# Patient Record
Sex: MALE | Race: WHITE | NOT HISPANIC OR LATINO | Employment: OTHER | URBAN - METROPOLITAN AREA
[De-identification: names, ages, dates, MRNs, and addresses within clinical notes are randomized per-mention and may not be internally consistent; named-entity substitution may affect disease eponyms.]

---

## 2017-01-19 ENCOUNTER — ALLSCRIPTS OFFICE VISIT (OUTPATIENT)
Dept: WOUND CARE | Facility: HOSPITAL | Age: 82
End: 2017-01-19
Payer: COMMERCIAL

## 2017-01-19 PROCEDURE — 11045 DBRDMT SUBQ TISS EACH ADDL: CPT | Performed by: PODIATRIST

## 2017-01-19 PROCEDURE — 99213 OFFICE O/P EST LOW 20 MIN: CPT | Performed by: PODIATRIST

## 2017-01-19 PROCEDURE — 11042 DBRDMT SUBQ TIS 1ST 20SQCM/<: CPT | Performed by: PODIATRIST

## 2017-01-30 ENCOUNTER — HOSPITAL ENCOUNTER (OUTPATIENT)
Dept: NON INVASIVE DIAGNOSTICS | Facility: CLINIC | Age: 82
Discharge: HOME | End: 2017-01-30
Payer: COMMERCIAL

## 2017-01-30 DIAGNOSIS — I73.9 PERIPHERAL VASCULAR DISEASE (HCC): ICD-10-CM

## 2017-01-30 PROCEDURE — 93925 LOWER EXTREMITY STUDY: CPT

## 2017-01-30 PROCEDURE — 93978 VASCULAR STUDY: CPT

## 2017-01-30 PROCEDURE — 93923 UPR/LXTR ART STDY 3+ LVLS: CPT

## 2017-01-31 ENCOUNTER — ALLSCRIPTS OFFICE VISIT (OUTPATIENT)
Dept: WOUND CARE | Facility: HOSPITAL | Age: 82
End: 2017-01-31
Payer: COMMERCIAL

## 2017-01-31 PROCEDURE — 99212 OFFICE O/P EST SF 10 MIN: CPT | Performed by: PODIATRIST

## 2017-05-08 ENCOUNTER — ALLSCRIPTS OFFICE VISIT (OUTPATIENT)
Dept: OTHER | Facility: OTHER | Age: 82
End: 2017-05-08

## 2017-05-29 ENCOUNTER — HOSPITAL ENCOUNTER (INPATIENT)
Facility: HOSPITAL | Age: 82
LOS: 4 days | Discharge: HOME HEALTH | DRG: 191 | End: 2017-06-02
Attending: EMERGENCY MEDICINE | Admitting: INTERNAL MEDICINE
Payer: COMMERCIAL

## 2017-05-29 ENCOUNTER — APPOINTMENT (EMERGENCY)
Dept: RADIOLOGY | Facility: HOSPITAL | Age: 82
DRG: 191 | End: 2017-05-29
Payer: COMMERCIAL

## 2017-05-29 ENCOUNTER — GENERIC CONVERSION - ENCOUNTER (OUTPATIENT)
Dept: OTHER | Facility: OTHER | Age: 82
End: 2017-05-29

## 2017-05-29 DIAGNOSIS — E11.9 DM2 (DIABETES MELLITUS, TYPE 2) (HCC): Chronic | ICD-10-CM

## 2017-05-29 DIAGNOSIS — R06.02 SOB (SHORTNESS OF BREATH): ICD-10-CM

## 2017-05-29 DIAGNOSIS — R05.9 COUGH: Primary | ICD-10-CM

## 2017-05-29 DIAGNOSIS — J44.9 COPD (CHRONIC OBSTRUCTIVE PULMONARY DISEASE) (HCC): Chronic | ICD-10-CM

## 2017-05-29 DIAGNOSIS — I50.9 CHF EXACERBATION (HCC): ICD-10-CM

## 2017-05-29 DIAGNOSIS — Z86.718 HISTORY OF DVT (DEEP VEIN THROMBOSIS): ICD-10-CM

## 2017-05-29 DIAGNOSIS — I50.22 CHRONIC SYSTOLIC HEART FAILURE (HCC): Chronic | ICD-10-CM

## 2017-05-29 DIAGNOSIS — R60.0 LOWER EXTREMITY EDEMA: ICD-10-CM

## 2017-05-29 LAB
ALBUMIN SERPL BCP-MCNC: 4 G/DL (ref 3.5–5)
ALP SERPL-CCNC: 102 U/L (ref 46–116)
ALT SERPL W P-5'-P-CCNC: 14 U/L (ref 12–78)
ANION GAP SERPL CALCULATED.3IONS-SCNC: 8 MMOL/L (ref 4–13)
APTT PPP: 61 SECONDS (ref 23–35)
AST SERPL W P-5'-P-CCNC: 12 U/L (ref 5–45)
BASOPHILS # BLD AUTO: 0.01 THOUSANDS/ΜL (ref 0–0.1)
BASOPHILS NFR BLD AUTO: 0 % (ref 0–1)
BILIRUB SERPL-MCNC: 0.62 MG/DL (ref 0.2–1)
BUN SERPL-MCNC: 27 MG/DL (ref 5–25)
CALCIUM SERPL-MCNC: 9.5 MG/DL (ref 8.3–10.1)
CHLORIDE SERPL-SCNC: 99 MMOL/L (ref 100–108)
CO2 SERPL-SCNC: 27 MMOL/L (ref 21–32)
CREAT SERPL-MCNC: 2.12 MG/DL (ref 0.6–1.3)
EOSINOPHIL # BLD AUTO: 0.05 THOUSAND/ΜL (ref 0–0.61)
EOSINOPHIL NFR BLD AUTO: 1 % (ref 0–6)
ERYTHROCYTE [DISTWIDTH] IN BLOOD BY AUTOMATED COUNT: 14 % (ref 11.6–15.1)
EST. AVERAGE GLUCOSE BLD GHB EST-MCNC: 169 MG/DL
GFR SERPL CREATININE-BSD FRML MDRD: 29.6 ML/MIN/1.73SQ M
GLUCOSE SERPL-MCNC: 129 MG/DL (ref 65–140)
GLUCOSE SERPL-MCNC: 134 MG/DL (ref 65–140)
HBA1C MFR BLD: 7.5 % (ref 4.2–6.3)
HCT VFR BLD AUTO: 39.4 % (ref 36.5–49.3)
HGB BLD-MCNC: 13.6 G/DL (ref 12–17)
INR PPP: 3.02 (ref 0.86–1.16)
LYMPHOCYTES # BLD AUTO: 0.73 THOUSANDS/ΜL (ref 0.6–4.47)
LYMPHOCYTES NFR BLD AUTO: 9 % (ref 14–44)
MCH RBC QN AUTO: 31.2 PG (ref 26.8–34.3)
MCHC RBC AUTO-ENTMCNC: 34.5 G/DL (ref 31.4–37.4)
MCV RBC AUTO: 90 FL (ref 82–98)
MONOCYTES # BLD AUTO: 0.77 THOUSAND/ΜL (ref 0.17–1.22)
MONOCYTES NFR BLD AUTO: 9 % (ref 4–12)
NEUTROPHILS # BLD AUTO: 6.64 THOUSANDS/ΜL (ref 1.85–7.62)
NEUTS SEG NFR BLD AUTO: 81 % (ref 43–75)
NRBC BLD AUTO-RTO: 0 /100 WBCS
NT-PROBNP SERPL-MCNC: 3177 PG/ML
PLATELET # BLD AUTO: 144 THOUSANDS/UL (ref 149–390)
PMV BLD AUTO: 10.5 FL (ref 8.9–12.7)
POTASSIUM SERPL-SCNC: 4.3 MMOL/L (ref 3.5–5.3)
PROT SERPL-MCNC: 8.8 G/DL (ref 6.4–8.2)
PROTHROMBIN TIME: 31.7 SECONDS (ref 12.1–14.4)
RBC # BLD AUTO: 4.36 MILLION/UL (ref 3.88–5.62)
SODIUM SERPL-SCNC: 134 MMOL/L (ref 136–145)
SPECIMEN SOURCE: NORMAL
TROPONIN I BLD-MCNC: 0.01 NG/ML (ref 0–0.08)
TROPONIN I SERPL-MCNC: <0.02 NG/ML
WBC # BLD AUTO: 8.23 THOUSAND/UL (ref 4.31–10.16)

## 2017-05-29 PROCEDURE — 36415 COLL VENOUS BLD VENIPUNCTURE: CPT | Performed by: EMERGENCY MEDICINE

## 2017-05-29 PROCEDURE — 84484 ASSAY OF TROPONIN QUANT: CPT

## 2017-05-29 PROCEDURE — 93005 ELECTROCARDIOGRAM TRACING: CPT | Performed by: EMERGENCY MEDICINE

## 2017-05-29 PROCEDURE — 80053 COMPREHEN METABOLIC PANEL: CPT | Performed by: EMERGENCY MEDICINE

## 2017-05-29 PROCEDURE — 99285 EMERGENCY DEPT VISIT HI MDM: CPT

## 2017-05-29 PROCEDURE — 85730 THROMBOPLASTIN TIME PARTIAL: CPT | Performed by: EMERGENCY MEDICINE

## 2017-05-29 PROCEDURE — 84484 ASSAY OF TROPONIN QUANT: CPT | Performed by: PHYSICIAN ASSISTANT

## 2017-05-29 PROCEDURE — 83036 HEMOGLOBIN GLYCOSYLATED A1C: CPT | Performed by: PHYSICIAN ASSISTANT

## 2017-05-29 PROCEDURE — 85025 COMPLETE CBC W/AUTO DIFF WBC: CPT | Performed by: EMERGENCY MEDICINE

## 2017-05-29 PROCEDURE — 94760 N-INVAS EAR/PLS OXIMETRY 1: CPT

## 2017-05-29 PROCEDURE — 83880 ASSAY OF NATRIURETIC PEPTIDE: CPT | Performed by: EMERGENCY MEDICINE

## 2017-05-29 PROCEDURE — 85610 PROTHROMBIN TIME: CPT | Performed by: EMERGENCY MEDICINE

## 2017-05-29 PROCEDURE — 94640 AIRWAY INHALATION TREATMENT: CPT

## 2017-05-29 PROCEDURE — 82948 REAGENT STRIP/BLOOD GLUCOSE: CPT

## 2017-05-29 PROCEDURE — 87081 CULTURE SCREEN ONLY: CPT | Performed by: INTERNAL MEDICINE

## 2017-05-29 PROCEDURE — 71020 HB CHEST X-RAY 2VW FRONTAL&LATL: CPT

## 2017-05-29 RX ORDER — PREDNISONE 20 MG/1
40 TABLET ORAL DAILY
Status: DISCONTINUED | OUTPATIENT
Start: 2017-05-30 | End: 2017-06-02 | Stop reason: HOSPADM

## 2017-05-29 RX ORDER — PANTOPRAZOLE SODIUM 40 MG/1
40 TABLET, DELAYED RELEASE ORAL DAILY
Status: DISCONTINUED | OUTPATIENT
Start: 2017-05-30 | End: 2017-06-02 | Stop reason: HOSPADM

## 2017-05-29 RX ORDER — SODIUM CHLORIDE FOR INHALATION 0.9 %
3 VIAL, NEBULIZER (ML) INHALATION EVERY 6 HOURS PRN
Status: DISCONTINUED | OUTPATIENT
Start: 2017-05-29 | End: 2017-06-02 | Stop reason: HOSPADM

## 2017-05-29 RX ORDER — FUROSEMIDE 40 MG/1
40 TABLET ORAL 2 TIMES DAILY
Status: DISCONTINUED | OUTPATIENT
Start: 2017-05-29 | End: 2017-06-02 | Stop reason: HOSPADM

## 2017-05-29 RX ORDER — ASPIRIN 81 MG/1
81 TABLET, CHEWABLE ORAL
Status: DISCONTINUED | OUTPATIENT
Start: 2017-05-31 | End: 2017-06-02 | Stop reason: HOSPADM

## 2017-05-29 RX ORDER — INSULIN ASPART 100 [IU]/ML
14 INJECTION, SUSPENSION SUBCUTANEOUS
Status: DISCONTINUED | OUTPATIENT
Start: 2017-05-30 | End: 2017-06-02 | Stop reason: HOSPADM

## 2017-05-29 RX ORDER — PROPRANOLOL HYDROCHLORIDE 20 MG/1
20 TABLET ORAL 2 TIMES DAILY
COMMUNITY
End: 2017-07-08 | Stop reason: HOSPADM

## 2017-05-29 RX ORDER — PROPRANOLOL HYDROCHLORIDE 20 MG/1
20 TABLET ORAL 2 TIMES DAILY
Status: DISCONTINUED | OUTPATIENT
Start: 2017-05-29 | End: 2017-06-02 | Stop reason: HOSPADM

## 2017-05-29 RX ORDER — SOTALOL HYDROCHLORIDE 80 MG/1
80 TABLET ORAL 2 TIMES DAILY
Status: DISCONTINUED | OUTPATIENT
Start: 2017-05-29 | End: 2017-05-29

## 2017-05-29 RX ORDER — HYDROCODONE BITARTRATE AND ACETAMINOPHEN 5; 325 MG/1; MG/1
1 TABLET ORAL EVERY 6 HOURS PRN
Status: DISCONTINUED | OUTPATIENT
Start: 2017-05-29 | End: 2017-06-02 | Stop reason: HOSPADM

## 2017-05-29 RX ORDER — IPRATROPIUM BROMIDE AND ALBUTEROL SULFATE 2.5; .5 MG/3ML; MG/3ML
3 SOLUTION RESPIRATORY (INHALATION)
Status: DISCONTINUED | OUTPATIENT
Start: 2017-05-29 | End: 2017-05-29

## 2017-05-29 RX ORDER — LEVALBUTEROL 1.25 MG/.5ML
1.25 SOLUTION, CONCENTRATE RESPIRATORY (INHALATION) EVERY 6 HOURS PRN
Status: DISCONTINUED | OUTPATIENT
Start: 2017-05-29 | End: 2017-06-02 | Stop reason: HOSPADM

## 2017-05-29 RX ORDER — ONDANSETRON 2 MG/ML
4 INJECTION INTRAMUSCULAR; INTRAVENOUS EVERY 6 HOURS PRN
Status: DISCONTINUED | OUTPATIENT
Start: 2017-05-29 | End: 2017-06-02 | Stop reason: HOSPADM

## 2017-05-29 RX ORDER — WARFARIN SODIUM 2.5 MG/1
2.5 TABLET ORAL SEE ADMIN INSTRUCTIONS
COMMUNITY

## 2017-05-29 RX ORDER — FLUTICASONE FUROATE AND VILANTEROL 100; 25 UG/1; UG/1
1 POWDER RESPIRATORY (INHALATION) DAILY
COMMUNITY

## 2017-05-29 RX ORDER — INSULIN ASPART 100 [IU]/ML
6 INJECTION, SUSPENSION SUBCUTANEOUS
Status: DISCONTINUED | OUTPATIENT
Start: 2017-05-29 | End: 2017-06-02 | Stop reason: HOSPADM

## 2017-05-29 RX ORDER — ZOLPIDEM TARTRATE 5 MG/1
5 TABLET ORAL
COMMUNITY

## 2017-05-29 RX ORDER — INSULIN ASPART 100 [IU]/ML
6 INJECTION, SUSPENSION SUBCUTANEOUS
COMMUNITY

## 2017-05-29 RX ORDER — FERROUS SULFATE 325(65) MG
325 TABLET ORAL 2 TIMES DAILY
COMMUNITY

## 2017-05-29 RX ORDER — LEVALBUTEROL 1.25 MG/.5ML
1.25 SOLUTION, CONCENTRATE RESPIRATORY (INHALATION)
Status: DISCONTINUED | OUTPATIENT
Start: 2017-05-29 | End: 2017-06-02 | Stop reason: HOSPADM

## 2017-05-29 RX ORDER — INSULIN ASPART 100 [IU]/ML
14 INJECTION, SUSPENSION SUBCUTANEOUS
Status: ON HOLD | COMMUNITY
End: 2017-07-08

## 2017-05-29 RX ORDER — FERROUS SULFATE 325(65) MG
325 TABLET ORAL 2 TIMES DAILY
Status: DISCONTINUED | OUTPATIENT
Start: 2017-05-29 | End: 2017-06-02 | Stop reason: HOSPADM

## 2017-05-29 RX ORDER — MAGNESIUM HYDROXIDE/ALUMINUM HYDROXICE/SIMETHICONE 120; 1200; 1200 MG/30ML; MG/30ML; MG/30ML
30 SUSPENSION ORAL EVERY 6 HOURS PRN
Status: DISCONTINUED | OUTPATIENT
Start: 2017-05-29 | End: 2017-06-02 | Stop reason: HOSPADM

## 2017-05-29 RX ORDER — ZOLPIDEM TARTRATE 5 MG/1
5 TABLET ORAL
Status: DISCONTINUED | OUTPATIENT
Start: 2017-05-29 | End: 2017-06-02 | Stop reason: HOSPADM

## 2017-05-29 RX ADMIN — FUROSEMIDE 40 MG: 40 TABLET ORAL at 19:57

## 2017-05-29 RX ADMIN — Medication 325 MG: at 19:57

## 2017-05-29 RX ADMIN — LEVALBUTEROL HYDROCHLORIDE 1.25 MG: 1.25 SOLUTION, CONCENTRATE RESPIRATORY (INHALATION) at 20:01

## 2017-05-29 RX ADMIN — IPRATROPIUM BROMIDE 0.5 MG: 0.5 SOLUTION RESPIRATORY (INHALATION) at 20:01

## 2017-05-29 RX ADMIN — PROPRANOLOL HYDROCHLORIDE 20 MG: 20 TABLET ORAL at 21:38

## 2017-05-29 RX ADMIN — FLUTICASONE PROPIONATE AND SALMETEROL 1 PUFF: 50; 100 POWDER RESPIRATORY (INHALATION) at 21:38

## 2017-05-30 LAB
ANION GAP SERPL CALCULATED.3IONS-SCNC: 8 MMOL/L (ref 4–13)
ATRIAL RATE: 60 BPM
BUN SERPL-MCNC: 29 MG/DL (ref 5–25)
CALCIUM SERPL-MCNC: 9.2 MG/DL (ref 8.3–10.1)
CHLORIDE SERPL-SCNC: 100 MMOL/L (ref 100–108)
CO2 SERPL-SCNC: 27 MMOL/L (ref 21–32)
CREAT SERPL-MCNC: 2.02 MG/DL (ref 0.6–1.3)
ERYTHROCYTE [DISTWIDTH] IN BLOOD BY AUTOMATED COUNT: 14.2 % (ref 11.6–15.1)
GFR SERPL CREATININE-BSD FRML MDRD: 31.3 ML/MIN/1.73SQ M
GLUCOSE SERPL-MCNC: 112 MG/DL (ref 65–140)
GLUCOSE SERPL-MCNC: 130 MG/DL (ref 65–140)
GLUCOSE SERPL-MCNC: 132 MG/DL (ref 65–140)
GLUCOSE SERPL-MCNC: 265 MG/DL (ref 65–140)
GLUCOSE SERPL-MCNC: 285 MG/DL (ref 65–140)
HCT VFR BLD AUTO: 34.9 % (ref 36.5–49.3)
HGB BLD-MCNC: 11.8 G/DL (ref 12–17)
INR PPP: 3.17 (ref 0.86–1.16)
MCH RBC QN AUTO: 30.8 PG (ref 26.8–34.3)
MCHC RBC AUTO-ENTMCNC: 33.8 G/DL (ref 31.4–37.4)
MCV RBC AUTO: 91 FL (ref 82–98)
P AXIS: 104 DEGREES
PLATELET # BLD AUTO: 107 THOUSANDS/UL (ref 149–390)
PMV BLD AUTO: 10.3 FL (ref 8.9–12.7)
POTASSIUM SERPL-SCNC: 4 MMOL/L (ref 3.5–5.3)
PR INTERVAL: 262 MS
PROTHROMBIN TIME: 33 SECONDS (ref 12.1–14.4)
QRS AXIS: 56 DEGREES
QRSD INTERVAL: 80 MS
QT INTERVAL: 470 MS
QTC INTERVAL: 470 MS
RBC # BLD AUTO: 3.83 MILLION/UL (ref 3.88–5.62)
SODIUM SERPL-SCNC: 135 MMOL/L (ref 136–145)
T WAVE AXIS: 55 DEGREES
TROPONIN I SERPL-MCNC: <0.02 NG/ML
VENTRICULAR RATE: 60 BPM
WBC # BLD AUTO: 5.86 THOUSAND/UL (ref 4.31–10.16)

## 2017-05-30 PROCEDURE — 94640 AIRWAY INHALATION TREATMENT: CPT | Performed by: SOCIAL WORKER

## 2017-05-30 PROCEDURE — 84484 ASSAY OF TROPONIN QUANT: CPT | Performed by: PHYSICIAN ASSISTANT

## 2017-05-30 PROCEDURE — 94760 N-INVAS EAR/PLS OXIMETRY 1: CPT

## 2017-05-30 PROCEDURE — 80048 BASIC METABOLIC PNL TOTAL CA: CPT | Performed by: INTERNAL MEDICINE

## 2017-05-30 PROCEDURE — 82948 REAGENT STRIP/BLOOD GLUCOSE: CPT

## 2017-05-30 PROCEDURE — 87070 CULTURE OTHR SPECIMN AEROBIC: CPT | Performed by: INTERNAL MEDICINE

## 2017-05-30 PROCEDURE — 87077 CULTURE AEROBIC IDENTIFY: CPT | Performed by: INTERNAL MEDICINE

## 2017-05-30 PROCEDURE — 87186 SC STD MICRODIL/AGAR DIL: CPT | Performed by: INTERNAL MEDICINE

## 2017-05-30 PROCEDURE — 94760 N-INVAS EAR/PLS OXIMETRY 1: CPT | Performed by: SOCIAL WORKER

## 2017-05-30 PROCEDURE — 87205 SMEAR GRAM STAIN: CPT | Performed by: INTERNAL MEDICINE

## 2017-05-30 PROCEDURE — 94640 AIRWAY INHALATION TREATMENT: CPT

## 2017-05-30 PROCEDURE — 85027 COMPLETE CBC AUTOMATED: CPT | Performed by: PHYSICIAN ASSISTANT

## 2017-05-30 PROCEDURE — 85610 PROTHROMBIN TIME: CPT | Performed by: PHYSICIAN ASSISTANT

## 2017-05-30 RX ORDER — FUROSEMIDE 10 MG/ML
20 INJECTION INTRAMUSCULAR; INTRAVENOUS ONCE
Status: COMPLETED | OUTPATIENT
Start: 2017-05-30 | End: 2017-05-30

## 2017-05-30 RX ORDER — GUAIFENESIN 600 MG
600 TABLET, EXTENDED RELEASE 12 HR ORAL EVERY 12 HOURS SCHEDULED
Status: DISCONTINUED | OUTPATIENT
Start: 2017-05-30 | End: 2017-06-02 | Stop reason: HOSPADM

## 2017-05-30 RX ORDER — SOTALOL HYDROCHLORIDE 80 MG/1
80 TABLET ORAL DAILY
Status: DISCONTINUED | OUTPATIENT
Start: 2017-05-30 | End: 2017-06-02 | Stop reason: HOSPADM

## 2017-05-30 RX ADMIN — INSULIN LISPRO 2 UNITS: 100 INJECTION, SOLUTION INTRAVENOUS; SUBCUTANEOUS at 21:17

## 2017-05-30 RX ADMIN — FLUTICASONE PROPIONATE AND SALMETEROL 1 PUFF: 50; 100 POWDER RESPIRATORY (INHALATION) at 21:15

## 2017-05-30 RX ADMIN — PROPRANOLOL HYDROCHLORIDE 20 MG: 20 TABLET ORAL at 08:37

## 2017-05-30 RX ADMIN — IPRATROPIUM BROMIDE 0.5 MG: 0.5 SOLUTION RESPIRATORY (INHALATION) at 19:31

## 2017-05-30 RX ADMIN — SOTALOL HYDROCHLORIDE 80 MG: 80 TABLET ORAL at 11:40

## 2017-05-30 RX ADMIN — IPRATROPIUM BROMIDE 0.5 MG: 0.5 SOLUTION RESPIRATORY (INHALATION) at 07:19

## 2017-05-30 RX ADMIN — ZOLPIDEM TARTRATE 5 MG: 5 TABLET, FILM COATED ORAL at 21:14

## 2017-05-30 RX ADMIN — HYDROCODONE BITARTRATE AND ACETAMINOPHEN 1 TABLET: 5; 325 TABLET ORAL at 00:39

## 2017-05-30 RX ADMIN — PROPRANOLOL HYDROCHLORIDE 20 MG: 20 TABLET ORAL at 17:30

## 2017-05-30 RX ADMIN — PREDNISONE 40 MG: 20 TABLET ORAL at 08:36

## 2017-05-30 RX ADMIN — BISACODYL 10 MG: 5 TABLET, COATED ORAL at 08:36

## 2017-05-30 RX ADMIN — IPRATROPIUM BROMIDE 0.5 MG: 0.5 SOLUTION RESPIRATORY (INHALATION) at 13:09

## 2017-05-30 RX ADMIN — Medication 325 MG: at 17:30

## 2017-05-30 RX ADMIN — LEVALBUTEROL HYDROCHLORIDE 1.25 MG: 1.25 SOLUTION, CONCENTRATE RESPIRATORY (INHALATION) at 07:19

## 2017-05-30 RX ADMIN — METOPROLOL TARTRATE 5 MG: 5 INJECTION INTRAVENOUS at 07:57

## 2017-05-30 RX ADMIN — INSULIN LISPRO 2 UNITS: 100 INJECTION, SOLUTION INTRAVENOUS; SUBCUTANEOUS at 16:38

## 2017-05-30 RX ADMIN — FUROSEMIDE 40 MG: 40 TABLET ORAL at 08:36

## 2017-05-30 RX ADMIN — INSULIN ASPART 6 UNITS: 100 INJECTION, SUSPENSION SUBCUTANEOUS at 16:37

## 2017-05-30 RX ADMIN — FUROSEMIDE 20 MG: 10 INJECTION, SOLUTION INTRAMUSCULAR; INTRAVENOUS at 23:38

## 2017-05-30 RX ADMIN — LEVALBUTEROL HYDROCHLORIDE 1.25 MG: 1.25 SOLUTION, CONCENTRATE RESPIRATORY (INHALATION) at 19:31

## 2017-05-30 RX ADMIN — Medication 325 MG: at 08:36

## 2017-05-30 RX ADMIN — FLUTICASONE PROPIONATE AND SALMETEROL 1 PUFF: 50; 100 POWDER RESPIRATORY (INHALATION) at 08:37

## 2017-05-30 RX ADMIN — GUAIFENESIN 600 MG: 600 TABLET, EXTENDED RELEASE ORAL at 11:40

## 2017-05-30 RX ADMIN — GUAIFENESIN 600 MG: 600 TABLET, EXTENDED RELEASE ORAL at 21:14

## 2017-05-30 RX ADMIN — PANTOPRAZOLE SODIUM 40 MG: 40 TABLET, DELAYED RELEASE ORAL at 08:36

## 2017-05-30 RX ADMIN — INSULIN ASPART 14 UNITS: 100 INJECTION, SUSPENSION SUBCUTANEOUS at 08:36

## 2017-05-30 RX ADMIN — LEVALBUTEROL HYDROCHLORIDE 1.25 MG: 1.25 SOLUTION, CONCENTRATE RESPIRATORY (INHALATION) at 13:09

## 2017-05-30 RX ADMIN — FUROSEMIDE 40 MG: 40 TABLET ORAL at 17:30

## 2017-05-31 ENCOUNTER — APPOINTMENT (INPATIENT)
Dept: NON INVASIVE DIAGNOSTICS | Facility: HOSPITAL | Age: 82
DRG: 191 | End: 2017-05-31
Payer: COMMERCIAL

## 2017-05-31 ENCOUNTER — GENERIC CONVERSION - ENCOUNTER (OUTPATIENT)
Dept: OTHER | Facility: OTHER | Age: 82
End: 2017-05-31

## 2017-05-31 LAB
ANION GAP SERPL CALCULATED.3IONS-SCNC: 9 MMOL/L (ref 4–13)
BASOPHILS # BLD AUTO: 0 THOUSANDS/ΜL (ref 0–0.1)
BASOPHILS NFR BLD AUTO: 0 % (ref 0–1)
BUN SERPL-MCNC: 39 MG/DL (ref 5–25)
CALCIUM SERPL-MCNC: 9.1 MG/DL (ref 8.3–10.1)
CHLORIDE SERPL-SCNC: 101 MMOL/L (ref 100–108)
CO2 SERPL-SCNC: 27 MMOL/L (ref 21–32)
CREAT SERPL-MCNC: 2.14 MG/DL (ref 0.6–1.3)
EOSINOPHIL # BLD AUTO: 0 THOUSAND/ΜL (ref 0–0.61)
EOSINOPHIL NFR BLD AUTO: 0 % (ref 0–6)
ERYTHROCYTE [DISTWIDTH] IN BLOOD BY AUTOMATED COUNT: 14.1 % (ref 11.6–15.1)
GFR SERPL CREATININE-BSD FRML MDRD: 29.3 ML/MIN/1.73SQ M
GLUCOSE SERPL-MCNC: 104 MG/DL (ref 65–140)
GLUCOSE SERPL-MCNC: 212 MG/DL (ref 65–140)
GLUCOSE SERPL-MCNC: 217 MG/DL (ref 65–140)
GLUCOSE SERPL-MCNC: 266 MG/DL (ref 65–140)
GLUCOSE SERPL-MCNC: 369 MG/DL (ref 65–140)
HCT VFR BLD AUTO: 35.1 % (ref 36.5–49.3)
HGB BLD-MCNC: 12 G/DL (ref 12–17)
INR PPP: 2.58 (ref 0.86–1.16)
LYMPHOCYTES # BLD AUTO: 0.41 THOUSANDS/ΜL (ref 0.6–4.47)
LYMPHOCYTES NFR BLD AUTO: 6 % (ref 14–44)
MCH RBC QN AUTO: 31.1 PG (ref 26.8–34.3)
MCHC RBC AUTO-ENTMCNC: 34.2 G/DL (ref 31.4–37.4)
MCV RBC AUTO: 91 FL (ref 82–98)
MONOCYTES # BLD AUTO: 0.63 THOUSAND/ΜL (ref 0.17–1.22)
MONOCYTES NFR BLD AUTO: 10 % (ref 4–12)
MRSA NOSE QL CULT: NORMAL
NEUTROPHILS # BLD AUTO: 5.44 THOUSANDS/ΜL (ref 1.85–7.62)
NEUTS SEG NFR BLD AUTO: 84 % (ref 43–75)
NRBC BLD AUTO-RTO: 0 /100 WBCS
PLATELET # BLD AUTO: 113 THOUSANDS/UL (ref 149–390)
PMV BLD AUTO: 10.8 FL (ref 8.9–12.7)
POTASSIUM SERPL-SCNC: 4.2 MMOL/L (ref 3.5–5.3)
PROTHROMBIN TIME: 28 SECONDS (ref 12.1–14.4)
RBC # BLD AUTO: 3.86 MILLION/UL (ref 3.88–5.62)
SODIUM SERPL-SCNC: 137 MMOL/L (ref 136–145)
WBC # BLD AUTO: 6.5 THOUSAND/UL (ref 4.31–10.16)

## 2017-05-31 PROCEDURE — 94640 AIRWAY INHALATION TREATMENT: CPT

## 2017-05-31 PROCEDURE — 85025 COMPLETE CBC W/AUTO DIFF WBC: CPT | Performed by: INTERNAL MEDICINE

## 2017-05-31 PROCEDURE — 82948 REAGENT STRIP/BLOOD GLUCOSE: CPT

## 2017-05-31 PROCEDURE — 94640 AIRWAY INHALATION TREATMENT: CPT | Performed by: SOCIAL WORKER

## 2017-05-31 PROCEDURE — 94760 N-INVAS EAR/PLS OXIMETRY 1: CPT

## 2017-05-31 PROCEDURE — 80048 BASIC METABOLIC PNL TOTAL CA: CPT | Performed by: INTERNAL MEDICINE

## 2017-05-31 PROCEDURE — 94668 MNPJ CHEST WALL SBSQ: CPT

## 2017-05-31 PROCEDURE — 93306 TTE W/DOPPLER COMPLETE: CPT

## 2017-05-31 PROCEDURE — 94760 N-INVAS EAR/PLS OXIMETRY 1: CPT | Performed by: SOCIAL WORKER

## 2017-05-31 PROCEDURE — 94668 MNPJ CHEST WALL SBSQ: CPT | Performed by: SOCIAL WORKER

## 2017-05-31 PROCEDURE — 85610 PROTHROMBIN TIME: CPT | Performed by: INTERNAL MEDICINE

## 2017-05-31 RX ORDER — LEVOFLOXACIN 750 MG/1
750 TABLET ORAL EVERY OTHER DAY
Status: DISCONTINUED | OUTPATIENT
Start: 2017-05-31 | End: 2017-05-31

## 2017-05-31 RX ORDER — WARFARIN SODIUM 2.5 MG/1
2.5 TABLET ORAL
Status: DISCONTINUED | OUTPATIENT
Start: 2017-05-31 | End: 2017-06-02 | Stop reason: HOSPADM

## 2017-05-31 RX ADMIN — SOTALOL HYDROCHLORIDE 80 MG: 80 TABLET ORAL at 08:20

## 2017-05-31 RX ADMIN — Medication 325 MG: at 08:19

## 2017-05-31 RX ADMIN — GUAIFENESIN 600 MG: 600 TABLET, EXTENDED RELEASE ORAL at 21:30

## 2017-05-31 RX ADMIN — ASPIRIN 81 MG: 81 TABLET, CHEWABLE ORAL at 08:20

## 2017-05-31 RX ADMIN — FLUTICASONE PROPIONATE AND SALMETEROL 1 PUFF: 50; 100 POWDER RESPIRATORY (INHALATION) at 21:29

## 2017-05-31 RX ADMIN — WARFARIN SODIUM 2.5 MG: 2.5 TABLET ORAL at 17:27

## 2017-05-31 RX ADMIN — FLUTICASONE PROPIONATE AND SALMETEROL 1 PUFF: 50; 100 POWDER RESPIRATORY (INHALATION) at 08:21

## 2017-05-31 RX ADMIN — LEVALBUTEROL HYDROCHLORIDE 1.25 MG: 1.25 SOLUTION, CONCENTRATE RESPIRATORY (INHALATION) at 19:16

## 2017-05-31 RX ADMIN — FUROSEMIDE 40 MG: 40 TABLET ORAL at 08:16

## 2017-05-31 RX ADMIN — GUAIFENESIN 600 MG: 600 TABLET, EXTENDED RELEASE ORAL at 08:20

## 2017-05-31 RX ADMIN — PROPRANOLOL HYDROCHLORIDE 20 MG: 20 TABLET ORAL at 08:21

## 2017-05-31 RX ADMIN — LEVALBUTEROL HYDROCHLORIDE 1.25 MG: 1.25 SOLUTION, CONCENTRATE RESPIRATORY (INHALATION) at 14:06

## 2017-05-31 RX ADMIN — HYDROCODONE BITARTRATE AND ACETAMINOPHEN 1 TABLET: 5; 325 TABLET ORAL at 21:30

## 2017-05-31 RX ADMIN — INSULIN LISPRO 2 UNITS: 100 INJECTION, SOLUTION INTRAVENOUS; SUBCUTANEOUS at 16:25

## 2017-05-31 RX ADMIN — IPRATROPIUM BROMIDE 0.5 MG: 0.5 SOLUTION RESPIRATORY (INHALATION) at 19:16

## 2017-05-31 RX ADMIN — PANTOPRAZOLE SODIUM 40 MG: 40 TABLET, DELAYED RELEASE ORAL at 08:19

## 2017-05-31 RX ADMIN — PREDNISONE 40 MG: 20 TABLET ORAL at 08:20

## 2017-05-31 RX ADMIN — FUROSEMIDE 40 MG: 40 TABLET ORAL at 17:27

## 2017-05-31 RX ADMIN — INSULIN ASPART 14 UNITS: 100 INJECTION, SUSPENSION SUBCUTANEOUS at 08:09

## 2017-05-31 RX ADMIN — ZOLPIDEM TARTRATE 5 MG: 5 TABLET, FILM COATED ORAL at 22:52

## 2017-05-31 RX ADMIN — INSULIN LISPRO 1 UNITS: 100 INJECTION, SOLUTION INTRAVENOUS; SUBCUTANEOUS at 06:15

## 2017-05-31 RX ADMIN — LEVALBUTEROL HYDROCHLORIDE 1.25 MG: 1.25 SOLUTION, CONCENTRATE RESPIRATORY (INHALATION) at 07:18

## 2017-05-31 RX ADMIN — INSULIN ASPART 6 UNITS: 100 INJECTION, SUSPENSION SUBCUTANEOUS at 16:25

## 2017-05-31 RX ADMIN — INSULIN LISPRO 3 UNITS: 100 INJECTION, SOLUTION INTRAVENOUS; SUBCUTANEOUS at 21:30

## 2017-05-31 RX ADMIN — PROPRANOLOL HYDROCHLORIDE 20 MG: 20 TABLET ORAL at 17:27

## 2017-05-31 RX ADMIN — IPRATROPIUM BROMIDE 0.5 MG: 0.5 SOLUTION RESPIRATORY (INHALATION) at 14:06

## 2017-05-31 RX ADMIN — Medication 325 MG: at 17:27

## 2017-05-31 RX ADMIN — IPRATROPIUM BROMIDE 0.5 MG: 0.5 SOLUTION RESPIRATORY (INHALATION) at 07:18

## 2017-06-01 LAB
ANION GAP SERPL CALCULATED.3IONS-SCNC: 9 MMOL/L (ref 4–13)
BACTERIA SPT RESP CULT: NORMAL
BACTERIA SPT RESP CULT: NORMAL
BUN SERPL-MCNC: 48 MG/DL (ref 5–25)
CALCIUM SERPL-MCNC: 9.2 MG/DL (ref 8.3–10.1)
CHLORIDE SERPL-SCNC: 103 MMOL/L (ref 100–108)
CO2 SERPL-SCNC: 27 MMOL/L (ref 21–32)
CREAT SERPL-MCNC: 2.08 MG/DL (ref 0.6–1.3)
GFR SERPL CREATININE-BSD FRML MDRD: 30.3 ML/MIN/1.73SQ M
GLUCOSE SERPL-MCNC: 170 MG/DL (ref 65–140)
GLUCOSE SERPL-MCNC: 201 MG/DL (ref 65–140)
GLUCOSE SERPL-MCNC: 204 MG/DL (ref 65–140)
GLUCOSE SERPL-MCNC: 240 MG/DL (ref 65–140)
GLUCOSE SERPL-MCNC: 358 MG/DL (ref 65–140)
GRAM STN SPEC: NORMAL
INR PPP: 2.05 (ref 0.86–1.16)
POTASSIUM SERPL-SCNC: 3.8 MMOL/L (ref 3.5–5.3)
PROTHROMBIN TIME: 23.3 SECONDS (ref 12.1–14.4)
SODIUM SERPL-SCNC: 139 MMOL/L (ref 136–145)

## 2017-06-01 PROCEDURE — 94760 N-INVAS EAR/PLS OXIMETRY 1: CPT

## 2017-06-01 PROCEDURE — 82948 REAGENT STRIP/BLOOD GLUCOSE: CPT

## 2017-06-01 PROCEDURE — 94668 MNPJ CHEST WALL SBSQ: CPT

## 2017-06-01 PROCEDURE — 94640 AIRWAY INHALATION TREATMENT: CPT

## 2017-06-01 PROCEDURE — 80048 BASIC METABOLIC PNL TOTAL CA: CPT | Performed by: INTERNAL MEDICINE

## 2017-06-01 PROCEDURE — 85610 PROTHROMBIN TIME: CPT | Performed by: INTERNAL MEDICINE

## 2017-06-01 RX ADMIN — IPRATROPIUM BROMIDE 0.5 MG: 0.5 SOLUTION RESPIRATORY (INHALATION) at 07:50

## 2017-06-01 RX ADMIN — FLUTICASONE PROPIONATE AND SALMETEROL 1 PUFF: 50; 100 POWDER RESPIRATORY (INHALATION) at 08:37

## 2017-06-01 RX ADMIN — FUROSEMIDE 40 MG: 40 TABLET ORAL at 08:36

## 2017-06-01 RX ADMIN — FLUTICASONE PROPIONATE AND SALMETEROL 1 PUFF: 50; 100 POWDER RESPIRATORY (INHALATION) at 21:01

## 2017-06-01 RX ADMIN — INSULIN LISPRO 3 UNITS: 100 INJECTION, SOLUTION INTRAVENOUS; SUBCUTANEOUS at 21:00

## 2017-06-01 RX ADMIN — FUROSEMIDE 40 MG: 40 TABLET ORAL at 17:15

## 2017-06-01 RX ADMIN — LEVALBUTEROL HYDROCHLORIDE 1.25 MG: 1.25 SOLUTION, CONCENTRATE RESPIRATORY (INHALATION) at 13:43

## 2017-06-01 RX ADMIN — PREDNISONE 40 MG: 20 TABLET ORAL at 08:35

## 2017-06-01 RX ADMIN — Medication 325 MG: at 08:36

## 2017-06-01 RX ADMIN — INSULIN ASPART 14 UNITS: 100 INJECTION, SUSPENSION SUBCUTANEOUS at 08:03

## 2017-06-01 RX ADMIN — INSULIN LISPRO 1 UNITS: 100 INJECTION, SOLUTION INTRAVENOUS; SUBCUTANEOUS at 06:52

## 2017-06-01 RX ADMIN — ZOLPIDEM TARTRATE 5 MG: 5 TABLET, FILM COATED ORAL at 21:01

## 2017-06-01 RX ADMIN — IPRATROPIUM BROMIDE 0.5 MG: 0.5 SOLUTION RESPIRATORY (INHALATION) at 19:17

## 2017-06-01 RX ADMIN — INSULIN LISPRO 2 UNITS: 100 INJECTION, SOLUTION INTRAVENOUS; SUBCUTANEOUS at 17:14

## 2017-06-01 RX ADMIN — PANTOPRAZOLE SODIUM 40 MG: 40 TABLET, DELAYED RELEASE ORAL at 08:36

## 2017-06-01 RX ADMIN — GUAIFENESIN 600 MG: 600 TABLET, EXTENDED RELEASE ORAL at 21:01

## 2017-06-01 RX ADMIN — CEFEPIME HYDROCHLORIDE 2000 MG: 2 INJECTION, POWDER, FOR SOLUTION INTRAVENOUS at 13:30

## 2017-06-01 RX ADMIN — WARFARIN SODIUM 2.5 MG: 2.5 TABLET ORAL at 17:15

## 2017-06-01 RX ADMIN — Medication 325 MG: at 17:15

## 2017-06-01 RX ADMIN — LEVALBUTEROL HYDROCHLORIDE 1.25 MG: 1.25 SOLUTION, CONCENTRATE RESPIRATORY (INHALATION) at 19:17

## 2017-06-01 RX ADMIN — INSULIN ASPART 6 UNITS: 100 INJECTION, SUSPENSION SUBCUTANEOUS at 17:15

## 2017-06-01 RX ADMIN — INSULIN LISPRO 1 UNITS: 100 INJECTION, SOLUTION INTRAVENOUS; SUBCUTANEOUS at 12:30

## 2017-06-01 RX ADMIN — SOTALOL HYDROCHLORIDE 80 MG: 80 TABLET ORAL at 08:36

## 2017-06-01 RX ADMIN — HYDROCODONE BITARTRATE AND ACETAMINOPHEN 1 TABLET: 5; 325 TABLET ORAL at 21:00

## 2017-06-01 RX ADMIN — PROPRANOLOL HYDROCHLORIDE 20 MG: 20 TABLET ORAL at 08:37

## 2017-06-01 RX ADMIN — PROPRANOLOL HYDROCHLORIDE 20 MG: 20 TABLET ORAL at 17:15

## 2017-06-01 RX ADMIN — HYDROCODONE BITARTRATE AND ACETAMINOPHEN 1 TABLET: 5; 325 TABLET ORAL at 08:35

## 2017-06-01 RX ADMIN — LEVALBUTEROL HYDROCHLORIDE 1.25 MG: 1.25 SOLUTION, CONCENTRATE RESPIRATORY (INHALATION) at 07:50

## 2017-06-01 RX ADMIN — IPRATROPIUM BROMIDE 0.5 MG: 0.5 SOLUTION RESPIRATORY (INHALATION) at 13:43

## 2017-06-01 RX ADMIN — GUAIFENESIN 600 MG: 600 TABLET, EXTENDED RELEASE ORAL at 08:36

## 2017-06-02 VITALS
DIASTOLIC BLOOD PRESSURE: 65 MMHG | SYSTOLIC BLOOD PRESSURE: 144 MMHG | TEMPERATURE: 98 F | WEIGHT: 185.85 LBS | HEIGHT: 69 IN | RESPIRATION RATE: 18 BRPM | OXYGEN SATURATION: 91 % | BODY MASS INDEX: 27.53 KG/M2 | HEART RATE: 64 BPM

## 2017-06-02 PROBLEM — J20.9 ACUTE TRACHEOBRONCHITIS: Status: ACTIVE | Noted: 2017-06-02

## 2017-06-02 PROBLEM — I50.32 CHRONIC DIASTOLIC CONGESTIVE HEART FAILURE (HCC): Status: ACTIVE | Noted: 2017-05-29

## 2017-06-02 PROBLEM — J21.9 ACUTE BRONCHIOLITIS: Status: ACTIVE | Noted: 2017-06-02

## 2017-06-02 LAB
ANION GAP SERPL CALCULATED.3IONS-SCNC: 9 MMOL/L (ref 4–13)
BASOPHILS # BLD AUTO: 0.01 THOUSANDS/ΜL (ref 0–0.1)
BASOPHILS NFR BLD AUTO: 0 % (ref 0–1)
BUN SERPL-MCNC: 54 MG/DL (ref 5–25)
CALCIUM SERPL-MCNC: 9.5 MG/DL (ref 8.3–10.1)
CHLORIDE SERPL-SCNC: 104 MMOL/L (ref 100–108)
CO2 SERPL-SCNC: 28 MMOL/L (ref 21–32)
CREAT SERPL-MCNC: 2 MG/DL (ref 0.6–1.3)
EOSINOPHIL # BLD AUTO: 0 THOUSAND/ΜL (ref 0–0.61)
EOSINOPHIL NFR BLD AUTO: 0 % (ref 0–6)
ERYTHROCYTE [DISTWIDTH] IN BLOOD BY AUTOMATED COUNT: 14 % (ref 11.6–15.1)
GFR SERPL CREATININE-BSD FRML MDRD: 31.7 ML/MIN/1.73SQ M
GLUCOSE SERPL-MCNC: 180 MG/DL (ref 65–140)
GLUCOSE SERPL-MCNC: 193 MG/DL (ref 65–140)
GLUCOSE SERPL-MCNC: 199 MG/DL (ref 65–140)
GLUCOSE SERPL-MCNC: 206 MG/DL (ref 65–140)
HCT VFR BLD AUTO: 37.4 % (ref 36.5–49.3)
HGB BLD-MCNC: 12.5 G/DL (ref 12–17)
INR PPP: 2.24 (ref 0.86–1.16)
LYMPHOCYTES # BLD AUTO: 0.63 THOUSANDS/ΜL (ref 0.6–4.47)
LYMPHOCYTES NFR BLD AUTO: 7 % (ref 14–44)
MCH RBC QN AUTO: 30.8 PG (ref 26.8–34.3)
MCHC RBC AUTO-ENTMCNC: 33.4 G/DL (ref 31.4–37.4)
MCV RBC AUTO: 92 FL (ref 82–98)
MONOCYTES # BLD AUTO: 0.71 THOUSAND/ΜL (ref 0.17–1.22)
MONOCYTES NFR BLD AUTO: 8 % (ref 4–12)
NEUTROPHILS # BLD AUTO: 7.23 THOUSANDS/ΜL (ref 1.85–7.62)
NEUTS SEG NFR BLD AUTO: 85 % (ref 43–75)
NRBC BLD AUTO-RTO: 0 /100 WBCS
PLATELET # BLD AUTO: 141 THOUSANDS/UL (ref 149–390)
PMV BLD AUTO: 10.9 FL (ref 8.9–12.7)
POTASSIUM SERPL-SCNC: 3.8 MMOL/L (ref 3.5–5.3)
PROTHROMBIN TIME: 25 SECONDS (ref 12.1–14.4)
RBC # BLD AUTO: 4.06 MILLION/UL (ref 3.88–5.62)
SODIUM SERPL-SCNC: 141 MMOL/L (ref 136–145)
WBC # BLD AUTO: 8.62 THOUSAND/UL (ref 4.31–10.16)

## 2017-06-02 PROCEDURE — 85025 COMPLETE CBC W/AUTO DIFF WBC: CPT | Performed by: INTERNAL MEDICINE

## 2017-06-02 PROCEDURE — 94668 MNPJ CHEST WALL SBSQ: CPT

## 2017-06-02 PROCEDURE — 85610 PROTHROMBIN TIME: CPT | Performed by: INTERNAL MEDICINE

## 2017-06-02 PROCEDURE — 80048 BASIC METABOLIC PNL TOTAL CA: CPT | Performed by: INTERNAL MEDICINE

## 2017-06-02 PROCEDURE — 94760 N-INVAS EAR/PLS OXIMETRY 1: CPT

## 2017-06-02 PROCEDURE — 82948 REAGENT STRIP/BLOOD GLUCOSE: CPT

## 2017-06-02 PROCEDURE — 94640 AIRWAY INHALATION TREATMENT: CPT

## 2017-06-02 RX ORDER — GUAIFENESIN 600 MG
600 TABLET, EXTENDED RELEASE 12 HR ORAL EVERY 12 HOURS SCHEDULED
Qty: 20 TABLET | Refills: 0 | Status: SHIPPED | OUTPATIENT
Start: 2017-06-02

## 2017-06-02 RX ORDER — PREDNISONE 20 MG/1
10 TABLET ORAL DAILY
Qty: 5 TABLET | Refills: 0 | Status: SHIPPED | OUTPATIENT
Start: 2017-06-02 | End: 2017-06-07

## 2017-06-02 RX ORDER — CIPROFLOXACIN 250 MG/1
250 TABLET, FILM COATED ORAL 2 TIMES DAILY
Qty: 14 TABLET | Refills: 0 | Status: SHIPPED | OUTPATIENT
Start: 2017-06-02 | End: 2017-06-09

## 2017-06-02 RX ORDER — SOTALOL HYDROCHLORIDE 80 MG/1
80 TABLET ORAL DAILY
Qty: 30 TABLET | Refills: 0
Start: 2017-06-02

## 2017-06-02 RX ADMIN — PROPRANOLOL HYDROCHLORIDE 20 MG: 20 TABLET ORAL at 09:41

## 2017-06-02 RX ADMIN — CEFEPIME HYDROCHLORIDE 2000 MG: 2 INJECTION, POWDER, FOR SOLUTION INTRAVENOUS at 12:34

## 2017-06-02 RX ADMIN — IPRATROPIUM BROMIDE 0.5 MG: 0.5 SOLUTION RESPIRATORY (INHALATION) at 07:52

## 2017-06-02 RX ADMIN — INSULIN LISPRO 1 UNITS: 100 INJECTION, SOLUTION INTRAVENOUS; SUBCUTANEOUS at 12:34

## 2017-06-02 RX ADMIN — FLUTICASONE PROPIONATE AND SALMETEROL 1 PUFF: 50; 100 POWDER RESPIRATORY (INHALATION) at 09:41

## 2017-06-02 RX ADMIN — INSULIN ASPART 14 UNITS: 100 INJECTION, SUSPENSION SUBCUTANEOUS at 09:40

## 2017-06-02 RX ADMIN — Medication 325 MG: at 09:40

## 2017-06-02 RX ADMIN — BISACODYL 10 MG: 5 TABLET, COATED ORAL at 09:40

## 2017-06-02 RX ADMIN — PREDNISONE 40 MG: 20 TABLET ORAL at 09:40

## 2017-06-02 RX ADMIN — LEVALBUTEROL HYDROCHLORIDE 1.25 MG: 1.25 SOLUTION, CONCENTRATE RESPIRATORY (INHALATION) at 13:50

## 2017-06-02 RX ADMIN — GUAIFENESIN 600 MG: 600 TABLET, EXTENDED RELEASE ORAL at 09:40

## 2017-06-02 RX ADMIN — IPRATROPIUM BROMIDE 0.5 MG: 0.5 SOLUTION RESPIRATORY (INHALATION) at 13:50

## 2017-06-02 RX ADMIN — INSULIN LISPRO 1 UNITS: 100 INJECTION, SOLUTION INTRAVENOUS; SUBCUTANEOUS at 09:40

## 2017-06-02 RX ADMIN — SOTALOL HYDROCHLORIDE 80 MG: 80 TABLET ORAL at 09:40

## 2017-06-02 RX ADMIN — LEVALBUTEROL HYDROCHLORIDE 1.25 MG: 1.25 SOLUTION, CONCENTRATE RESPIRATORY (INHALATION) at 07:52

## 2017-06-02 RX ADMIN — FUROSEMIDE 40 MG: 40 TABLET ORAL at 09:40

## 2017-06-02 RX ADMIN — PANTOPRAZOLE SODIUM 40 MG: 40 TABLET, DELAYED RELEASE ORAL at 09:40

## 2017-06-25 ENCOUNTER — GENERIC CONVERSION - ENCOUNTER (OUTPATIENT)
Dept: OTHER | Facility: OTHER | Age: 82
End: 2017-06-25

## 2017-06-25 ENCOUNTER — APPOINTMENT (EMERGENCY)
Dept: RADIOLOGY | Facility: HOSPITAL | Age: 82
DRG: 436 | End: 2017-06-25
Payer: COMMERCIAL

## 2017-06-25 ENCOUNTER — HOSPITAL ENCOUNTER (INPATIENT)
Facility: HOSPITAL | Age: 82
LOS: 10 days | Discharge: HOME HEALTH | DRG: 436 | End: 2017-07-08
Attending: EMERGENCY MEDICINE | Admitting: INTERNAL MEDICINE
Payer: COMMERCIAL

## 2017-06-25 DIAGNOSIS — K86.89 PANCREATIC MASS: ICD-10-CM

## 2017-06-25 DIAGNOSIS — R63.0 LOSS OF APPETITE: ICD-10-CM

## 2017-06-25 DIAGNOSIS — K76.9 LIVER LESION: ICD-10-CM

## 2017-06-25 DIAGNOSIS — R11.0 NAUSEA: ICD-10-CM

## 2017-06-25 DIAGNOSIS — J90 PLEURAL EFFUSION, LEFT: Primary | ICD-10-CM

## 2017-06-25 DIAGNOSIS — R53.83 FATIGUE: ICD-10-CM

## 2017-06-25 DIAGNOSIS — K44.9 HIATAL HERNIA: ICD-10-CM

## 2017-06-25 PROCEDURE — 80053 COMPREHEN METABOLIC PANEL: CPT | Performed by: EMERGENCY MEDICINE

## 2017-06-25 PROCEDURE — 84484 ASSAY OF TROPONIN QUANT: CPT

## 2017-06-25 PROCEDURE — 85025 COMPLETE CBC W/AUTO DIFF WBC: CPT | Performed by: EMERGENCY MEDICINE

## 2017-06-25 PROCEDURE — 83690 ASSAY OF LIPASE: CPT | Performed by: EMERGENCY MEDICINE

## 2017-06-25 PROCEDURE — 36415 COLL VENOUS BLD VENIPUNCTURE: CPT | Performed by: EMERGENCY MEDICINE

## 2017-06-25 PROCEDURE — 93005 ELECTROCARDIOGRAM TRACING: CPT | Performed by: EMERGENCY MEDICINE

## 2017-06-25 PROCEDURE — 71020 HB CHEST X-RAY 2VW FRONTAL&LATL: CPT

## 2017-06-25 RX ORDER — MAGNESIUM HYDROXIDE/ALUMINUM HYDROXICE/SIMETHICONE 120; 1200; 1200 MG/30ML; MG/30ML; MG/30ML
30 SUSPENSION ORAL ONCE
Status: COMPLETED | OUTPATIENT
Start: 2017-06-25 | End: 2017-06-25

## 2017-06-25 RX ADMIN — ALUMINUM HYDROXIDE, MAGNESIUM HYDROXIDE, AND SIMETHICONE 30 ML: 200; 200; 20 SUSPENSION ORAL at 23:38

## 2017-06-25 RX ADMIN — LIDOCAINE HYDROCHLORIDE 15 ML: 20 SOLUTION ORAL; TOPICAL at 23:38

## 2017-06-26 ENCOUNTER — APPOINTMENT (OUTPATIENT)
Dept: RADIOLOGY | Facility: HOSPITAL | Age: 82
DRG: 436 | End: 2017-06-26
Payer: COMMERCIAL

## 2017-06-26 ENCOUNTER — GENERIC CONVERSION - ENCOUNTER (OUTPATIENT)
Dept: OTHER | Facility: OTHER | Age: 82
End: 2017-06-26

## 2017-06-26 PROBLEM — J90 PLEURAL EFFUSION: Status: ACTIVE | Noted: 2017-06-26

## 2017-06-26 PROBLEM — R63.0 LOSS OF APPETITE: Status: ACTIVE | Noted: 2017-06-26

## 2017-06-26 PROBLEM — R11.0 NAUSEA: Status: ACTIVE | Noted: 2017-06-26

## 2017-06-26 PROBLEM — I48.91 A-FIB (HCC): Status: ACTIVE | Noted: 2017-06-26

## 2017-06-26 LAB
ALBUMIN SERPL BCP-MCNC: 2.9 G/DL (ref 3.5–5)
ALP SERPL-CCNC: 85 U/L (ref 46–116)
ALT SERPL W P-5'-P-CCNC: 15 U/L (ref 12–78)
ANION GAP SERPL CALCULATED.3IONS-SCNC: 12 MMOL/L (ref 4–13)
ANION GAP SERPL CALCULATED.3IONS-SCNC: 9 MMOL/L (ref 4–13)
AST SERPL W P-5'-P-CCNC: 14 U/L (ref 5–45)
ATRIAL RATE: 75 BPM
BASOPHILS # BLD AUTO: 0 THOUSANDS/ΜL (ref 0–0.1)
BASOPHILS NFR BLD AUTO: 0 % (ref 0–1)
BILIRUB SERPL-MCNC: 0.52 MG/DL (ref 0.2–1)
BUN SERPL-MCNC: 30 MG/DL (ref 5–25)
BUN SERPL-MCNC: 31 MG/DL (ref 5–25)
CALCIUM SERPL-MCNC: 8.6 MG/DL (ref 8.3–10.1)
CALCIUM SERPL-MCNC: 8.9 MG/DL (ref 8.3–10.1)
CHLORIDE SERPL-SCNC: 101 MMOL/L (ref 100–108)
CHLORIDE SERPL-SCNC: 104 MMOL/L (ref 100–108)
CO2 SERPL-SCNC: 24 MMOL/L (ref 21–32)
CO2 SERPL-SCNC: 25 MMOL/L (ref 21–32)
CREAT SERPL-MCNC: 2 MG/DL (ref 0.6–1.3)
CREAT SERPL-MCNC: 2.1 MG/DL (ref 0.6–1.3)
EOSINOPHIL # BLD AUTO: 0.05 THOUSAND/ΜL (ref 0–0.61)
EOSINOPHIL NFR BLD AUTO: 1 % (ref 0–6)
EOSINOPHIL NFR FLD MANUAL: 2 %
ERYTHROCYTE [DISTWIDTH] IN BLOOD BY AUTOMATED COUNT: 15.3 % (ref 11.6–15.1)
ERYTHROCYTE [DISTWIDTH] IN BLOOD BY AUTOMATED COUNT: 15.4 % (ref 11.6–15.1)
EST. AVERAGE GLUCOSE BLD GHB EST-MCNC: 180 MG/DL
GFR SERPL CREATININE-BSD FRML MDRD: 30 ML/MIN/1.73SQ M
GFR SERPL CREATININE-BSD FRML MDRD: 31.7 ML/MIN/1.73SQ M
GLUCOSE FLD-MCNC: 190 MG/DL
GLUCOSE P FAST SERPL-MCNC: 167 MG/DL (ref 65–99)
GLUCOSE SERPL-MCNC: 166 MG/DL (ref 65–140)
GLUCOSE SERPL-MCNC: 167 MG/DL (ref 65–140)
GLUCOSE SERPL-MCNC: 176 MG/DL (ref 65–140)
GLUCOSE SERPL-MCNC: 186 MG/DL (ref 65–140)
GLUCOSE SERPL-MCNC: 317 MG/DL (ref 65–140)
HBA1C MFR BLD: 7.9 % (ref 4.2–6.3)
HCT VFR BLD AUTO: 28.8 % (ref 36.5–49.3)
HCT VFR BLD AUTO: 31.5 % (ref 36.5–49.3)
HGB BLD-MCNC: 10.5 G/DL (ref 12–17)
HGB BLD-MCNC: 9.4 G/DL (ref 12–17)
INR PPP: 1.86 (ref 0.86–1.16)
LDH FLD L TO P-CCNC: 319 U/L
LDH SERPL-CCNC: 321 U/L (ref 81–234)
LIPASE SERPL-CCNC: 61 U/L (ref 73–393)
LYMPHOCYTES # BLD AUTO: 0.62 THOUSANDS/ΜL (ref 0.6–4.47)
LYMPHOCYTES NFR BLD AUTO: 83 %
LYMPHOCYTES NFR BLD AUTO: 9 % (ref 14–44)
MAGNESIUM SERPL-MCNC: 1.8 MG/DL (ref 1.6–2.6)
MCH RBC QN AUTO: 29.7 PG (ref 26.8–34.3)
MCH RBC QN AUTO: 30.3 PG (ref 26.8–34.3)
MCHC RBC AUTO-ENTMCNC: 32.6 G/DL (ref 31.4–37.4)
MCHC RBC AUTO-ENTMCNC: 33.3 G/DL (ref 31.4–37.4)
MCV RBC AUTO: 91 FL (ref 82–98)
MCV RBC AUTO: 91 FL (ref 82–98)
MONOCYTES # BLD AUTO: 0.99 THOUSAND/ΜL (ref 0.17–1.22)
MONOCYTES NFR BLD AUTO: 14 % (ref 4–12)
MONOCYTES NFR BLD AUTO: 9 %
NEUTROPHILS # BLD AUTO: 5.38 THOUSANDS/ΜL (ref 1.85–7.62)
NEUTS SEG NFR BLD AUTO: 6 %
NEUTS SEG NFR BLD AUTO: 76 % (ref 43–75)
NRBC BLD AUTO-RTO: 0 /100 WBCS
P AXIS: 67 DEGREES
PH BODY FLUID: 7.4
PHOSPHATE SERPL-MCNC: 2.4 MG/DL (ref 2.3–4.1)
PLATELET # BLD AUTO: 126 THOUSANDS/UL (ref 149–390)
PLATELET # BLD AUTO: 157 THOUSANDS/UL (ref 149–390)
PMV BLD AUTO: 10.4 FL (ref 8.9–12.7)
PMV BLD AUTO: 9.9 FL (ref 8.9–12.7)
POTASSIUM SERPL-SCNC: 3.7 MMOL/L (ref 3.5–5.3)
POTASSIUM SERPL-SCNC: 3.7 MMOL/L (ref 3.5–5.3)
PR INTERVAL: 224 MS
PROT FLD-MCNC: 3.9 G/DL
PROT SERPL-MCNC: 7.1 G/DL (ref 6.4–8.2)
PROTHROMBIN TIME: 21.6 SECONDS (ref 12.1–14.4)
QRS AXIS: 5 DEGREES
QRSD INTERVAL: 82 MS
QT INTERVAL: 396 MS
QTC INTERVAL: 442 MS
RBC # BLD AUTO: 3.16 MILLION/UL (ref 3.88–5.62)
RBC # BLD AUTO: 3.47 MILLION/UL (ref 3.88–5.62)
SITE: NORMAL
SODIUM SERPL-SCNC: 137 MMOL/L (ref 136–145)
SODIUM SERPL-SCNC: 138 MMOL/L (ref 136–145)
SPECIMEN SOURCE: NORMAL
T WAVE AXIS: 49 DEGREES
TOTAL CELLS COUNTED SPEC: 100
TROPONIN I BLD-MCNC: 0.01 NG/ML (ref 0–0.08)
VENTRICULAR RATE: 75 BPM
WBC # BLD AUTO: 3.99 THOUSAND/UL (ref 4.31–10.16)
WBC # BLD AUTO: 7.06 THOUSAND/UL (ref 4.31–10.16)
WBC # FLD MANUAL: 1118 /UL

## 2017-06-26 PROCEDURE — 89051 BODY FLUID CELL COUNT: CPT | Performed by: NURSE PRACTITIONER

## 2017-06-26 PROCEDURE — 0W9B3ZX DRAINAGE OF LEFT PLEURAL CAVITY, PERCUTANEOUS APPROACH, DIAGNOSTIC: ICD-10-PCS | Performed by: RADIOLOGY

## 2017-06-26 PROCEDURE — 84157 ASSAY OF PROTEIN OTHER: CPT | Performed by: NURSE PRACTITIONER

## 2017-06-26 PROCEDURE — 87206 SMEAR FLUORESCENT/ACID STAI: CPT | Performed by: NURSE PRACTITIONER

## 2017-06-26 PROCEDURE — 85610 PROTHROMBIN TIME: CPT | Performed by: EMERGENCY MEDICINE

## 2017-06-26 PROCEDURE — 80048 BASIC METABOLIC PNL TOTAL CA: CPT | Performed by: INTERNAL MEDICINE

## 2017-06-26 PROCEDURE — 87070 CULTURE OTHR SPECIMN AEROBIC: CPT | Performed by: NURSE PRACTITIONER

## 2017-06-26 PROCEDURE — 71250 CT THORAX DX C-: CPT

## 2017-06-26 PROCEDURE — 83036 HEMOGLOBIN GLYCOSYLATED A1C: CPT | Performed by: INTERNAL MEDICINE

## 2017-06-26 PROCEDURE — 84100 ASSAY OF PHOSPHORUS: CPT | Performed by: INTERNAL MEDICINE

## 2017-06-26 PROCEDURE — 83735 ASSAY OF MAGNESIUM: CPT | Performed by: INTERNAL MEDICINE

## 2017-06-26 PROCEDURE — 85027 COMPLETE CBC AUTOMATED: CPT | Performed by: INTERNAL MEDICINE

## 2017-06-26 PROCEDURE — 99285 EMERGENCY DEPT VISIT HI MDM: CPT

## 2017-06-26 PROCEDURE — 83615 LACTATE (LD) (LDH) ENZYME: CPT | Performed by: NURSE PRACTITIONER

## 2017-06-26 PROCEDURE — C9113 INJ PANTOPRAZOLE SODIUM, VIA: HCPCS | Performed by: INTERNAL MEDICINE

## 2017-06-26 PROCEDURE — 88112 CYTOPATH CELL ENHANCE TECH: CPT | Performed by: NURSE PRACTITIONER

## 2017-06-26 PROCEDURE — 87116 MYCOBACTERIA CULTURE: CPT | Performed by: NURSE PRACTITIONER

## 2017-06-26 PROCEDURE — 94760 N-INVAS EAR/PLS OXIMETRY 1: CPT

## 2017-06-26 PROCEDURE — 82948 REAGENT STRIP/BLOOD GLUCOSE: CPT

## 2017-06-26 PROCEDURE — 87205 SMEAR GRAM STAIN: CPT | Performed by: NURSE PRACTITIONER

## 2017-06-26 PROCEDURE — 32555 ASPIRATE PLEURA W/ IMAGING: CPT

## 2017-06-26 PROCEDURE — 82945 GLUCOSE OTHER FLUID: CPT | Performed by: NURSE PRACTITIONER

## 2017-06-26 PROCEDURE — 83986 ASSAY PH BODY FLUID NOS: CPT | Performed by: NURSE PRACTITIONER

## 2017-06-26 PROCEDURE — 36415 COLL VENOUS BLD VENIPUNCTURE: CPT | Performed by: EMERGENCY MEDICINE

## 2017-06-26 RX ORDER — ACETAMINOPHEN 325 MG/1
650 TABLET ORAL EVERY 6 HOURS PRN
Status: DISCONTINUED | OUTPATIENT
Start: 2017-06-26 | End: 2017-07-04

## 2017-06-26 RX ORDER — SOTALOL HYDROCHLORIDE 80 MG/1
80 TABLET ORAL DAILY
Status: DISCONTINUED | OUTPATIENT
Start: 2017-06-26 | End: 2017-07-08 | Stop reason: HOSPADM

## 2017-06-26 RX ORDER — INSULIN ASPART 100 [IU]/ML
6 INJECTION, SUSPENSION SUBCUTANEOUS
Status: DISCONTINUED | OUTPATIENT
Start: 2017-06-26 | End: 2017-07-08 | Stop reason: HOSPADM

## 2017-06-26 RX ORDER — HYDROCODONE BITARTRATE AND ACETAMINOPHEN 5; 325 MG/1; MG/1
1 TABLET ORAL EVERY 6 HOURS PRN
Status: DISCONTINUED | OUTPATIENT
Start: 2017-06-26 | End: 2017-07-05

## 2017-06-26 RX ORDER — ZOLPIDEM TARTRATE 5 MG/1
5 TABLET ORAL
Status: DISCONTINUED | OUTPATIENT
Start: 2017-06-26 | End: 2017-07-08 | Stop reason: HOSPADM

## 2017-06-26 RX ORDER — ASPIRIN 81 MG/1
81 TABLET, CHEWABLE ORAL 2 TIMES WEEKLY
Status: DISCONTINUED | OUTPATIENT
Start: 2017-06-26 | End: 2017-07-08 | Stop reason: HOSPADM

## 2017-06-26 RX ORDER — GUAIFENESIN 600 MG
600 TABLET, EXTENDED RELEASE 12 HR ORAL EVERY 12 HOURS SCHEDULED
Status: DISCONTINUED | OUTPATIENT
Start: 2017-06-26 | End: 2017-07-03

## 2017-06-26 RX ORDER — FERROUS SULFATE 325(65) MG
325 TABLET ORAL 2 TIMES DAILY
Status: DISCONTINUED | OUTPATIENT
Start: 2017-06-26 | End: 2017-07-08 | Stop reason: HOSPADM

## 2017-06-26 RX ORDER — WARFARIN SODIUM 2.5 MG/1
2.5 TABLET ORAL
Status: DISCONTINUED | OUTPATIENT
Start: 2017-06-26 | End: 2017-07-02

## 2017-06-26 RX ORDER — MAGNESIUM HYDROXIDE/ALUMINUM HYDROXICE/SIMETHICONE 120; 1200; 1200 MG/30ML; MG/30ML; MG/30ML
5 SUSPENSION ORAL EVERY 6 HOURS PRN
Status: DISCONTINUED | OUTPATIENT
Start: 2017-06-26 | End: 2017-07-08 | Stop reason: HOSPADM

## 2017-06-26 RX ORDER — WARFARIN SODIUM 5 MG/1
5 TABLET ORAL
Status: DISCONTINUED | OUTPATIENT
Start: 2017-06-27 | End: 2017-06-27

## 2017-06-26 RX ORDER — PANTOPRAZOLE SODIUM 40 MG/1
40 INJECTION, POWDER, FOR SOLUTION INTRAVENOUS EVERY 12 HOURS SCHEDULED
Status: DISCONTINUED | OUTPATIENT
Start: 2017-06-26 | End: 2017-06-29

## 2017-06-26 RX ORDER — INSULIN ASPART 100 [IU]/ML
14 INJECTION, SUSPENSION SUBCUTANEOUS
Status: DISCONTINUED | OUTPATIENT
Start: 2017-06-26 | End: 2017-07-04

## 2017-06-26 RX ORDER — SODIUM CHLORIDE 9 MG/ML
75 INJECTION, SOLUTION INTRAVENOUS ONCE
Status: COMPLETED | OUTPATIENT
Start: 2017-06-26 | End: 2017-06-27

## 2017-06-26 RX ORDER — ONDANSETRON 2 MG/ML
4 INJECTION INTRAMUSCULAR; INTRAVENOUS EVERY 6 HOURS PRN
Status: DISCONTINUED | OUTPATIENT
Start: 2017-06-26 | End: 2017-07-08 | Stop reason: HOSPADM

## 2017-06-26 RX ADMIN — HYDROCODONE BITARTRATE AND ACETAMINOPHEN 1 TABLET: 5; 325 TABLET ORAL at 20:34

## 2017-06-26 RX ADMIN — INSULIN LISPRO 1 UNITS: 100 INJECTION, SOLUTION INTRAVENOUS; SUBCUTANEOUS at 17:19

## 2017-06-26 RX ADMIN — INSULIN ASPART 14 UNITS: 100 INJECTION, SUSPENSION SUBCUTANEOUS at 08:07

## 2017-06-26 RX ADMIN — ASPIRIN 81 MG 81 MG: 81 TABLET ORAL at 08:06

## 2017-06-26 RX ADMIN — INSULIN LISPRO 1 UNITS: 100 INJECTION, SOLUTION INTRAVENOUS; SUBCUTANEOUS at 08:08

## 2017-06-26 RX ADMIN — FLUTICASONE PROPIONATE AND SALMETEROL 1 PUFF: 50; 250 POWDER RESPIRATORY (INHALATION) at 20:36

## 2017-06-26 RX ADMIN — INSULIN LISPRO 1 UNITS: 100 INJECTION, SOLUTION INTRAVENOUS; SUBCUTANEOUS at 12:26

## 2017-06-26 RX ADMIN — INSULIN LISPRO 3 UNITS: 100 INJECTION, SOLUTION INTRAVENOUS; SUBCUTANEOUS at 22:00

## 2017-06-26 RX ADMIN — Medication 325 MG: at 17:19

## 2017-06-26 RX ADMIN — GUAIFENESIN 600 MG: 600 TABLET, EXTENDED RELEASE ORAL at 08:06

## 2017-06-26 RX ADMIN — INSULIN ASPART 6 UNITS: 100 INJECTION, SUSPENSION SUBCUTANEOUS at 17:18

## 2017-06-26 RX ADMIN — BISACODYL 5 MG: 5 TABLET, COATED ORAL at 08:06

## 2017-06-26 RX ADMIN — WARFARIN SODIUM 2.5 MG: 2.5 TABLET ORAL at 17:22

## 2017-06-26 RX ADMIN — GUAIFENESIN 600 MG: 600 TABLET, EXTENDED RELEASE ORAL at 20:34

## 2017-06-26 RX ADMIN — PANTOPRAZOLE SODIUM 40 MG: 40 INJECTION, POWDER, FOR SOLUTION INTRAVENOUS at 08:12

## 2017-06-26 RX ADMIN — FLUTICASONE PROPIONATE AND SALMETEROL 1 PUFF: 50; 250 POWDER RESPIRATORY (INHALATION) at 08:18

## 2017-06-26 RX ADMIN — BISACODYL 5 MG: 5 TABLET, COATED ORAL at 17:19

## 2017-06-26 RX ADMIN — SODIUM CHLORIDE 75 ML/HR: 0.9 INJECTION, SOLUTION INTRAVENOUS at 03:10

## 2017-06-26 RX ADMIN — PANTOPRAZOLE SODIUM 40 MG: 40 INJECTION, POWDER, FOR SOLUTION INTRAVENOUS at 20:34

## 2017-06-26 RX ADMIN — SOTALOL HYDROCHLORIDE 80 MG: 80 TABLET ORAL at 08:18

## 2017-06-26 RX ADMIN — Medication 325 MG: at 08:06

## 2017-06-26 RX ADMIN — ZOLPIDEM TARTRATE 5 MG: 5 TABLET, FILM COATED ORAL at 23:10

## 2017-06-26 NOTE — CASE MANAGEMENT
[id number] [facility] [address]  Main Number [phone]; Fax [phone]    L&D/PEDS/NICU Medical Necessity Denials should be called to the UR Nurses    ATTENTION: Be aware that we have recently moved to a new  office  We have a new phone and fax for the Network Utilization Review Department  Call with any questions or concerns to [phone] and follow the prompts  All voicemails are confidential  Fax determinations, denials, and requests for initial/continue stay review clinical to [phone]  For all requests, it would be easier if you could please send logs  *Due to high call volume, we can respond faster if you email us to our secured email listed above  *  Initial Clinical Review    Admission: Date/Time/Statement: [email or web address]    Orders Placed This Encounter   Procedures    Place in Observation (expected length of stay for this patient is less than two midnights)     Standing Status:   Standing     Number of Occurrences:   1     Order Specific Question:   Admitting Physician     Answer:   David George [[id number]]     Order Specific Question:   Level of Care     Answer:   Med Surg [16]         ED: Date/Time/Mode of Arrival:   ED Arrival Information     Expected Arrival Acuity Means of Arrival Escorted By Service Admission Type    - 6/25/2017 22:14 Urgent Walk-In Self General Medicine Urgent    Arrival Complaint    abdominal pain          Chief Complaint:   Chief Complaint   Patient presents with    Nausea     persistent nausea and decreased appetite for 3 weeks       History of Illness:Kumar Meehan is a 80 y o  male who was admitted recently on May 29 up to June 2, 2017 for a cough  He was recently diagnosed to have tracheobronchitis  Patient was initially on cefepime IV however was discharged on ciprofloxacin   Likewise, he received a short course of steroids and was discharged with prednisone 10 mg daily for a total of 5 days      Instrument time, the patient is claiming that he has no appetite to eat in fact he feels "sick" whenever he looks at food  Otherwise, the patient does not have any fever  He does complain of being sick to the stomach and points at mid epigastric area  Patient likewise claims that every time he takes food there is somewhat increased epigastric discomfort      Looking at his laboratories, patient has hemoglobin of 10 and 5 initially was 12 5  He also has ongoing chronic kidney disease with a creatinine of 2 1 when it is always at 2 02 14 his BUN is at 12      In any case, a chest x-ray that was taken and revealed the presence of left pleural effusion  The patient does not seem to clear in any distress and in fact claims that he can breathe okay  However, the patient still has some nausea  ED Vital Signs:   ED Triage Vitals [06/25/17 2228]   Temperature Pulse Respirations Blood Pressure SpO2   (!) 97 3 °F (36 3 °C) 82 18 159/70 95 %      Temp Source Heart Rate Source Patient Position BP Location FiO2 (%)   Tympanic Monitor Sitting Right arm --      Pain Score       No Pain        Wt Readings from Last 1 Encounters:   06/25/17 83 5 kg (184 lb)       Vital Signs (abnormal): Abnormal Labs/Diagnostic Test Results:   HEMOGLOBIN g/dL 10 5*   HEMATOCRIT % 31 5*   PLATELETS Thousands/uL 157   NEUTROS PCT % 76*   LYMPHS PCT % 9*   MONOS PCT % 14*     BUN mg/dL 31*   CREATININE mg/dL 2 10*     GLUCOSE RANDOM mg/dL 166*       CXR:Hiatal hernia  Pacemaker  No acute findings    Faint stable nodularity in the left upper lobe     IR THORACENTESIS: Impression: Successful therapeutic/diagnostic ultrasound-guided thoracentesis        ED Treatment:   Medication Administration from 06/25/2017 2214 to 06/26/2017 0239       Date/Time Order Dose Route Action Action by Comments     06/25/2017 2338 lidocaine viscous (XYLOCAINE) 2 % mucosal solution 15 mL 15 mL Swish & Spit Given Dylon Rodríguez, SILVIA      47/07/2988 2332 aluminum-magnesium hydroxide-simethicone (MYLANTA) 200-200-20 mg/5 mL oral suspension 30 mL 30 mL Oral Given Parviz Gauthier RN           Past Medical/Surgical History: Active Ambulatory Problems     Diagnosis Date Noted    DVT (deep venous thrombosis) 03/08/2016    Acute deep vein thrombosis (DVT) of femoral vein of right lower extremity 03/08/2016    DM2 (diabetes mellitus, type 2) 03/08/2016    Chronic ulcer of right foot 03/08/2016    CKD (chronic kidney disease) stage 3, GFR 30-59 ml/min 03/08/2016    Peripheral vascular disease 03/08/2016    COPD (chronic obstructive pulmonary disease) 03/08/2016    GERD (gastroesophageal reflux disease) 03/08/2016    History of cirrhosis 03/08/2016    Pancytopenia 03/10/2016    Cough 05/29/2017    Chronic diastolic congestive heart failure 05/29/2017    History of DVT (deep vein thrombosis) 05/29/2017    Acute tracheobronchitis 06/02/2017     Resolved Ambulatory Problems     Diagnosis Date Noted    Chronic systolic heart failure 52/83/4700     Past Medical History:   Diagnosis Date    Atrial fibrillation     CHF (congestive heart failure)     Chronic kidney disease (CKD), stage III (moderate)     Chronic venous hypertension with ulcer     COPD (chronic obstructive pulmonary disease)     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     GERD (gastroesophageal reflux disease)     Hypertension     PVD (peripheral vascular disease)        Admitting Diagnosis: Loss of appetite [R63 0]  Nausea [R11 0]  Fatigue [R53 83]  Pleural effusion, left [J90]    Age/Sex: 80 y o  male    Assessment/Plan:   Hospital Problem List:      Principal Problem:    Pleural effusion  Active Problems:    GERD (gastroesophageal reflux disease)    Loss of appetite    Nausea    DM2 (diabetes mellitus, type 2)    Acute tracheobronchitis        Plan for the Primary Problem(s):  · Observation, medical surgical floor  · Pleural effusion   We will refer to IR for drainage and subsequent studies  · Loss of appetite  Patient currently claims that he has nausea looking at food  But we would like to find out whether the nausea may be related to current pleural effusion  Patient does not look to be in any distress  Will also get nutritional consult regards to caloric requirements and intake  We will leave to day team whether the patient may need further workup such as gastroenterology consult for possible scope  Patient placed on Protonix 40 mg IV twice daily      Plan for Additional Problem(s):   · Diabetes mellitus  We'll continue with usual dose of 70/30 at 14 units in the morning and 6 units before dinner  Also, we'll place patient on sliding scale      VTE Prophylaxis: Warfarin (Coumadin)  / sequential compression device   Code Status: Level 3 - DNAR and DNI   POLST: There is no POLST form on file for this patient (pre-hospital)     Anticipated Length of Stay:  Patient will be admitted on an Observation basis with an anticipated length of stay of  less than 2 midnights     Justification for Hospital Stay: Please see detailed plans noted above        Admission Orders:  OBS  OOB  CONS CARB DIET  Scheduled Meds:   aspirin 81 mg Oral Once per day on Mon Thu   bisacodyl 5 mg Oral BID   ferrous sulfate 325 mg Oral BID   fluticasone-salmeterol 1 puff Inhalation Q12H DEMIAN   guaiFENesin 600 mg Oral Q12H [address]   insulin aspart protamine-insulin aspart 14 Units Subcutaneous Early Morning   insulin aspart protamine-insulin aspart 6 Units Subcutaneous Before Dinner   insulin lispro 1-5 Units Subcutaneous HS   insulin lispro 1-6 Units Subcutaneous TID AC   pantoprazole 40 mg Intravenous Q12H [address]   sotalol 80 mg Oral Daily   warfarin 2 5 mg Oral Once per day on Sun Mon Wed Thu Fri Sat   [START ON 6/27/2017] warfarin 5 mg Oral Once per day on Tue   zolpidem 5 mg Oral HS     Continuous Infusions:    PRN Meds:   acetaminophen    aluminum-magnesium hydroxide-simethicone   HYDROcodone-acetaminophen    ondansetron    6/27 PROGRESS NOTE:  * Nausea   Assessment & Plan                            Plan:   · GI consult        A-fib   Assessment & Plan                            Plan:   · Rate controlled on sotalol  · INR subtherapeutic at 1 58  Continue Coumadin 5 mg tonight and check INR in the morning  Add subcutaneous heparin until INR is therapeutic        Pleural effusion   Assessment & Plan                            Assessment: Exudative                            Plan:   · Status post thoracentesis for 525 cc of serosanguineous fluid  Fluid analysis is pending  · Pulmonary is following  · CT chest results are pending  · To consider underlying malignancy        Loss of appetite   Assessment & Plan                            Plan:   · GI consult       GERD (gastroesophageal reflux disease)   Assessment & Plan                            Plan:   · Continue Protonix b i d   · Consult GI for ongoing nausea  · Add Carafate        DM2 (diabetes mellitus, type 2)   Assessment & Plan                            Plan:   · Hemoglobin A1c 7 9  · Blood sugars: 317, 176, 186  · Continue current insulin regimen including 70/30 14 units at breakfast and 6 units at dinner  · Continue sliding scale insulin and Accu-Cheks           VTE Pharmacologic Prophylaxis:   Pharmacologic: Will add heparin SC until INR is therapeutic  Mechanical: Mechanical VTE prophylaxis in place      Patient Centered Rounds: I have performed bedside rounds with nursing staff today  Discussions with Specialists or Other Care Team Provider: Patient's PCP, Dr Mague Lyn  Education and Discussions with Family / Patient: All patient questions answered to the best of my ability  Time Spent for Care: 20 minutes    More than 50% of total time spent on counseling and coordination of care as described above      Current Length of Stay: 0 day(s)  Current Patient Status: Observation   Certification Statement: Maintain observation status for now  Will obtain GI consult for persistent nausea      Discharge Plan: Patient is not yet medically stable for discharge  He resides at [facility] and will return there once stable  Code Status: Level 3 - DNAR and DNI     Subjective:   Patient states he is having persistent nausea which started during his last hospitalization when he was diagnosed with tracheobronchitis  Since that time, it has been worsening and keeps him awake at night  He denies any burning chest discomfort  He denies any acid brash  He has occasional belching  He's had several endoscopies in the past by Dr Nelia Ordoñez but is unaware of the results  He denies any shortness of breath or pleuritic chest pain  He denies any abdominal pain   He denies any vomiting

## 2017-06-26 NOTE — ED PROVIDER NOTES
History  Chief Complaint   Patient presents with    Nausea     persistent nausea and decreased appetite for 3 weeks     HPI    59-year-old male presenting from [address] with past medical history atrial fibrillation, CHF, COPD stage III, COPD, type 2 diabetes, GERD, hypertension, peripheral vascular disease presents chief complaint of persistent nausea and decreased appetite for the last 3 weeks  Patient was admitted 3 weeks ago for bronchitis  He states he just not want to eat  He states looks at food and just not want to eat it  Patient does admit to mild epigastric pain, coming and going, at its worse to 5 out of 10 with no radiation  Symptoms are made worse with food  Patient has tried Pepto-Bismol, which helped the symptoms  Describes the pain as burning  Patient states he feels like he has lost weight, on review of records patient does not lost weight  Denies fever chills rigors, headache, lightheadedness, dizziness  Patient denies neck pain, neck stiffness, chest pain, palpitations, shortness of breath, cough, pleurisy, vomiting, diarrhea, constipation, urinary symptoms, motor weakness, numbness and tingling, Night sweats  Patient does not have partial history of malignancy, patient's father  of unknown malignancy  Patient has a remote history of smoking currently is not use tobacco          Prior to Admission Medications   Prescriptions Last Dose Informant Patient Reported? Taking? HYDROcodone-acetaminophen (NORCO) 5-325 mg per tablet   Yes No   Sig: Take 1 tablet by mouth every 6 (six) hours as needed for moderate pain  aspirin 81 MG tablet   Yes No   Sig: Take 81 mg by mouth 2 (two) times a week   Wed and Sun    bisacodyl (DULCOLAX) 5 mg EC tablet   Yes No   Sig: Take 5 mg by mouth 2 (two) times a day   ferrous sulfate 325 (65 Fe) mg tablet   Yes No   Sig: Take 325 mg by mouth 2 (two) times a day   fluticasone furoate-vilanterol (BREO ELLIPTA)   Yes No   Sig: Inhale 1 puff daily   furosemide (LASIX) 40 mg tablet   Yes No   Sig: Take 40 mg by mouth 2 (two) times a day     guaiFENesin (MUCINEX) 600 mg 12 hr tablet   No No   Sig: Take 1 tablet by mouth every 12 (twelve) hours   insulin aspart protamine-insulin aspart (NovoLOG 70/30) 100 units/mL injection   Yes No   Sig: Inject 14 Units under the skin daily in the early morning   insulin aspart protamine-insulin aspart (NovoLOG 70/30) 100 units/mL injection   Yes No   Sig: Inject 6 Units under the skin daily before dinner   pantoprazole (PROTONIX) 40 mg tablet   Yes No   Sig: Take 40 mg by mouth daily  propranolol (INDERAL) 20 mg tablet   Yes No   Sig: Take 20 mg by mouth 2 (two) times a day   sotalol (BETAPACE) 80 mg tablet   No No   Sig: Take 1 tablet by mouth daily   warfarin (COUMADIN) 2 5 mg tablet   Yes No   Sig: Take 2 5 mg by mouth see administration instructions Take 2 5 mg on every day except Tuesday, take 5mg on Tuesday  zolpidem (AMBIEN) 5 mg tablet   Yes No   Sig: Take 5 mg by mouth daily at bedtime as needed for sleep      Facility-Administered Medications: None       Past Medical History:   Diagnosis Date    Atrial fibrillation     CHF (congestive heart failure)     Chronic kidney disease (CKD), stage III (moderate)     Chronic venous hypertension with ulcer     Right    COPD (chronic obstructive pulmonary disease)     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     HbA1C: 8/24/15: 6 5%    GERD (gastroesophageal reflux disease)     Hypertension     PVD (peripheral vascular disease)        Past Surgical History:   Procedure Laterality Date    CARDIAC PACEMAKER PLACEMENT      HIP FRACTURE SURGERY Right 08/2015       History reviewed  No pertinent family history  I have reviewed and agree with the history as documented      Social History   Substance Use Topics    Smoking status: Former Smoker    Smokeless tobacco: Not on file    Alcohol use No        Review of Systems   Constitutional: Positive for activity change and fatigue  Negative for appetite change, chills, diaphoresis, fever and unexpected weight change  HENT: Negative for congestion, ear discharge, ear pain, facial swelling, hearing loss, nosebleeds, postnasal drip, rhinorrhea, sinus pressure, sneezing, sore throat, tinnitus and trouble swallowing  Eyes: Negative for photophobia, pain, redness, itching and visual disturbance  Respiratory: Negative for cough, chest tightness, shortness of breath, wheezing and stridor  Cardiovascular: Negative for chest pain, palpitations and leg swelling  Gastrointestinal: Positive for abdominal pain and nausea  Negative for abdominal distention, anal bleeding, blood in stool, constipation, diarrhea and vomiting  Endocrine: Negative for cold intolerance, heat intolerance, polydipsia, polyphagia and polyuria  Genitourinary: Negative for decreased urine volume, difficulty urinating, dysuria, enuresis, flank pain, frequency, hematuria and urgency  Musculoskeletal: Negative for arthralgias, back pain, gait problem, joint swelling, myalgias, neck pain and neck stiffness  Skin: Negative for rash and wound  Allergic/Immunologic: Negative for environmental allergies, food allergies and immunocompromised state  Neurological: Negative for dizziness, tremors, seizures, syncope, facial asymmetry, speech difficulty, weakness, light-headedness, numbness and headaches  Hematological: Negative for adenopathy  Psychiatric/Behavioral: Negative for agitation, behavioral problems, confusion, decreased concentration, dysphoric mood, hallucinations, self-injury, sleep disturbance and suicidal ideas  The patient is not nervous/anxious and is not hyperactive          Physical Exam  ED Triage Vitals [06/25/17 2228]   Temperature Pulse Respirations Blood Pressure SpO2   (!) 97 3 °F (36 3 °C) 82 18 159/70 95 %      Temp Source Heart Rate Source Patient Position BP Location FiO2 (%)   Tympanic Monitor Sitting Right arm --      Pain Score       No Pain           Physical Exam   Constitutional: He is oriented to person, place, and time  He appears well-developed and well-nourished  He does not have a sickly appearance  He does not appear ill  No distress  HENT:   Head: Normocephalic and atraumatic  Right Ear: Hearing, tympanic membrane, external ear and ear canal normal  Tympanic membrane is not injected, not scarred and not perforated  No hemotympanum  Left Ear: Hearing, tympanic membrane, external ear and ear canal normal  Tympanic membrane is not injected, not scarred and not perforated  No hemotympanum  Nose: Nose normal  Right sinus exhibits no maxillary sinus tenderness and no frontal sinus tenderness  Left sinus exhibits no maxillary sinus tenderness and no frontal sinus tenderness  Mouth/Throat: Uvula is midline, oropharynx is clear and moist and mucous membranes are normal  No oropharyngeal exudate  Eyes: Conjunctivae and EOM are normal  Pupils are equal, round, and reactive to light  Right eye exhibits no discharge  Left eye exhibits no discharge  No scleral icterus  Neck: Normal range of motion  Neck supple  No JVD present  No tracheal deviation present  No thyromegaly present  Cardiovascular: Normal rate, regular rhythm, S1 normal, S2 normal, normal heart sounds and intact distal pulses  No murmur heard  Pulses:       Carotid pulses are 2+ on the right side, and 2+ on the left side  Dorsalis pedis pulses are 2+ on the right side, and 2+ on the left side  Posterior tibial pulses are 2+ on the right side, and 2+ on the left side  Pulmonary/Chest: Effort normal and breath sounds normal  No stridor  No respiratory distress  He has no decreased breath sounds  He has no wheezes  He has no rhonchi  He has no rales  Abdominal: Soft  Bowel sounds are normal  He exhibits no distension and no mass  There is tenderness in the epigastric area   There is no rigidity, no rebound, no guarding, no CVA tenderness, no tenderness at McBurney's point and negative Hamilton's sign  Genitourinary: Rectum normal  Rectal exam shows no external hemorrhoid, no internal hemorrhoid, no fissure, no mass, no tenderness, anal tone normal and guaiac negative stool  Musculoskeletal: Normal range of motion  He exhibits no edema, tenderness or deformity  Lymphadenopathy:     He has no cervical adenopathy  Neurological: He is alert and oriented to person, place, and time  No cranial nerve deficit  He exhibits normal muscle tone  Coordination normal  GCS eye subscore is 4  GCS verbal subscore is 5  GCS motor subscore is 6  Skin: Skin is warm and dry  No rash noted  He is not diaphoretic  No erythema  Psychiatric: He has a normal mood and affect  His behavior is normal  Judgment and thought content normal    Nursing note and vitals reviewed        ED Medications  Medications   aspirin chewable tablet 81 mg (not administered)   bisacodyl (DULCOLAX) EC tablet 5 mg (not administered)   ferrous sulfate tablet 325 mg (not administered)   fluticasone-salmeterol (ADVAIR) 250-50 mcg/dose inhaler 1 puff (not administered)   guaiFENesin (MUCINEX) 12 hr tablet 600 mg (not administered)   HYDROcodone-acetaminophen (NORCO) 5-325 mg per tablet 1 tablet (not administered)   insulin aspart protamine-insulin aspart (NovoLOG 70/30) 100 units/mL subcutaneous injection 14 Units (not administered)   insulin aspart protamine-insulin aspart (NovoLOG 70/30) 100 units/mL subcutaneous injection 6 Units (not administered)   pantoprazole (PROTONIX) injection 40 mg (not administered)   sotalol (BETAPACE) tablet 80 mg (not administered)   warfarin (COUMADIN) tablet 2 5 mg (not administered)   zolpidem (AMBIEN) tablet 5 mg (not administered)   ondansetron (ZOFRAN) injection 4 mg (not administered)   aluminum-magnesium hydroxide-simethicone (MYLANTA) 200-200-20 mg/5 mL oral suspension 5 mL (not administered) insulin lispro (HumaLOG) 100 units/mL subcutaneous injection 1-6 Units (not administered)   insulin lispro (HumaLOG) 100 units/mL subcutaneous injection 1-5 Units (not administered)   acetaminophen (TYLENOL) tablet 650 mg (not administered)   warfarin (COUMADIN) tablet 5 mg (not administered)   lidocaine viscous (XYLOCAINE) 2 % mucosal solution 15 mL (15 mL Swish & Spit Given 6/25/17 2338)   aluminum-magnesium hydroxide-simethicone (MYLANTA) 200-200-20 mg/5 mL oral suspension 30 mL (30 mL Oral Given 6/25/17 2338)   sodium chloride 0 9 % infusion (75 mL/hr Intravenous New Bag 6/26/17 0310)       Diagnostic Studies  Labs Reviewed   CBC AND DIFFERENTIAL - Abnormal        Result Value Ref Range Status    RBC 3 47 (*) 3 88 - 5 62 Million/uL Final    Hemoglobin 10 5 (*) 12 0 - 17 0 g/dL Final    Hematocrit 31 5 (*) 36 5 - 49 3 % Final    RDW 15 4 (*) 11 6 - 15 1 % Final    Neutrophils Relative 76 (*) 43 - 75 % Final    Lymphocytes Relative 9 (*) 14 - 44 % Final    Monocytes Relative 14 (*) 4 - 12 % Final    WBC 7 06  4 31 - 10 16 Thousand/uL Final    MCV 91  82 - 98 fL Final    MCH 30 3  26 8 - 34 3 pg Final    MCHC 33 3  31 4 - 37 4 g/dL Final    MPV 9 9  8 9 - 12 7 fL Final    Platelets 219  399 - 390 Thousands/uL Final    nRBC 0  /100 WBCs Final    Eosinophils Relative 1  0 - 6 % Final    Basophils Relative 0  0 - 1 % Final    Neutrophils Absolute 5 38  1 85 - 7 62 Thousands/µL Final    Lymphocytes Absolute 0 62  0 60 - 4 47 Thousands/µL Final    Monocytes Absolute 0 99  0 17 - 1 22 Thousand/µL Final    Eosinophils Absolute 0 05  0 00 - 0 61 Thousand/µL Final    Basophils Absolute 0 00  0 00 - 0 10 Thousands/µL Final   COMPREHENSIVE METABOLIC PANEL - Abnormal     BUN 31 (*) 5 - 25 mg/dL Final    Creatinine 2 10 (*) 0 60 - 1 30 mg/dL Final    Comment: Standardized to IDMS reference method    Glucose 166 (*) 65 - 140 mg/dL Final    Comment: If the patient is fasting, the ADA then defines impaired fasting glucose as > 100 mg/dL and diabetes as > or equal to 123 mg/dL  Albumin 2 9 (*) 3 5 - 5 0 g/dL Final    Sodium 137  136 - 145 mmol/L Final    Potassium 3 7  3 5 - 5 3 mmol/L Final    Chloride 101  100 - 108 mmol/L Final    CO2 24  21 - 32 mmol/L Final    Anion Gap 12  4 - 13 mmol/L Final    Calcium 8 9  8 3 - 10 1 mg/dL Final    AST 14  5 - 45 U/L Final    ALT 15  12 - 78 U/L Final    Alkaline Phosphatase 85  46 - 116 U/L Final    Total Protein 7 1  6 4 - 8 2 g/dL Final    Total Bilirubin 0 52  0 20 - 1 00 mg/dL Final    eGFR 30 0  ml/min/1 73sq m Final    Narrative:     National Kidney Disease Education Program recommendations are as follows:  GFR calculation is accurate only with a steady state creatinine  Chronic Kidney disease less than 60 ml/min/1 73 sq  meters  Kidney failure less than 15 ml/min/1 73 sq  meters  LIPASE - Abnormal     Lipase 61 (*) 73 - 393 u/L Final   PROTIME-INR - Abnormal     Protime 21 6 (*) 12 1 - 14 4 seconds Final    INR 1 86 (*) 0 86 - 1 16 Final   POCT TROPONIN - Normal    POC Troponin I 0 01  0 00 - 0 08 ng/ml Final    Specimen Type VENOUS   Final    Narrative:     [employer] i-Stat handheld analyzer 99% cutoff is > 0 08ng/mL in [facility]    o cTnI 99% cutoff is useful only when applied to patients in the clinical setting of myocardial ischemia  o cTnI 99% cutoff should be interpreted in the context of clinical history, ECG findings and possibly cardiac imaging to establish correct diagnosis  o cTnI 99% cutoff may be suggestive but clearly not indicative of a coronary event without the clinical setting of myocardial ischemia         XR chest 2 views   ED Interpretation   Left sided pleural effusion       IR consult    (Results Pending)       Procedures  ECG 12 Lead Documentation  Date/Time: 6/25/2017 11:51 PM  Performed by: Juan José Dinh  Authorized by: Paulina Farah     Indications / Diagnosis:  Nausea   ECG reviewed by me, the ED Provider: yes    Patient location: ED  Previous ECG:     Previous ECG:  Compared to current    Similarity:  Changes noted  Interpretation:     Interpretation: normal    Rate:     ECG rate:  75    ECG rate assessment: normal    Rhythm:     Rhythm: sinus rhythm    Ectopy:     Ectopy: PAC    QRS:     QRS axis:  Normal    QRS intervals:  Normal  Conduction:     Conduction: normal    ST segments:     ST segments:  Normal  T waves:     T waves: normal    Comments:      PACs new              Phone Consults  ED Phone Contact    ED Course  ED Course   Crescencio Kerns's Documentation   Value Comment Time    Weight - Scale: 84 3 kg (185 lb 13 6 oz) (06/02/17 0600 06/25 2340    Patient instructed of keflex instructions  06/25 2349   POC Troponin I: 0 01 ACS unlikely, no ischemic EKG 06/26 0020   Hemoglobin: (!) 10 5 Will hemoccult  06/26 0021   Creatinine: (!) 2 10 baseline 06/26 0046   INR: (!) 1 86 Subtherapeutic  06/26 0102   XR chest 2 views (Reviewed) 06/26 0117                             MDM  Number of Diagnoses or Management Options  Fatigue:   Nausea:   Pleural effusion, left:   Diagnosis management comments: 44-year-old male presenting with chief complaint of not wanting to eat normal fatigued for the last 3 weeks  On exam patient has epigastric pain, review of records patient has not lost weight  Differential diagnosis includes gastritis, GERD, ACS, occult malignancy  CBC shows hemoglobin of 10 5, guaiac was negative after drop in hemoglobin  Creatinine elevated at 2 1 which is baseline for him  EKG nonischemic, troponin negative  Chest x-ray showed a left-sided moderate pleural effusion which is new  Patient admitted for effusion workup, possible malignancy  Patient has progressive fatigue and possible failure to thrive  Patient admitted to [address] [place] [address]       CritCare Time    Disposition  Final diagnoses:   Pleural effusion, left   Nausea   Fatigue     ED Disposition     ED Disposition Condition Comment    Admit  Case was discussed with   Diana and the patient's admission status was agreed to be Admission Status: observation status to the service of Dr Stefan Hillman   Follow-up Information    None       Current Discharge Medication List      CONTINUE these medications which have NOT CHANGED    Details   aspirin 81 MG tablet Take 81 mg by mouth 2 (two) times a week  Wed and Sun       bisacodyl (DULCOLAX) 5 mg EC tablet Take 5 mg by mouth 2 (two) times a day      ferrous sulfate 325 (65 Fe) mg tablet Take 325 mg by mouth 2 (two) times a day      fluticasone furoate-vilanterol (BREO ELLIPTA) Inhale 1 puff daily      furosemide (LASIX) 40 mg tablet Take 40 mg by mouth 2 (two) times a day        guaiFENesin (MUCINEX) 600 mg 12 hr tablet Take 1 tablet by mouth every 12 (twelve) hours  Qty: 20 tablet, Refills: 0      HYDROcodone-acetaminophen (NORCO) 5-325 mg per tablet Take 1 tablet by mouth every 6 (six) hours as needed for moderate pain  !! insulin aspart protamine-insulin aspart (NovoLOG 70/30) 100 units/mL injection Inject 14 Units under the skin daily in the early morning      !! insulin aspart protamine-insulin aspart (NovoLOG 70/30) 100 units/mL injection Inject 6 Units under the skin daily before dinner      pantoprazole (PROTONIX) 40 mg tablet Take 40 mg by mouth daily  propranolol (INDERAL) 20 mg tablet Take 20 mg by mouth 2 (two) times a day      sotalol (BETAPACE) 80 mg tablet Take 1 tablet by mouth daily  Qty: 30 tablet, Refills: 0      warfarin (COUMADIN) 2 5 mg tablet Take 2 5 mg by mouth see administration instructions Take 2 5 mg on every day except Tuesday, take 5mg on Tuesday  zolpidem (AMBIEN) 5 mg tablet Take 5 mg by mouth daily at bedtime as needed for sleep       !! - Potential duplicate medications found  Please discuss with provider  No discharge procedures on file  ED Provider  Attending physically available and evaluated Caryle Pick I managed the patient along with the ED Attending      Electronically Signed by       Lorne Malik DO  Resident  06/26/17 6967

## 2017-06-26 NOTE — PROGRESS NOTES
[facility] Progress Note  Patient: Lilia Payne 80 y o  male   MRN: [id number]  PCP: Nika Bronson MD  Unit/Bed#: [facility] 713-01 Encounter: [id number]  Date Of Visit: 06/26/17    Assessment:    Principal Problem:    Pleural effusion  Active Problems:    DM2 (diabetes mellitus, type 2)    GERD (gastroesophageal reflux disease)    Acute tracheobronchitis    Loss of appetite    Nausea      Plan:    · Left pleural fluid: Consult and appreciate pulmonary  · - Per chest x-ray developing since 5/29/17, probable   atelectasis and pneumonia in the left lower base  Questionable 1 cm nodule in the right lower lobe  · - Scheduled for IR today thoracentesis, just returned doing well ordered lunch   · - ir drained 525 ml serosanguineous fluid   · - Studies added per request from IR and just discussed with pulmonary more studies added will need to monitor results   · Pt with recent admission for a cough may 29 to June 2, he was treated for tracheobronchitis at that time and discharge with ciprofloxacin and a short course of steroids  · guaifensin 600mg bid b    · Loss of  Appetite:   · -pt with nausea when he looks at food   · - placed pt on PP will continue at this time   · - pt was also recently on steroids question if contributed towards stomach issues, but is concerned about what he is about to eat   · - will add ensure chocolage  ·   · Diabetes type 2:   · - poor appetite  · - A 1c is 7 9  · - Patient is currently on 7030 he takes 14 units daily in the a m  and 6 units before dinner  · - On ADA diet  ·   · Atrial fibrillation:  · - inr subtherapeutic 1 86   · - now down for thoracentesis  · - tonight patient will take 5mg , patient usually takes 2 5 mg every day except Tuesday he takes 5 mg    · - currently on 5mg po   · - heart rate controlled on sotolol   ·   · Chronic kidney disease stage 3:   · In May  creatinine about 2 02 but one year ago bl was about 1 6  · Avoid nephrotoxic drugs         VTE Pharmacologic Prophylaxis:   Pharmacologic: Warfarin (Coumadin)  Mechanical VTE Prophylaxis in Place: Yes    Patient Centered Rounds: I have performed bedside rounds with nursing staff today  Discussions with Specialists or Other Care Team Provider: nursing    Education and Discussions with Family / Patient: patient and patients son ayesha dillon please call and let him know results of IR thoracentesis  Time Spent for Care: 30 minutes  More than 50% of total time spent on counseling and coordination of care as described above  Current Length of Stay: 0 day(s)    Current Patient Status: Observation   Certification Statement: The patient, admitted on an observation basis, will now require > 2 midnight hospital stay due to pt scheduled for thoracentesis     Discharge Plan / Estimated Discharge Date: discharge     Code Status: Level 3 - DNAR and DNI      Subjective:   Pt has no resp complaints at all  No pain no sob except does state it has been difficult to take a deep breath  No fevers or chills states he is here because of poor appetite  Objective:     Vitals:   Temp (24hrs), Av 1 °F (36 7 °C), Min:97 3 °F (36 3 °C), Max:98 6 °F (37 °C)    HR:  [69-82] 71  Resp:  [16-18] 18  BP: ()/(53-70) 158/66  SpO2:  [94 %-96 %] 94 %  Body mass index is 27 17 kg/m²  Input and Output Summary (last 24 hours): Intake/Output Summary (Last 24 hours) at 17 0990  Last data filed at 17 7503   Gross per 24 hour   Intake              180 ml   Output              100 ml   Net               80 ml       Physical Exam: pt is wheelchair bound unable to ambulate     Physical Exam   Constitutional: He appears well-developed and well-nourished  No distress  HENT:   Head: Normocephalic and atraumatic  Eyes: Conjunctivae and EOM are normal  Pupils are equal, round, and reactive to light  Right eye exhibits no discharge  Left eye exhibits no discharge  No scleral icterus     Neck: No tracheal deviation present  No thyromegaly present  Cardiovascular: Normal rate  Exam reveals no gallop and no friction rub  No murmur heard  Pulmonary/Chest: No stridor  No respiratory distress  He has no wheezes  He has no rales  He exhibits no tenderness  Very diminished    Abdominal: Soft  Bowel sounds are normal  He exhibits no distension and no mass  There is no tenderness  There is no rebound and no guarding  Musculoskeletal: He exhibits no edema (bl pitting edema with mulitple wounds), tenderness or deformity  Lower extremities with multiple lesions and some contraction and pitting edema bl    Lymphadenopathy:     He has no cervical adenopathy  Neurological: He is alert  2 - 3 talkative    Skin: No rash noted  He is not diaphoretic  No erythema  No pallor  Additional Data:     Labs:      Results from last 7 days  Lab Units 06/26/17  0438 06/25/17  2350   WBC Thousand/uL 3 99* 7 06   HEMOGLOBIN g/dL 9 4* 10 5*   HEMATOCRIT % 28 8* 31 5*   PLATELETS Thousands/uL 126* 157   NEUTROS PCT %  --  76*   LYMPHS PCT %  --  9*   MONOS PCT %  --  14*   EOS PCT %  --  1       Results from last 7 days  Lab Units 06/26/17  0438 06/25/17  2350   SODIUM mmol/L 138 137   POTASSIUM mmol/L 3 7 3 7   CHLORIDE mmol/L 104 101   CO2 mmol/L 25 24   BUN mg/dL 30* 31*   CREATININE mg/dL 2 00* 2 10*   CALCIUM mg/dL 8 6 8 9   TOTAL PROTEIN g/dL  --  7 1   BILIRUBIN TOTAL mg/dL  --  0 52   ALK PHOS U/L  --  85   ALT U/L  --  15   AST U/L  --  14   GLUCOSE RANDOM mg/dL 167* 166*       Results from last 7 days  Lab Units 06/26/17  0044   INR  1 86*       * I Have Reviewed All Lab Data Listed Above  * Additional Pertinent Lab Tests Reviewed:  All Labs Within Last 24 Hours Reviewed    Imaging:    Imaging Reports Reviewed Today Include: reviewed       Recent Cultures (last 7 days):           Last 24 Hours Medication List:     aspirin 81 mg Oral Once per day on Mon Thu   bisacodyl 5 mg Oral BID   ferrous sulfate 325 mg Oral BID fluticasone-salmeterol 1 puff Inhalation Q12H [facility]   guaiFENesin 600 mg Oral Q12H [address]   insulin aspart protamine-insulin aspart 14 Units Subcutaneous Early Morning   insulin aspart protamine-insulin aspart 6 Units Subcutaneous Before Dinner   insulin lispro 1-5 Units Subcutaneous HS   insulin lispro 1-6 Units Subcutaneous TID AC   pantoprazole 40 mg Intravenous Q12H [address]   sotalol 80 mg Oral Daily   warfarin 2 5 mg Oral Once per day on Sun Mon Wed Thu Fri Sat   [START ON 6/27/2017] warfarin 5 mg Oral Once per day on Tue   zolpidem 5 mg Oral HS        Today, Patient Was Seen By: MEREDITH Seals    ** Please Note: This note has been constructed using a voice recognition system   **

## 2017-06-26 NOTE — SOCIAL WORK
CM was informed that pt was admitted from [facility] in [place]  CM called [facility] and spoke to [employer] regarding pts prior level of functioning  Pt receives minimal assistance with bathing; pt is able to transfer indptly; feed and dress himself  Pt is WC bound; nonambulatory  Staff at [facility] administer medications  Melissa Chand informed CM that pt is currently open to [facility]  [facility] referral sent to same  CM added -HHC to pts dc instructions  Per Melissa Chand, pt is able to return when medically stable  Will continue to follow

## 2017-06-26 NOTE — ED ATTENDING ATTESTATION
Juany Bartholomew MD, saw and evaluated the patient  I have discussed the patient with the resident/non-physician practitioner and agree with the resident's/non-physician practitioner's findings, Plan of Care, and MDM as documented in the resident's/non-physician practitioner's note, except where noted  All available labs and Radiology studies were reviewed  At this point I agree with the current assessment done in the Emergency Department  I have conducted an independent evaluation of this patient including a focused history of:    Emergency Department Note- Elba Mullins 80 y o  male MRN: [id number]    Unit/Bed#: [facility] 139-96 Encounter: [id number]    Elba Mullins is a 80 y o  male who presents with   Chief Complaint   Patient presents with    Nausea     persistent nausea and decreased appetite for 3 weeks         History of Present Illness   HPI:  Elba Mullins is a 80 y o  male who presents for evaluation of: For evaluation of an exacerbation of chronic nausea  Patient notes mild midepigastric pain  Both pain and nausea are exacerbated by attempts to eat       Review of Systems    Historical Information   Past Medical History:   Diagnosis Date    Atrial fibrillation     CHF (congestive heart failure)     Chronic kidney disease (CKD), stage III (moderate)     Chronic venous hypertension with ulcer     Right    COPD (chronic obstructive pulmonary disease)     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     HbA1C: 8/24/15: 6 5%    GERD (gastroesophageal reflux disease)     Hypertension     PVD (peripheral vascular disease)      Past Surgical History:   Procedure Laterality Date    CARDIAC PACEMAKER PLACEMENT      HIP FRACTURE SURGERY Right 08/2015     Social History   History   Alcohol Use No     History   Drug Use No     History   Smoking Status    Former Smoker   Smokeless Tobacco    Not on file     Family History: non-contributory    Meds/Allergies   all medications and allergies reviewed  No Known Allergies    Objective   First Vitals:   Blood Pressure: 159/70 (17)  Pulse: 82 (17)  Temperature: (!) 97 3 °F (36 3 °C) (17)  Temp Source: Tympanic (17)  Respirations: 18 (17)  Weight - Scale: 83 5 kg (184 lb) (17)  SpO2: 95 % (17)    Current Vitals:   Blood Pressure: 135/53 (17)  Pulse: 69 (17 0450)  Temperature: 98 5 °F (36 9 °C) (17)  Temp Source: Axillary (17)  Respirations: 18 (17)  Weight - Scale: 83 5 kg (184 lb) (17)  SpO2: 94 % (17 0450)    No intake or output data in the 24 hours ending 17 0606    Invasive Devices     Peripheral Intravenous Line            Peripheral IV 17 Right Forearm 1 day                Physical Exam   Constitutional: He is oriented to person, place, and time  He appears well-developed and well-nourished  HENT:   Head: Normocephalic and atraumatic  Eyes: Conjunctivae are normal  Pupils are equal, round, and reactive to light  Cardiovascular: Normal rate and regular rhythm  Pacemaker noted left upper chest   Pulmonary/Chest: Effort normal and breath sounds normal    Abdominal: Soft  Bowel sounds are normal    Musculoskeletal: Normal range of motion  He exhibits edema (marked lymphedema)  Contractures of both legs- chronic- patient is wheelchair bound; secondary to OA   Neurological: He is alert and oriented to person, place, and time  Psychiatric: He has a normal mood and affect  His behavior is normal  Judgment and thought content normal    Nursing note and vitals reviewed  Medical Decision Makin  Acute exacerbation of nausea and midepigastric pain: CBC r/o anemia; lipase r/o pancreatitis; CMP r/o renal dysfunction;  Comparison of weight 3 weeks ago and weight tonight shows no change  Tn and ECG r/o ACS     Recent Results (from the past 36 hour(s))   CBC and differential    Collection Time: 06/25/17 11:50 PM   Result Value Ref Range    WBC 7 06 4 31 - 10 16 Thousand/uL    RBC 3 47 (L) 3 88 - 5 62 Million/uL    Hemoglobin 10 5 (L) 12 0 - 17 0 g/dL    Hematocrit 31 5 (L) 36 5 - 49 3 %    MCV 91 82 - 98 fL    MCH 30 3 26 8 - 34 3 pg    MCHC 33 3 31 4 - 37 4 g/dL    RDW 15 4 (H) 11 6 - 15 1 %    MPV 9 9 8 9 - 12 7 fL    Platelets 679 556 - 216 Thousands/uL    nRBC 0 /100 WBCs    Neutrophils Relative 76 (H) 43 - 75 %    Lymphocytes Relative 9 (L) 14 - 44 %    Monocytes Relative 14 (H) 4 - 12 %    Eosinophils Relative 1 0 - 6 %    Basophils Relative 0 0 - 1 %    Neutrophils Absolute 5 38 1 85 - 7 62 Thousands/µL    Lymphocytes Absolute 0 62 0 60 - 4 47 Thousands/µL    Monocytes Absolute 0 99 0 17 - 1 22 Thousand/µL    Eosinophils Absolute 0 05 0 00 - 0 61 Thousand/µL    Basophils Absolute 0 00 0 00 - 0 10 Thousands/µL   Comprehensive metabolic panel    Collection Time: 06/25/17 11:50 PM   Result Value Ref Range    Sodium 137 136 - 145 mmol/L    Potassium 3 7 3 5 - 5 3 mmol/L    Chloride 101 100 - 108 mmol/L    CO2 24 21 - 32 mmol/L    Anion Gap 12 4 - 13 mmol/L    BUN 31 (H) 5 - 25 mg/dL    Creatinine 2 10 (H) 0 60 - 1 30 mg/dL    Glucose 166 (H) 65 - 140 mg/dL    Calcium 8 9 8 3 - 10 1 mg/dL    AST 14 5 - 45 U/L    ALT 15 12 - 78 U/L    Alkaline Phosphatase 85 46 - 116 U/L    Total Protein 7 1 6 4 - 8 2 g/dL    Albumin 2 9 (L) 3 5 - 5 0 g/dL    Total Bilirubin 0 52 0 20 - 1 00 mg/dL    eGFR 30 0 ml/min/1 73sq m   Lipase    Collection Time: 06/25/17 11:50 PM   Result Value Ref Range    Lipase 61 (L) 73 - 393 u/L   POCT troponin    Collection Time: 06/25/17 11:52 PM   Result Value Ref Range    POC Troponin I 0 01 0 00 - 0 08 ng/ml    Specimen Type VENOUS    Protime-INR    Collection Time: 06/26/17 12:44 AM   Result Value Ref Range    Protime 21 6 (H) 12 1 - 14 4 seconds    INR 1 86 (H) 0 86 - 1 16     XR chest 2 views   ED Interpretation Left sided pleural effusion       IR consult    (Results Pending)         Portions of the record may have been created with voice recognition software  Occasional wrong word or "sound a like" substitutions may have occurred due to the inherent limitations of voice recognition software

## 2017-06-26 NOTE — PLAN OF CARE
DISCHARGE PLANNING - CARE MANAGEMENT     Discharge to post-acute care or home with appropriate resources Progressing        Nutrition/Hydration-ADULT     Nutrient/Hydration intake appropriate for improving, restoring or maintaining nutritional needs Progressing        Potential for Falls     Patient will remain free of falls Progressing        Prexisting or High Potential for Compromised Skin Integrity     Skin integrity is maintained or improved Progressing

## 2017-06-26 NOTE — H&P
History and Physical - [facility] Internal Medicine    Patient Information: Nestor Ribera 80 y o  male MRN: [id number]  Unit/Bed#: [facility] 000-84 Encounter: [id number]  Admitting Physician: Barrett Ovalle MD  PCP: Cole Selby MD  Date of Admission:  06/26/17    Assessment/Plan    Hospital Problem List:     Principal Problem:    Pleural effusion  Active Problems:    GERD (gastroesophageal reflux disease)    Loss of appetite    Nausea    DM2 (diabetes mellitus, type 2)    Acute tracheobronchitis      Plan for the Primary Problem(s):  · Observation, medical surgical floor  · Pleural effusion  We will refer to IR for drainage and subsequent studies  · Loss of appetite  Patient currently claims that he has nausea looking at food  But we would like to find out whether the nausea may be related to current pleural effusion  Patient does not look to be in any distress  Will also get nutritional consult regards to caloric requirements and intake  We will leave to day team whether the patient may need further workup such as gastroenterology consult for possible scope  Patient placed on Protonix 40 mg IV twice daily  Plan for Additional Problem(s):   · Diabetes mellitus  We'll continue with usual dose of 70/30 at 14 units in the morning and 6 units before dinner  Also, we'll place patient on sliding scale  VTE Prophylaxis: Warfarin (Coumadin)  / sequential compression device   Code Status: Level 3 - DNAR and DNI   POLST: There is no POLST form on file for this patient (pre-hospital)    Anticipated Length of Stay:  Patient will be admitted on an Observation basis with an anticipated length of stay of  less than 2 midnights  Justification for Hospital Stay: Please see detailed plans noted above  Chief Complaint:     Loss of appetite    History of Present Illness:  Nestor Ribera is a 80 y o  male who was admitted recently on May 29 up to June 2, 2017 for a cough   He was recently diagnosed to have tracheobronchitis  Patient was initially on cefepime IV however was discharged on ciprofloxacin  Likewise, he received a short course of steroids and was discharged with prednisone 10 mg daily for a total of 5 days  Instrument time, the patient is claiming that he has no appetite to eat in fact he feels "sick" whenever he looks at food  Otherwise, the patient does not have any fever  He does complain of being sick to the stomach and points at mid epigastric area  Patient likewise claims that every time he takes food there is somewhat increased epigastric discomfort  Looking at his laboratories, patient has hemoglobin of 10 and 5 initially was 12 5  He also has ongoing chronic kidney disease with a creatinine of 2 1 when it is always at 2 02 14 his BUN is at 12  In any case, a chest x-ray that was taken and revealed the presence of left pleural effusion  The patient does not seem to clear in any distress and in fact claims that he can breathe okay  However, the patient still has some nausea  Review of Systems:    Constitutional:  Denies fever or chills   Eyes:  Denies change in visual acuity   HENT:  Denies nasal congestion or sore throat   Respiratory:  Denies cough or shortness of breath   Cardiovascular:  Denies chest pain or edema   GI:  Epigastric discomfort, note of nausea  No note of vomiting or diarrhea  However claims to have decreased appetite due to feeling sick whenever he sees food    :  Denies dysuria   Musculoskeletal:  Denies back pain or joint pain   Integument:  Denies rash   Neurologic:  Denies headache, focal weakness or sensory changes   Endocrine:  Denies polyuria or polydipsia   Lymphatic:  Denies swollen glands   Psychiatric:  Denies depression or anxiety     Past Medical and Surgical History:   Past Medical History:   Diagnosis Date    Atrial fibrillation     CHF (congestive heart failure)     Chronic kidney disease (CKD), stage III (moderate)     Chronic venous hypertension with ulcer     Right    COPD (chronic obstructive pulmonary disease)     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     HbA1C: 8/24/15: 6 5%    GERD (gastroesophageal reflux disease)     Hypertension     PVD (peripheral vascular disease)      Past Surgical History:   Procedure Laterality Date    CARDIAC PACEMAKER PLACEMENT      HIP FRACTURE SURGERY Right 08/2015       Meds/Allergies:  Prescriptions Prior to Admission   Medication    aspirin 81 MG tablet    bisacodyl (DULCOLAX) 5 mg EC tablet    ferrous sulfate 325 (65 Fe) mg tablet    fluticasone furoate-vilanterol (BREO ELLIPTA)    furosemide (LASIX) 40 mg tablet    guaiFENesin (MUCINEX) 600 mg 12 hr tablet    HYDROcodone-acetaminophen (NORCO) 5-325 mg per tablet    insulin aspart protamine-insulin aspart (NovoLOG 70/30) 100 units/mL injection    insulin aspart protamine-insulin aspart (NovoLOG 70/30) 100 units/mL injection    pantoprazole (PROTONIX) 40 mg tablet    propranolol (INDERAL) 20 mg tablet    sotalol (BETAPACE) 80 mg tablet    warfarin (COUMADIN) 2 5 mg tablet    zolpidem (AMBIEN) 5 mg tablet       Allergies: No Known Allergies    Social History:  Marital Status:    Occupation: Currently retired  Patient Pre-hospital Living Situation: [facility]  Patient Pre-hospital Level of Mobility: Ambulatory  Patient Pre-hospital Diet Restrictions: Cardiac diabetic  Substance Use History:   History   Alcohol Use No     History   Smoking Status    Former Smoker   Smokeless Tobacco    Not on file     History   Drug Use No       Family History:  History reviewed  No pertinent family history      Physical Exam:     Vitals:   Blood Pressure: 135/53 (06/26/17 0246)  Pulse: 73 (06/26/17 0246)  Temperature: 98 5 °F (36 9 °C) (06/26/17 0246)  Temp Source: Axillary (06/26/17 0246)  Respirations: 18 (06/26/17 0246)  Weight - Scale: 83 5 kg (184 lb) (06/25/17 2228)  SpO2: 95 % (06/26/17 0246)    Constitutional:  Well developed, well nourished, no acute distress, non-toxic appearance And communicative and able to lie flat in bed but would like to lie on the left side more so because of the left knee pain  (Usual)  Eyes:  PERRL, conjunctiva normal   HENT:  Atraumatic, external ears normal, nose normal, oropharynx dry, no pharyngeal exudates  Neck- normal range of motion, no tenderness, supple   Respiratory:  No respiratory distress, decreased breath sounds on left lower lung field, no rales, no wheezing   Cardiovascular:  Normal rate, normal rhythm, no murmurs, no gallops, no rubs   GI:  Soft, nondistended, normal bowel sounds, nontender, no organomegaly, no mass, no rebound, no guarding   :  No costovertebral angle tenderness   Musculoskeletal:  No edema, no tenderness, no deformities  Back- no tenderness  Integument:  Well hydrated, no rash   Lymphatic:  No lymphadenopathy noted   Neurologic:  Alert and awake and communicative CN 2-12 normal, normal motor function, normal sensory function, no focal deficits noted   Psychiatric:  Speech and behavior appropriate for age      Lab Results: I have personally reviewed pertinent reports  Results from last 7 days  Lab Units 06/25/17  2350   WBC Thousand/uL 7 06   HEMOGLOBIN g/dL 10 5*   HEMATOCRIT % 31 5*   PLATELETS Thousands/uL 157   NEUTROS PCT % 76*   LYMPHS PCT % 9*   MONOS PCT % 14*   EOS PCT % 1       Results from last 7 days  Lab Units 06/25/17  2350   SODIUM mmol/L 137   POTASSIUM mmol/L 3 7   CHLORIDE mmol/L 101   CO2 mmol/L 24   BUN mg/dL 31*   CREATININE mg/dL 2 10*   CALCIUM mg/dL 8 9   TOTAL PROTEIN g/dL 7 1   BILIRUBIN TOTAL mg/dL 0 52   ALK PHOS U/L 85   ALT U/L 15   AST U/L 14   GLUCOSE RANDOM mg/dL 166*       Results from last 7 days  Lab Units 06/26/17  0044   INR  1 86*       Imaging: I have personally reviewed pertinent reports        Xr Chest 2 Views    Result Date: 5/29/2017  Narrative: CHEST INDICATION:  Cough and rhonchi COMPARISON:  3/16/2016 VIEWS:  Frontal and lateral projections IMAGES:  3 FINDINGS:     Dual-lead transvenous pacemaker noted  The heart is top normal size  The aorta is atherosclerotic  There is a hiatal hernia  The lungs are clear  Suggestion of faint pre-existing stable nodularity in the left upper lobe  No pneumothorax or pleural effusion  Visualized osseous structures appear within normal limits for the patient's age  Impression: Hiatal hernia  Pacemaker  No acute findings  Faint stable nodularity in the left upper lobe Workstation performed: [id number]         ** Please Note: Dragon 360 Dictation voice to text software was used in the creation of this document   **

## 2017-06-26 NOTE — PLAN OF CARE
Problem: DISCHARGE PLANNING - CARE MANAGEMENT  Goal: Discharge to post-acute care or home with appropriate resources  INTERVENTIONS:  - Conduct assessment to determine patient/family and health care team treatment goals, and need for post-acute services based on payer coverage, community resources, and patient preferences, and barriers to discharge  - Address psychosocial, clinical, and financial barriers to discharge as identified in assessment in conjunction with the patient/family and health care team  - Arrange appropriate level of post-acute services according to patient's   needs and preference and payer coverage in collaboration with the physician and health care team  - Communicate with and update the patient/family, physician, and health care team regarding progress on the discharge plan  - Arrange appropriate transportation to post-acute venues  - Plan for discharge to [facility] with [facility]    Outcome: Progressing

## 2017-06-26 NOTE — PROCEDURES
Thoracentesis  Date/Time: 6/26/2017 12:48 PM  Performed by: Annabelle Del Rosario by: Mary Salamanca     Patient location: Interventional Radiology  Other Assisting Provider: Yes (comment) (Dr Abbey Benjamin)    Consent:     Consent obtained:  Written    Consent given by:  Patient    Risks discussed:  Incomplete drainage, bleeding, infection, pain and pneumothorax    Alternatives discussed:  Alternative treatment, delayed treatment and observation  Universal protocol:     Procedure explained and questions answered to patient or proxy's satisfaction: yes      Relevant documents present and verified: yes      Test results available and properly labeled: yes      Imaging studies available: yes      Required blood products, implants, devices and special equipment available: yes      Site/side marked: yes      Immediately prior to procedure a time out was called: yes      Patient identity confirmed:  Verbally with patient, arm band and provided demographic data  Indications:     Procedure Purpose: diagnostic      Indications: pleural effusion      Indications comment:  Left  Anesthesia (see MAR for exact dosages): Anesthesia method:  Local infiltration    Local anesthetic:  Lidocaine 1% w/o epi  Procedure details:     Preparation: Patient was prepped and draped in usual sterile fashion      Standard thoracentesis cath kit used: Yes (5 Fr x 7 cm [facility])      Patient position:  Sitting    Laterality:  Unilateral and left    Location:  Midscapular line    Puncture method:  Over-the-needle catheter    Ultrasound guidance: yes      Reason for ultrasound: Identify fluid collection and guide cathetar placement  Images stored locally on hard drive      Indwelling catheter placed: no      Number of attempts:  1    Drainage characteristics:  Serosanguinous (dark)    Fluid removed amount:  525 mL  Post-procedure details:     Patient tolerance of procedure:   Tolerated well, no immediate complications  Comments: Pleural fluid was obtained and sent for multiple laboratory tests as ordered

## 2017-06-26 NOTE — CONSULTS
Pulmonary Consultation   Anila Shrestha 80 y o  male MRN: [id number]  Unit/Bed#: [facility] 781-11 Encounter: [id number]      Reason for consultation: Pleural Effusion    Requesting physician: MEREDITH Guajardo    Impressions:   1  Left pleural effusion s/p IR Tap 6/26/17  2  Hx tobacco use and asbestos exposure    Recommendations:  1  Await pleural fluid studies - further recommendations to follow once these have been reviewed  2  Continue advair as was taking prior to admission  3  Stop IVF  4  Discussed with SLIM CRNP    History of Present Illness   HPI:  Anila Shrestha is a 80 y o  male who was recently admitted from 5/29 through 6/2/17 with a cough  At that time, he was diagnosed with a tracheobronchitis and initially started on cefepime  At his time of discharge, he was transitioned to Cipro  He also received a short course of prednisone - 10mg for 5 days total   At that time, his CXR did not show any abnormalities  Over the past few days, he notes "poor appetite "  He states "I feel sick "  He has minimal appetite, but denies vomiting or trouble eating or drinking  He also states "I would like to eat pie, if they gave me pie I would eat it "  He complains of "indigestion" at times as well  He denies abdominal pain or change in bowel habits  This, not his breathing, is what prompted him to come to the ER  Routine work up in the ER revealed a new left pleural effusion on CXR, which was not present on 5/29 film  He tells me that he has no underlying pulmonary issues except he was a smoker years ago  He also has had asbestos exposure from working at [employer]  "I got myself a  and saw a lung doctor years and years ago and he told me that my lungs were scarred but that they are ok otherwise "  He does take advair at home  He has no PFTs in the system and has never been seen by our group per [employer]    Earlier this morning, he underwent left sided thoracentesis by IR that yielded 525mL of serosanguineous fluid  He feels well post tap and denies any chest pain, resting shortness of breath, fever, cough, sputum production or bronchospasm  He does not walk at all and gets around with an motorized wheelchair  He remains on room air and is requesting either a tuna or roast beef sandwich  Review of systems:  12 point review of systems was completed and was otherwise negative except as listed in HPI  Historical Information   Past Medical History:   Diagnosis Date    Atrial fibrillation     CHF (congestive heart failure)     Chronic kidney disease (CKD), stage III (moderate)     Chronic venous hypertension with ulcer     Right    COPD (chronic obstructive pulmonary disease)     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     HbA1C: 8/24/15: 6 5%    GERD (gastroesophageal reflux disease)     Hypertension     PVD (peripheral vascular disease)      Past Surgical History:   Procedure Laterality Date    CARDIAC PACEMAKER PLACEMENT      HIP FRACTURE SURGERY Right 08/2015     History reviewed  No pertinent family history  Occupational history: Retired   Had exposures to asbestos    Tobacco history: Remote use    Smoked 1 5PPD for "years"  He is unsure when he started and when he stopped    Meds/Allergies   Current Facility-Administered Medications   Medication Dose Route Frequency    acetaminophen (TYLENOL) tablet 650 mg  650 mg Oral Q6H PRN    aluminum-magnesium hydroxide-simethicone (MYLANTA) 200-200-20 mg/5 mL oral suspension 5 mL  5 mL Oral Q6H PRN    aspirin chewable tablet 81 mg  81 mg Oral Once per day on Mon Thu    bisacodyl (DULCOLAX) EC tablet 5 mg  5 mg Oral BID    ferrous sulfate tablet 325 mg  325 mg Oral BID    fluticasone-salmeterol (ADVAIR) 250-50 mcg/dose inhaler 1 puff  1 puff Inhalation Q12H [address]    guaiFENesin (MUCINEX) 12 hr tablet 600 mg  600 mg Oral Q12H DEMIAN    HYDROcodone-acetaminophen (NORCO) 5-325 mg per tablet 1 tablet  1 tablet Oral Q6H PRN    insulin aspart protamine-insulin aspart (NovoLOG 70/30) 100 units/mL subcutaneous injection 14 Units  14 Units Subcutaneous Early Morning    insulin aspart protamine-insulin aspart (NovoLOG 70/30) 100 units/mL subcutaneous injection 6 Units  6 Units Subcutaneous Before Dinner    insulin lispro (HumaLOG) 100 units/mL subcutaneous injection 1-5 Units  1-5 Units Subcutaneous HS    insulin lispro (HumaLOG) 100 units/mL subcutaneous injection 1-6 Units  1-6 Units Subcutaneous TID AC    ondansetron (ZOFRAN) injection 4 mg  4 mg Intravenous Q6H PRN    pantoprazole (PROTONIX) injection 40 mg  40 mg Intravenous Q12H [address]    sotalol (BETAPACE) tablet 80 mg  80 mg Oral Daily    warfarin (COUMADIN) tablet 2 5 mg  2 5 mg Oral Once per day on Sun Mon Wed Thu Fri Sat    [START ON 6/27/2017] warfarin (COUMADIN) tablet 5 mg  5 mg Oral Once per day on Tue    zolpidem (AMBIEN) tablet 5 mg  5 mg Oral HS     Prescriptions Prior to Admission   Medication    aspirin 81 MG tablet    bisacodyl (DULCOLAX) 5 mg EC tablet    ferrous sulfate 325 (65 Fe) mg tablet    fluticasone furoate-vilanterol (BREO ELLIPTA)    furosemide (LASIX) 40 mg tablet    guaiFENesin (MUCINEX) 600 mg 12 hr tablet    HYDROcodone-acetaminophen (NORCO) 5-325 mg per tablet    insulin aspart protamine-insulin aspart (NovoLOG 70/30) 100 units/mL injection    insulin aspart protamine-insulin aspart (NovoLOG 70/30) 100 units/mL injection    pantoprazole (PROTONIX) 40 mg tablet    propranolol (INDERAL) 20 mg tablet    sotalol (BETAPACE) 80 mg tablet    warfarin (COUMADIN) 2 5 mg tablet    zolpidem (AMBIEN) 5 mg tablet     No Known Allergies    Vitals: Blood pressure 138/65, pulse 68, temperature 98 6 °F (37 °C), temperature source Axillary, resp  rate 18, weight 83 5 kg (184 lb), SpO2 97 % , RA at rest, Body mass index is 27 17 kg/m²        Intake/Output Summary (Last 24 hours) at 06/26/17 1140  Last data filed at 06/26/17 0807   Gross per 24 hour   Intake              180 ml   Output              100 ml   Net               80 ml       Physical exam:    General Appearance:    Alert, cooperative, no conversational dyspnea or accessory     muscle use       Head/eyes:    Normocephalic, without obvious abnormality, atraumatic,         PERRL, extraocular muscles intact, no scleral icterus  Poor dentition    Nose:   Nares normal, septum midline, mucosa normal, no drainage    or sinus tenderness   Throat:   Moist mucous membranes, no thrush   Neck:   Supple, trachea midline, no adenopathy; no carotid    bruit or JVD   Lungs:     Fairly clear to auscultation with mildly decreased breath sounds at the bases left greater than right  No wheezes, rhonchi or rales noted  Chest Wall:    No tenderness or deformity    Heart:    Regular rate and rhythm, S1 and S2 normal, no murmur, rub   or gallop   Abdomen:     Soft, non-tender, bowel sounds active all four quadrants,     no masses, no organomegaly   Extremities:   Extremities contracted with +1-2 generalized edema bilaterally   Skin:   Warm, dry, turgor normal, no rashes or lesions  Mild pallor   Lymph nodes:   Cervical and supraclavicular nodes normal   Neurologic:   CNII-XII intact, normal strength in upper extremities only, non-focal         Labs: I have personally reviewed pertinent lab results  , ABG: No results found for: PHART, EUT1JSL, PO2ART, IJQ8SLK, C5IPGQYE, BEART, SOURCE, BNP: No results found for: BNP, CBC:   Lab Results   Component Value Date    WBC 3 99 (L) 06/26/2017    HGB 9 4 (L) 06/26/2017    HCT 28 8 (L) 06/26/2017    MCV 91 06/26/2017     (L) 06/26/2017    MCH 29 7 06/26/2017    MCHC 32 6 06/26/2017    RDW 15 3 (H) 06/26/2017    MPV 10 4 06/26/2017    NRBC 0 06/25/2017   , CMP:   Lab Results   Component Value Date     06/26/2017    K 3 7 06/26/2017     06/26/2017    CO2 25 06/26/2017    ANIONGAP 9 06/26/2017    BUN 30 (H) 06/26/2017 CREATININE 2 00 (H) 06/26/2017    GLUCOSE 167 (H) 06/26/2017    CALCIUM 8 6 06/26/2017    AST 14 06/25/2017    ALT 15 06/25/2017    ALKPHOS 85 06/25/2017    PROT 7 1 06/25/2017    ALBUMIN 2 9 (L) 06/25/2017    BILITOT 0 52 06/25/2017    EGFR 31 7 06/26/2017   , PT/INR:   Lab Results   Component Value Date    INR 1 86 (H) 06/26/2017   , Troponin: No results found for: TROPONINI     Pleural Fluid Analysis - PENDING    Imaging and other studies: I have personally reviewed pertinent films in PACS     PA & Lat CXR 6/26/17  FINDINGS:          Heart enlarged  Permanent pacemaker in place  Pulmonary vessels normal in appearance  Large hiatal hernia present      New opacification in the base of the left hemithorax, primarily a left pleural effusion  Probable atelectasis or pneumonia in the subjacent portion of the left lung base  Questionable 1 cm nodule in the superior segment of the right lower lobe  Lungs   and pleural spaces otherwise clear      Visualized osseous structures appear within normal limits for the patient's age      IMPRESSION:     1   Left pleural effusion, developing since 5/29/2017   2   Probable atelectasis and/or pneumonia in the base of the left lower lobe  3   Questionable 1 cm nodule in the right lower lobe  Pulmonary function testing: No PFTs on file    EKG, Pathology, and Other Studies: I have personally reviewed pertinent reports  2Decho 5/31/17  SUMMARY     LEFT VENTRICLE:  Systolic function was normal  Ejection fraction was estimated to be 65 %  There was akinesis of the basal inferior and basal inferolateral wall(s)  Wall thickness was mildly increased  There was mild concentric hypertrophy    Features were consistent with a pseudonormal left ventricular filling pattern, with concomitant abnormal relaxation and increased filling pressure (grade 2 diastolic dysfunction)      RIGHT VENTRICLE:  The size was normal   Systolic function was normal      LEFT ATRIUM:  The atrium was moderately to markedly dilated      MITRAL VALVE:  There was trace regurgitation      TRICUSPID VALVE:  There was mild regurgitation  Regurgitation grade was 1+ on a scale of 0 to 4+  Pulmonary artery systolic pressure was moderately increased  Estimated peak PA pressure was 57 mmHg  The findings suggest moderate to severe pulmonary hypertension      PULMONIC VALVE:  There was trace regurgitation      HISTORY: PRIOR HISTORY: Congestive heart failure, Cardiomyopathy, Pacemaker, Hypertension, Diabetes, Peripheral vascular disease, Chronic kidney disease, Cirrhosis, COPD      PROCEDURE: The procedure was performed at the bedside  This was a routine study  The transthoracic approach was used  The study included complete 2D imaging, M-mode, complete spectral Doppler, and color Doppler  The heart rate was 64 bpm,  at the start of the study  Images were obtained from the parasternal, apical, subcostal, and suprasternal notch acoustic windows  Image quality was adequate      LEFT VENTRICLE: Size was normal  Systolic function was normal  Ejection fraction was estimated to be 65 %  There was akinesis of the basal inferior and basal inferolateral wall(s)  Wall thickness was mildly increased  There was mild  concentric hypertrophy  DOPPLER: Features were consistent with a pseudonormal left ventricular filling pattern, with concomitant abnormal relaxation and increased filling pressure (grade 2 diastolic dysfunction)      RIGHT VENTRICLE: The size was normal  Systolic function was normal  Wall thickness was normal  A pacing wire was present      LEFT ATRIUM: The atrium was moderately to markedly dilated      RIGHT ATRIUM: Size was normal  A pacing wire was present      MITRAL VALVE: Valve structure was normal  There was mild diffuse thickening of the anterior and posterior leaflets  There was normal leaflet separation  DOPPLER: The transmitral velocity was within the normal range  There was no evidence  for stenosis   There was trace regurgitation      AORTIC VALVE: The valve was trileaflet  Leaflets exhibited normal thickness and normal cuspal separation  DOPPLER: Transaortic velocity was within the normal range  There was no evidence for stenosis  There was no regurgitation      TRICUSPID VALVE: The valve structure was normal  There was normal leaflet separation  DOPPLER: The transtricuspid velocity was within the normal range  There was no evidence for stenosis  There was mild regurgitation  Regurgitation grade  was 1+ on a scale of 0 to 4+  Pulmonary artery systolic pressure was moderately increased  Estimated peak PA pressure was 57 mmHg  The findings suggest moderate to severe pulmonary hypertension      PULMONIC VALVE: Leaflets exhibited normal thickness, no calcification, and normal cuspal separation  DOPPLER: The transpulmonic velocity was within the normal range  There was trace regurgitation      PERICARDIUM: There was no pericardial effusion  The pericardium was normal in appearance      AORTA: The root exhibited normal size      SYSTEMIC VEINS: IVC: The inferior vena cava was normal in size   Respirophasic changes were normal      SYSTEM MEASUREMENT TABLES     2D  %FS: 26 36 %  Ao Diam: 2 47 cm  EDV(Teich): 66 98 ml  EF(Cube): 60 07 %  EF(Teich): 52 26 %  ESV(Cube): 24 17 ml  ESV(Teich): 31 97 ml  IVSd: 0 96 cm  LA Area: 31 85 cm2  LA Diam: 2 64 cm  LVEDV MOD A4C: 102 83 ml  LVEF MOD A4C: 67 65 %  LVESV MOD A4C: 33 27 ml  LVIDd: 3 93 cm  LVIDs: 2 89 cm  LVLd A4C: 7 78 cm  LVLs A4C: 6 21 cm  LVPWd: 1 05 cm  RA Area: 14 74 cm2  SV MOD A4C: 69 56 ml  SV(Cube): 36 37 ml  SV(Teich): 35 ml  rv diam: 3 51 cm     CW  TR Vmax: 3 68 m/s  TR maxP 11 mmHg     MM  TAPSE: 1 89 cm     PW  E': 0 05 m/s  E/E': 18 59  MV A Anshul: 0 74 m/s  MV Dec [place]: 3 54 m/s2  MV DecT: 239 31 ms  MV E Anshul: 0 85 m/s  MV E/A Ratio: 1 14     [employer] Accredited [employer] [facility]     Prepared and electronically signed by     Quoc Miller MD    Code Status: Level 3 - DNAR and DNI      Pablo Anne PA-C

## 2017-06-27 PROBLEM — J20.9 ACUTE TRACHEOBRONCHITIS: Status: RESOLVED | Noted: 2017-06-02 | Resolved: 2017-06-27

## 2017-06-27 LAB
APTT PPP: 38 SECONDS (ref 23–35)
GLUCOSE SERPL-MCNC: 159 MG/DL (ref 65–140)
GLUCOSE SERPL-MCNC: 203 MG/DL (ref 65–140)
GLUCOSE SERPL-MCNC: 214 MG/DL (ref 65–140)
GLUCOSE SERPL-MCNC: 270 MG/DL (ref 65–140)
GLUCOSE SERPL-MCNC: 299 MG/DL (ref 65–140)
INR PPP: 1.58 (ref 0.86–1.16)
PROTHROMBIN TIME: 19 SECONDS (ref 12.1–14.4)

## 2017-06-27 PROCEDURE — 85730 THROMBOPLASTIN TIME PARTIAL: CPT | Performed by: INTERNAL MEDICINE

## 2017-06-27 PROCEDURE — 82948 REAGENT STRIP/BLOOD GLUCOSE: CPT

## 2017-06-27 PROCEDURE — 85610 PROTHROMBIN TIME: CPT | Performed by: INTERNAL MEDICINE

## 2017-06-27 PROCEDURE — C9113 INJ PANTOPRAZOLE SODIUM, VIA: HCPCS | Performed by: INTERNAL MEDICINE

## 2017-06-27 RX ORDER — SUCRALFATE ORAL 1 G/10ML
1000 SUSPENSION ORAL EVERY 6 HOURS SCHEDULED
Status: DISCONTINUED | OUTPATIENT
Start: 2017-06-27 | End: 2017-07-08 | Stop reason: HOSPADM

## 2017-06-27 RX ORDER — HEPARIN SODIUM 5000 [USP'U]/ML
5000 INJECTION, SOLUTION INTRAVENOUS; SUBCUTANEOUS EVERY 8 HOURS SCHEDULED
Status: DISCONTINUED | OUTPATIENT
Start: 2017-06-27 | End: 2017-07-01

## 2017-06-27 RX ADMIN — INSULIN LISPRO 4 UNITS: 100 INJECTION, SOLUTION INTRAVENOUS; SUBCUTANEOUS at 17:08

## 2017-06-27 RX ADMIN — SOTALOL HYDROCHLORIDE 80 MG: 80 TABLET ORAL at 09:01

## 2017-06-27 RX ADMIN — BISACODYL 5 MG: 5 TABLET, COATED ORAL at 17:08

## 2017-06-27 RX ADMIN — INSULIN ASPART 6 UNITS: 100 INJECTION, SUSPENSION SUBCUTANEOUS at 17:15

## 2017-06-27 RX ADMIN — PANTOPRAZOLE SODIUM 40 MG: 40 INJECTION, POWDER, FOR SOLUTION INTRAVENOUS at 22:30

## 2017-06-27 RX ADMIN — Medication 325 MG: at 08:59

## 2017-06-27 RX ADMIN — SUCRALFATE 1000 MG: 1 SUSPENSION ORAL at 17:08

## 2017-06-27 RX ADMIN — GUAIFENESIN 600 MG: 600 TABLET, EXTENDED RELEASE ORAL at 08:59

## 2017-06-27 RX ADMIN — HEPARIN SODIUM 5000 UNITS: 5000 INJECTION, SOLUTION INTRAVENOUS; SUBCUTANEOUS at 22:35

## 2017-06-27 RX ADMIN — BISACODYL 5 MG: 5 TABLET, COATED ORAL at 08:59

## 2017-06-27 RX ADMIN — ZOLPIDEM TARTRATE 5 MG: 5 TABLET, FILM COATED ORAL at 22:30

## 2017-06-27 RX ADMIN — FLUTICASONE PROPIONATE AND SALMETEROL 1 PUFF: 50; 250 POWDER RESPIRATORY (INHALATION) at 22:36

## 2017-06-27 RX ADMIN — Medication 325 MG: at 17:08

## 2017-06-27 RX ADMIN — INSULIN ASPART 14 UNITS: 100 INJECTION, SUSPENSION SUBCUTANEOUS at 07:36

## 2017-06-27 RX ADMIN — HYDROCODONE BITARTRATE AND ACETAMINOPHEN 1 TABLET: 5; 325 TABLET ORAL at 22:35

## 2017-06-27 RX ADMIN — INSULIN LISPRO 2 UNITS: 100 INJECTION, SOLUTION INTRAVENOUS; SUBCUTANEOUS at 12:10

## 2017-06-27 RX ADMIN — INSULIN LISPRO 1 UNITS: 100 INJECTION, SOLUTION INTRAVENOUS; SUBCUTANEOUS at 09:02

## 2017-06-27 RX ADMIN — GUAIFENESIN 600 MG: 600 TABLET, EXTENDED RELEASE ORAL at 22:30

## 2017-06-27 RX ADMIN — SUCRALFATE 1000 MG: 1 SUSPENSION ORAL at 12:10

## 2017-06-27 RX ADMIN — INSULIN LISPRO 2 UNITS: 100 INJECTION, SOLUTION INTRAVENOUS; SUBCUTANEOUS at 22:39

## 2017-06-27 RX ADMIN — HEPARIN SODIUM 5000 UNITS: 5000 INJECTION, SOLUTION INTRAVENOUS; SUBCUTANEOUS at 14:21

## 2017-06-27 RX ADMIN — FLUTICASONE PROPIONATE AND SALMETEROL 1 PUFF: 50; 250 POWDER RESPIRATORY (INHALATION) at 09:01

## 2017-06-27 RX ADMIN — PANTOPRAZOLE SODIUM 40 MG: 40 INJECTION, POWDER, FOR SOLUTION INTRAVENOUS at 08:58

## 2017-06-27 NOTE — ASSESSMENT & PLAN NOTE
Plan:   · Rate controlled on sotalol  · INR subtherapeutic at 1 58  Continue Coumadin 5 mg tonight and check INR in the morning  Add subcutaneous heparin until INR is therapeutic

## 2017-06-27 NOTE — CONSULTS
Consultation - 126 [facility]  Maximilian Nolan 80 y o  male MRN: [id number]  Unit/Bed#: [facility] 713-01 Encounter: [id number]        Consults  ASSESSMENT/ PLAN:   79 yo male admitted with pleural effusion being evaluated for persistent nausea and decreased appetite  1  Persistent Nausea:admits to about 3 week hx of abdominal discomfort and nausea  He admits that some foods he can eat and others he cannot  He admits to dry heaving without any episodes of vomiting  He has had increased gas and burping without heartburn  These symptoms started about the same time he was dx with bronchitis on last admission 3 weeks ago but never improved  -continue PPI and carafate  -continue supportive care per primary team  -will consider EGD tomorrow to assess for source of nausea  -if EGD is negative would consider MRI or CT brain  -will need to hold evening dose of warfarin and check INR in the morning    Reason for Consult / Principal Problem: persistent nausea    HPI: Carlos Alberto Martell is an 79 yo male PMH a-fib on warfarin, CHF, CKD III, COPD, DM, GERD, HTN, PVD who was admitted with a pleural effusion being evaluated by GI for abdominal discomfort and persistent nausea  He states that he has had nausea and decreased appetite for the past 3 weeks  He denies abdominal pain but does admit to abdominal discomfort, increased gas and burping  He denies any other GI symptoms including dysphagia, vomiting, diarrhea, melena or hematochezia  He does admit to constipation since admission  He has had EGD and colonoscopy in the past by Dr Bob Martell but cannot remember why or the results, thinks they were >10 years ago  He states he cannot remember if he smoked or drank alcohol in the past  Denies family hx of colon cancer  He had thoracentesis this admission with 525 cc removed and the final culture is pending  CT chest revealed RLL mass  REVIEW OF SYSTEMS:     CONSTITUTIONAL: Denies any fever, chills, or rigors   Good appetite, and no recent weight loss  HEENT: No earache or tinnitus  Denies hearing loss or visual disturbances  CARDIOVASCULAR: No chest pain or palpitations  RESPIRATORY: Denies any cough, hemoptysis, shortness of breath or dyspnea on exertion  GASTROINTESTINAL: As noted in the History of Present Illness  GENITOURINARY: No problems with urination  Denies any hematuria or dysuria  NEUROLOGIC: No dizziness or vertigo, denies headaches  MUSCULOSKELETAL: Denies any muscle or joint pain  SKIN: Denies skin rashes or itching  ENDOCRINE: Denies excessive thirst  Denies intolerance to heat or cold  PSYCHOSOCIAL: Denies depression or anxiety  Denies any recent memory loss  Historical Information   Past Medical History:   Diagnosis Date    Atrial fibrillation     CHF (congestive heart failure)     Chronic kidney disease (CKD), stage III (moderate)     Chronic venous hypertension with ulcer     Right    COPD (chronic obstructive pulmonary disease)     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     HbA1C: 8/24/15: 6 5%    GERD (gastroesophageal reflux disease)     Hypertension     PVD (peripheral vascular disease)      Past Surgical History:   Procedure Laterality Date    CARDIAC PACEMAKER PLACEMENT      HIP FRACTURE SURGERY Right 08/2015     Social History   History   Alcohol Use No     History   Drug Use No     History   Smoking Status    Former Smoker   Smokeless Tobacco    Not on file     History reviewed  No pertinent family history      Meds/Allergies     Prescriptions Prior to Admission   Medication    aspirin 81 MG tablet    bisacodyl (DULCOLAX) 5 mg EC tablet    ferrous sulfate 325 (65 Fe) mg tablet    fluticasone furoate-vilanterol (BREO ELLIPTA)    furosemide (LASIX) 40 mg tablet    guaiFENesin (MUCINEX) 600 mg 12 hr tablet    HYDROcodone-acetaminophen (NORCO) 5-325 mg per tablet    insulin aspart protamine-insulin aspart (NovoLOG 70/30) 100 units/mL injection    insulin aspart protamine-insulin aspart (NovoLOG 70/30) 100 units/mL injection    pantoprazole (PROTONIX) 40 mg tablet    propranolol (INDERAL) 20 mg tablet    sotalol (BETAPACE) 80 mg tablet    warfarin (COUMADIN) 2 5 mg tablet    zolpidem (AMBIEN) 5 mg tablet     Current Facility-Administered Medications   Medication Dose Route Frequency    acetaminophen (TYLENOL) tablet 650 mg  650 mg Oral Q6H PRN    aluminum-magnesium hydroxide-simethicone (MYLANTA) 200-200-20 mg/5 mL oral suspension 5 mL  5 mL Oral Q6H PRN    aspirin chewable tablet 81 mg  81 mg Oral Once per day on Mon Thu    bisacodyl (DULCOLAX) EC tablet 5 mg  5 mg Oral BID    ferrous sulfate tablet 325 mg  325 mg Oral BID    fluticasone-salmeterol (ADVAIR) 250-50 mcg/dose inhaler 1 puff  1 puff Inhalation Q12H [address]    guaiFENesin (MUCINEX) 12 hr tablet 600 mg  600 mg Oral Q12H [facility]    heparin (porcine) subcutaneous injection 5,000 Units  5,000 Units Subcutaneous Q8H [address]    HYDROcodone-acetaminophen (NORCO) 5-325 mg per tablet 1 tablet  1 tablet Oral Q6H PRN    insulin aspart protamine-insulin aspart (NovoLOG 70/30) 100 units/mL subcutaneous injection 14 Units  14 Units Subcutaneous Early Morning    insulin aspart protamine-insulin aspart (NovoLOG 70/30) 100 units/mL subcutaneous injection 6 Units  6 Units Subcutaneous Before Dinner    insulin lispro (HumaLOG) 100 units/mL subcutaneous injection 1-5 Units  1-5 Units Subcutaneous HS    insulin lispro (HumaLOG) 100 units/mL subcutaneous injection 1-6 Units  1-6 Units Subcutaneous TID AC    ondansetron (ZOFRAN) injection 4 mg  4 mg Intravenous Q6H PRN    pantoprazole (PROTONIX) injection 40 mg  40 mg Intravenous Q12H [facility]    sotalol (BETAPACE) tablet 80 mg  80 mg Oral Daily    sucralfate (CARAFATE) oral suspension 1,000 mg  1,000 mg Oral Q6H [address]    warfarin (COUMADIN) tablet 2 5 mg  2 5 mg Oral Once per day on Sun Mon Wed Thu Fri Sat    warfarin (COUMADIN) tablet 5 mg 5 mg Oral Once per day on Tue    zolpidem (AMBIEN) tablet 5 mg  5 mg Oral HS       No Known Allergies        Objective     Blood pressure 98/57, pulse 64, temperature 98 7 °F (37 1 °C), temperature source Oral, resp  rate 18, height 5' 9" (1 753 m), weight 80 2 kg (176 lb 12 9 oz), SpO2 96 %  Intake/Output Summary (Last 24 hours) at 06/27/17 1350  Last data filed at 06/27/17 1239   Gross per 24 hour   Intake              740 ml   Output             1400 ml   Net             -660 ml         PHYSICAL EXAM:      General Appearance:   Alert, cooperative, no distress, appears stated age    HEENT:   Normocephalic, atraumatic, anicteric      Neck:  Supple, symmetrical, trachea midline    Lungs:   Clear to auscultation bilaterally; no rales, rhonchi or wheezing; respirations unlabored    Heart[de-identified]   S1 and S2 normal; regular rate and rhythm; no murmur, rub, or gallop     Abdomen:   Soft, non-tender, non-distended; normal bowel sounds; no masses, no organomegaly    Genitalia:   Deferred    Rectal:   Deferred    Extremities:  No cyanosis, clubbing or edema    Pulses:  2+ and symmetric all extremities    Skin:  Skin color, texture, turgor normal, no rashes or lesions          Lab Results:   Admission on 06/25/2017   Component Date Value    WBC 06/26/2017 7 06     RBC 06/26/2017 3 47*    Hemoglobin 06/26/2017 10 5*    Hematocrit 06/26/2017 31 5*    MCV 06/26/2017 91     MCH 06/26/2017 30 3     MCHC 06/26/2017 33 3     RDW 06/26/2017 15 4*    MPV 06/26/2017 9 9     Platelets 06/49/5426 157     nRBC 06/26/2017 0     Neutrophils Relative 06/26/2017 76*    Lymphocytes Relative 06/26/2017 9*    Monocytes Relative 06/26/2017 14*    Eosinophils Relative 06/26/2017 1     Basophils Relative 06/26/2017 0     Neutrophils Absolute 06/26/2017 5 38     Lymphocytes Absolute 06/26/2017 0 62     Monocytes Absolute 06/26/2017 0 99     Eosinophils Absolute 06/26/2017 0 05     Basophils Absolute 06/26/2017 0 00     Sodium 06/26/2017 137     Potassium 06/26/2017 3 7     Chloride 06/26/2017 101     CO2 06/26/2017 24     Anion Gap 06/26/2017 12     BUN 06/26/2017 31*    Creatinine 06/26/2017 2 10*    Glucose 06/26/2017 166*    Calcium 06/26/2017 8 9     AST 06/26/2017 14     ALT 06/26/2017 15     Alkaline Phosphatase 06/26/2017 85     Total Protein 06/26/2017 7 1     Albumin 06/26/2017 2 9*    Total Bilirubin 06/26/2017 0 52     eGFR 06/26/2017 30 0     Lipase 06/26/2017 61*    Ventricular Rate 06/26/2017 75     Atrial Rate 06/26/2017 75     RI Interval 06/26/2017 224     QRSD Interval 06/26/2017 82     QT Interval 06/26/2017 396     QTC Interval 06/26/2017 442     P Axis 06/26/2017 67     QRS Axis 06/26/2017 5     T Wave Axis 06/26/2017 49     Protime 06/26/2017 21 6*    INR 06/26/2017 1 86*    POC Troponin I 06/26/2017 0 01     Specimen Type 06/26/2017 VENOUS     Hemoglobin A1C 06/26/2017 7 9*    EAG 06/26/2017 180     Sodium 06/26/2017 138     Potassium 06/26/2017 3 7     Chloride 06/26/2017 104     CO2 06/26/2017 25     Anion Gap 06/26/2017 9     BUN 06/26/2017 30*    Creatinine 06/26/2017 2 00*    Glucose 06/26/2017 167*    Glucose, Fasting 06/26/2017 167*    Calcium 06/26/2017 8 6     eGFR 06/26/2017 31 7     Magnesium 06/26/2017 1 8     Phosphorus 06/26/2017 2 4     WBC 06/26/2017 3 99*    RBC 06/26/2017 3 16*    Hemoglobin 06/26/2017 9 4*    Hematocrit 06/26/2017 28 8*    MCV 06/26/2017 91     MCH 06/26/2017 29 7     MCHC 06/26/2017 32 6     RDW 06/26/2017 15 3*    Platelets 58/67/4012 126*    MPV 06/26/2017 10 4     Site 06/26/2017 Left Pleural      WBC, Fluid 06/26/2017 1118     Body Fluid Culture, Ster* 06/27/2017 No growth     Gram Stain Result 06/27/2017 Rare Polys     Gram Stain Result 06/27/2017 No bacteria seen     LD, Fluid 06/26/2017 319     Protein, Fluid 06/26/2017 3 9     PH BODY FLUID 06/26/2017 7 4     Glucose, Fluid 06/26/2017 190     LD 06/26/2017 321*    POC Glucose 06/26/2017 186*    Total Counted 06/26/2017 100     Neutrophils % (Fluid) 06/26/2017 6     Lymphs % (Fluid) 06/26/2017 83     Eosinophils % (Fluid) 06/26/2017 2     Monocytes % (Fluid) 06/26/2017 9     POC Glucose 06/26/2017 176*    Protime 06/27/2017 19 0*    INR 06/27/2017 1 58*    PTT 06/27/2017 38*    POC Glucose 06/26/2017 317*    POC Glucose 06/27/2017 159*    POC Glucose 06/27/2017 203*    POC Glucose 06/27/2017 214*       Imaging Studies: I have personally reviewed pertinent imaging studies  Xr Chest 2 Views  Result Date: 6/26/2017  Impression: 1  Left pleural effusion, developing since 5/29/2017  2   Probable atelectasis and/or pneumonia in the base of the left lower lobe  3   Questionable 1 cm nodule in the right lower lobe  Ir Thoracentesis  Result Date: 6/26/2017  Impression: Impression: Successful therapeutic/diagnostic ultrasound-guided thoracentesis

## 2017-06-27 NOTE — ASSESSMENT & PLAN NOTE
Assessment: Exudative     Plan:   · Status post thoracentesis for 525 cc of serosanguineous fluid  Fluid analysis is pending  · Pulmonary is following  · CT chest results are pending  · To consider underlying malignancy

## 2017-06-27 NOTE — ASSESSMENT & PLAN NOTE
Plan:   · Hemoglobin A1c 7 9  · Blood sugars: 317, 176, 186  · Continue current insulin regimen including 70/30 14 units at breakfast and 6 units at dinner  · Continue sliding scale insulin and Accu-Cheks

## 2017-06-27 NOTE — PROGRESS NOTES
Progress Note - Pulmonary   Tiny Fury 80 y o  male MRN: [id number]  [facility] Encounter: [id number]      Assessment:  1  Left, exudative pleural effusion s/p IR tap 6/26/17  2  Dysphagia  3  Abnormal CT Chest with RLL mass  4  Hx tobacco use and asbestos exposure    Plan:  1  Await final pleural fluid studies and cytology  2  Consider GI evaluation for persistent "indigestion"  3  Continue advair  4  Would pursue GI evaluation first with probable EGD  Would then ask IR to biopsy lung mass  Subjective:   Mr Rc Llanos is seen laying in bed this morning  He denies any pulmonary symptoms but continues to complain of persistent "indigestion "  He states "it kept me up all night "  He denies any specific dysphagia to solids or liquids  He has had decreased appetite and with the things he does eat, he often feels nauseous as though he could vomit, although he never does  He denies chest pain, resting shortness of breath, bronchospasm, fevers or cough  Objective:     Vitals: Blood pressure (!) 127/49, pulse 68, temperature 98 1 °F (36 7 °C), temperature source Oral, resp  rate 18, height 5' 9" (1 753 m), weight 80 2 kg (176 lb 12 9 oz), SpO2 94 % , RA rest, Body mass index is 26 11 kg/m²        Intake/Output Summary (Last 24 hours) at 06/27/17 0906  Last data filed at 06/26/17 2100   Gross per 24 hour   Intake              240 ml   Output              900 ml   Net             -660 ml         Physical Exam  Gen: Awake, alert, oriented x 3, no acute distress  HEENT: Mucous membranes moist, no oral lesions, no thrush  NECK: No accessory muscle use, JVP not elevated  Cardiac: Regular, single S1, single S2, no murmurs, no rubs, no gallops  Lungs: Decreased breath sounds bilaterally without wheezes, rhonchi or rales bilaterally  Abdomen: normoactive bowel sounds, soft nontender, nondistended, no rebound or rigidity, no guarding  Extremities: no cyanosis, no clubbing   +1 generalized edema of the lower extremities  LE are contracted    Labs: I have personally reviewed pertinent lab results  , ABG: No results found for: PHART, NIW4FXU, PO2ART, FKJ1DWO, C0ELIFOL, BEART, SOURCE, BNP: No results found for: BNP, CBC: No results found for: WBC, HGB, HCT, MCV, PLT, ADJUSTEDWBC, MCH, MCHC, RDW, MPV, NRBC, CMP: No results found for: NA, K, CL, CO2, ANIONGAP, BUN, CREATININE, GLUCOSE, CALCIUM, AST, ALT, ALKPHOS, PROT, ALBUMIN, BILITOT, EGFR, PT/INR:   Lab Results   Component Value Date    INR 1 58 (H) 06/27/2017   , Troponin: No results found for: TROPONINI     Pleural Fluid Studies  LDH - 319  TP - 3 9  Glucose - 190  PH - 7 4  WBC - 1118, Diff - PENDING  Gram stain & Culture - Rare polys with no bacteria seen  Cytology - PENDING  AFB - PENDING    Imaging and other studies: I have personally reviewed pertinent films in PACS    CTChest 6/26/17  By my read shows smooth boarderd lung mass in the RLL  No calcification noted  Esophagus appears thickened along with thickened gastric lining  Will await official read      Sofi Dumont PA-C

## 2017-06-27 NOTE — SUBJECTIVE & OBJECTIVE
VTE Pharmacologic Prophylaxis:   Pharmacologic: Will add heparin SC until INR is therapeutic  Mechanical: Mechanical VTE prophylaxis in place  Patient Centered Rounds: I have performed bedside rounds with nursing staff today  Discussions with Specialists or Other Care Team Provider: Patient's PCP, Dr Juanito Landa  Education and Discussions with Family / Patient: All patient questions answered to the best of my ability  Time Spent for Care: 20 minutes  More than 50% of total time spent on counseling and coordination of care as described above  Current Length of Stay: 0 day(s)  Current Patient Status: Observation   Certification Statement: Maintain observation status for now  Will obtain GI consult for persistent nausea  Discharge Plan: Patient is not yet medically stable for discharge  He resides at [facility] and will return there once stable  Code Status: Level 3 - DNAR and DNI    Subjective:   Patient states he is having persistent nausea which started during his last hospitalization when he was diagnosed with tracheobronchitis  Since that time, it has been worsening and keeps him awake at night  He denies any burning chest discomfort  He denies any acid brash  He has occasional belching  He's had several endoscopies in the past by Dr Grey Timmons but is unaware of the results  He denies any shortness of breath or pleuritic chest pain  He denies any abdominal pain  He denies any vomiting  Objective:   Vitals:   Temp (24hrs), Av 3 °F (36 8 °C), Min:97 7 °F (36 5 °C), Max:99 2 °F (37 3 °C)    HR:  [56-75] 68  Resp:  [16-20] 18  BP: ()/(49-73) 127/49  SpO2:  [94 %-100 %] 94 %  Body mass index is 26 11 kg/m²  Input and Output Summary (last 24 hours):        Intake/Output Summary (Last 24 hours) at 17 1002  Last data filed at 17 0900   Gross per 24 hour   Intake              440 ml   Output              900 ml   Net             -460 ml       Physical Exam:     Physical Exam Constitutional: He is oriented to person, place, and time  He appears well-developed and well-nourished  HENT:   Head: Normocephalic and atraumatic  Mouth/Throat: Oropharynx is clear and moist and mucous membranes are normal    Eyes: No scleral icterus  Cardiovascular: Normal rate and regular rhythm  No murmur heard  Pulmonary/Chest: Effort normal and breath sounds normal  No respiratory distress  He has no wheezes  He has no rales  He exhibits no tenderness  Abdominal: Soft  Bowel sounds are normal  He exhibits no distension  There is no tenderness  Musculoskeletal: Normal range of motion  He exhibits edema (Bilateral lower extremities  )  Neurological: He is alert and oriented to person, place, and time  Skin: Skin is warm and dry  No rash noted  Psychiatric: He has a normal mood and affect  Vitals reviewed  Additional Data:   Labs:    Results from last 7 days  Lab Units 06/26/17  0438 06/25/17  2350   WBC Thousand/uL 3 99* 7 06   HEMOGLOBIN g/dL 9 4* 10 5*   HEMATOCRIT % 28 8* 31 5*   PLATELETS Thousands/uL 126* 157   NEUTROS PCT %  --  76*   LYMPHS PCT %  --  9*   MONOS PCT %  --  14*   EOS PCT %  --  1       Results from last 7 days  Lab Units 06/26/17  0438 06/25/17  2350   SODIUM mmol/L 138 137   POTASSIUM mmol/L 3 7 3 7   CHLORIDE mmol/L 104 101   CO2 mmol/L 25 24   BUN mg/dL 30* 31*   CREATININE mg/dL 2 00* 2 10*   CALCIUM mg/dL 8 6 8 9   TOTAL PROTEIN g/dL  --  7 1   BILIRUBIN TOTAL mg/dL  --  0 52   ALK PHOS U/L  --  85   ALT U/L  --  15   AST U/L  --  14   GLUCOSE RANDOM mg/dL 167* 166*       Results from last 7 days  Lab Units 06/27/17  0223   INR  1 58*     * I Have Reviewed All Lab Data Listed Above  * Additional Pertinent Lab Tests Reviewed: All Labs Within Last 24 Hours Reviewed    Imaging:    Imaging Reports Reviewed Today Include: CT chest pending    Cultures:   Blood Culture:   Lab Results   Component Value Date    BLOODCX No Growth After 5 Days   03/16/2016    BLOODCX No Growth After 5 Days  03/16/2016     Urine Culture: No results found for: URINECX  Sputum Culture: No components found for: SPUTUMCX  Wound Culture: No results found for: WOUNDCULT    Last 24 Hours Medication List:     aspirin 81 mg Oral Once per day on Mon Thu   bisacodyl 5 mg Oral BID   ferrous sulfate 325 mg Oral BID   fluticasone-salmeterol 1 puff Inhalation Q12H [address]   guaiFENesin 600 mg Oral Q12H [address]   insulin aspart protamine-insulin aspart 14 Units Subcutaneous Early Morning   insulin aspart protamine-insulin aspart 6 Units Subcutaneous Before Dinner   insulin lispro 1-5 Units Subcutaneous HS   insulin lispro 1-6 Units Subcutaneous TID AC   pantoprazole 40 mg Intravenous Q12H [address]   sotalol 80 mg Oral Daily   warfarin 2 5 mg Oral Once per day on Sun Mon Wed Thu Fri Sat   warfarin 5 mg Oral Once per day on Tue   zolpidem 5 mg Oral HS      Today, Patient Was Seen By: Swathi Holland PA-C    ** Please Note: Dragon 360 Dictation voice to text software may have been used in the creation of this document   **

## 2017-06-27 NOTE — PROGRESS NOTES
Progress Note - Kellie Nagy 80 y o  male MRN: [id number]  Unit/Bed#: [facility] 338-58 Encounter: [id number]    * Nausea   Assessment & Plan     Plan:   · GI consult  A-fib   Assessment & Plan     Plan:   · Rate controlled on sotalol  · INR subtherapeutic at 1 58  Continue Coumadin 5 mg tonight and check INR in the morning  Add subcutaneous heparin until INR is therapeutic  Pleural effusion   Assessment & Plan     Assessment: Exudative     Plan:   · Status post thoracentesis for 525 cc of serosanguineous fluid  Fluid analysis is pending  · Pulmonary is following  · CT chest results are pending  · To consider underlying malignancy  Loss of appetite   Assessment & Plan     Plan:   · GI consult        GERD (gastroesophageal reflux disease)   Assessment & Plan     Plan:   · Continue Protonix b i d   · Consult GI for ongoing nausea  · Add Carafate  DM2 (diabetes mellitus, type 2)   Assessment & Plan     Plan:   · Hemoglobin A1c 7 9  · Blood sugars: 317, 176, 186  · Continue current insulin regimen including 70/30 14 units at breakfast and 6 units at dinner  · Continue sliding scale insulin and Accu-Cheks  VTE Pharmacologic Prophylaxis:   Pharmacologic: Will add heparin SC until INR is therapeutic  Mechanical: Mechanical VTE prophylaxis in place  Patient Centered Rounds: I have performed bedside rounds with nursing staff today  Discussions with Specialists or Other Care Team Provider: Patient's PCP, Dr Neeta Gonzalez  Education and Discussions with Family / Patient: All patient questions answered to the best of my ability  Time Spent for Care: 20 minutes  More than 50% of total time spent on counseling and coordination of care as described above  Current Length of Stay: 0 day(s)  Current Patient Status: Observation   Certification Statement: Maintain observation status for now  Will obtain GI consult for persistent nausea      Discharge Plan: Patient is not yet medically stable for discharge  He resides at [facility] and will return there once stable  Code Status: Level 3 - DNAR and DNI    Subjective:   Patient states he is having persistent nausea which started during his last hospitalization when he was diagnosed with tracheobronchitis  Since that time, it has been worsening and keeps him awake at night  He denies any burning chest discomfort  He denies any acid brash  He has occasional belching  He's had several endoscopies in the past by Dr Heidi Dwyer but is unaware of the results  He denies any shortness of breath or pleuritic chest pain  He denies any abdominal pain  He denies any vomiting  Objective:   Vitals:   Temp (24hrs), Av 3 °F (36 8 °C), Min:97 7 °F (36 5 °C), Max:99 2 °F (37 3 °C)    HR:  [56-75] 68  Resp:  [16-20] 18  BP: ()/(49-73) 127/49  SpO2:  [94 %-100 %] 94 %  Body mass index is 26 11 kg/m²  Input and Output Summary (last 24 hours): Intake/Output Summary (Last 24 hours) at 17 1002  Last data filed at 17 0900   Gross per 24 hour   Intake              440 ml   Output              900 ml   Net             -460 ml       Physical Exam:     Physical Exam   Constitutional: He is oriented to person, place, and time  He appears well-developed and well-nourished  HENT:   Head: Normocephalic and atraumatic  Mouth/Throat: Oropharynx is clear and moist and mucous membranes are normal    Eyes: No scleral icterus  Cardiovascular: Normal rate and regular rhythm  No murmur heard  Pulmonary/Chest: Effort normal and breath sounds normal  No respiratory distress  He has no wheezes  He has no rales  He exhibits no tenderness  Abdominal: Soft  Bowel sounds are normal  He exhibits no distension  There is no tenderness  Musculoskeletal: Normal range of motion  He exhibits edema (Bilateral lower extremities  )  Neurological: He is alert and oriented to person, place, and time  Skin: Skin is warm and dry  No rash noted  Psychiatric: He has a normal mood and affect  Vitals reviewed  Additional Data:   Labs:    Results from last 7 days  Lab Units 06/26/17  0438 06/25/17  2350   WBC Thousand/uL 3 99* 7 06   HEMOGLOBIN g/dL 9 4* 10 5*   HEMATOCRIT % 28 8* 31 5*   PLATELETS Thousands/uL 126* 157   NEUTROS PCT %  --  76*   LYMPHS PCT %  --  9*   MONOS PCT %  --  14*   EOS PCT %  --  1       Results from last 7 days  Lab Units 06/26/17  0438 06/25/17  2350   SODIUM mmol/L 138 137   POTASSIUM mmol/L 3 7 3 7   CHLORIDE mmol/L 104 101   CO2 mmol/L 25 24   BUN mg/dL 30* 31*   CREATININE mg/dL 2 00* 2 10*   CALCIUM mg/dL 8 6 8 9   TOTAL PROTEIN g/dL  --  7 1   BILIRUBIN TOTAL mg/dL  --  0 52   ALK PHOS U/L  --  85   ALT U/L  --  15   AST U/L  --  14   GLUCOSE RANDOM mg/dL 167* 166*       Results from last 7 days  Lab Units 06/27/17  0223   INR  1 58*     * I Have Reviewed All Lab Data Listed Above  * Additional Pertinent Lab Tests Reviewed: All Labs Within Last 24 Hours Reviewed    Imaging:    Imaging Reports Reviewed Today Include: CT chest pending    Cultures:   Blood Culture:   Lab Results   Component Value Date    BLOODCX No Growth After 5 Days  03/16/2016    BLOODCX No Growth After 5 Days   03/16/2016     Urine Culture: No results found for: URINECX  Sputum Culture: No components found for: SPUTUMCX  Wound Culture: No results found for: WOUNDCULT    Last 24 Hours Medication List:     aspirin 81 mg Oral Once per day on Mon Thu   bisacodyl 5 mg Oral BID   ferrous sulfate 325 mg Oral BID   fluticasone-salmeterol 1 puff Inhalation Q12H [address]   guaiFENesin 600 mg Oral Q12H [address]   insulin aspart protamine-insulin aspart 14 Units Subcutaneous Early Morning   insulin aspart protamine-insulin aspart 6 Units Subcutaneous Before Dinner   insulin lispro 1-5 Units Subcutaneous HS   insulin lispro 1-6 Units Subcutaneous TID AC   pantoprazole 40 mg Intravenous Q12H [address]   sotalol 80 mg Oral Daily   warfarin 2 5 mg Oral Once per day on Sun Mon Wed Thu Fri Sat   warfarin 5 mg Oral Once per day on Tue   zolpidem 5 mg Oral HS      Today, Patient Was Seen By: Chanda Reed PA-C    ** Please Note: Dragon 360 Dictation voice to text software may have been used in the creation of this document   **

## 2017-06-28 ENCOUNTER — ANESTHESIA EVENT (OUTPATIENT)
Dept: GASTROENTEROLOGY | Facility: HOSPITAL | Age: 82
DRG: 436 | End: 2017-06-28
Payer: COMMERCIAL

## 2017-06-28 ENCOUNTER — GENERIC CONVERSION - ENCOUNTER (OUTPATIENT)
Dept: OTHER | Facility: OTHER | Age: 82
End: 2017-06-28

## 2017-06-28 ENCOUNTER — ANESTHESIA (OUTPATIENT)
Dept: GASTROENTEROLOGY | Facility: HOSPITAL | Age: 82
DRG: 436 | End: 2017-06-28
Payer: COMMERCIAL

## 2017-06-28 ENCOUNTER — APPOINTMENT (INPATIENT)
Dept: RADIOLOGY | Facility: HOSPITAL | Age: 82
DRG: 436 | End: 2017-06-28
Payer: COMMERCIAL

## 2017-06-28 LAB
GLUCOSE SERPL-MCNC: 141 MG/DL (ref 65–140)
GLUCOSE SERPL-MCNC: 192 MG/DL (ref 65–140)
GLUCOSE SERPL-MCNC: 262 MG/DL (ref 65–140)
INR PPP: 1.4 (ref 0.86–1.16)
PROTHROMBIN TIME: 17.2 SECONDS (ref 12.1–14.4)

## 2017-06-28 PROCEDURE — 88342 IMHCHEM/IMCYTCHM 1ST ANTB: CPT | Performed by: INTERNAL MEDICINE

## 2017-06-28 PROCEDURE — 85610 PROTHROMBIN TIME: CPT | Performed by: PHYSICIAN ASSISTANT

## 2017-06-28 PROCEDURE — C9113 INJ PANTOPRAZOLE SODIUM, VIA: HCPCS | Performed by: INTERNAL MEDICINE

## 2017-06-28 PROCEDURE — 0DB98ZX EXCISION OF DUODENUM, VIA NATURAL OR ARTIFICIAL OPENING ENDOSCOPIC, DIAGNOSTIC: ICD-10-PCS | Performed by: INTERNAL MEDICINE

## 2017-06-28 PROCEDURE — 0DB68ZX EXCISION OF STOMACH, VIA NATURAL OR ARTIFICIAL OPENING ENDOSCOPIC, DIAGNOSTIC: ICD-10-PCS | Performed by: INTERNAL MEDICINE

## 2017-06-28 PROCEDURE — 88305 TISSUE EXAM BY PATHOLOGIST: CPT | Performed by: INTERNAL MEDICINE

## 2017-06-28 PROCEDURE — 82948 REAGENT STRIP/BLOOD GLUCOSE: CPT

## 2017-06-28 RX ORDER — PROPOFOL 10 MG/ML
INJECTION, EMULSION INTRAVENOUS AS NEEDED
Status: DISCONTINUED | OUTPATIENT
Start: 2017-06-28 | End: 2017-06-28 | Stop reason: SURG

## 2017-06-28 RX ORDER — SODIUM CHLORIDE 9 MG/ML
INJECTION, SOLUTION INTRAVENOUS CONTINUOUS PRN
Status: DISCONTINUED | OUTPATIENT
Start: 2017-06-28 | End: 2017-06-28 | Stop reason: SURG

## 2017-06-28 RX ADMIN — PROPOFOL 30 MG: 10 INJECTION, EMULSION INTRAVENOUS at 11:04

## 2017-06-28 RX ADMIN — SUCRALFATE 1000 MG: 1 SUSPENSION ORAL at 17:25

## 2017-06-28 RX ADMIN — INSULIN ASPART 6 UNITS: 100 INJECTION, SUSPENSION SUBCUTANEOUS at 17:25

## 2017-06-28 RX ADMIN — Medication 325 MG: at 17:25

## 2017-06-28 RX ADMIN — ZOLPIDEM TARTRATE 5 MG: 5 TABLET, FILM COATED ORAL at 22:57

## 2017-06-28 RX ADMIN — FLUTICASONE PROPIONATE AND SALMETEROL 1 PUFF: 50; 250 POWDER RESPIRATORY (INHALATION) at 22:57

## 2017-06-28 RX ADMIN — PROPOFOL 30 MG: 10 INJECTION, EMULSION INTRAVENOUS at 11:02

## 2017-06-28 RX ADMIN — PANTOPRAZOLE SODIUM 40 MG: 40 INJECTION, POWDER, FOR SOLUTION INTRAVENOUS at 08:16

## 2017-06-28 RX ADMIN — WARFARIN SODIUM 2.5 MG: 2.5 TABLET ORAL at 17:25

## 2017-06-28 RX ADMIN — INSULIN LISPRO 3 UNITS: 100 INJECTION, SOLUTION INTRAVENOUS; SUBCUTANEOUS at 17:25

## 2017-06-28 RX ADMIN — PANTOPRAZOLE SODIUM 40 MG: 40 INJECTION, POWDER, FOR SOLUTION INTRAVENOUS at 22:56

## 2017-06-28 RX ADMIN — BISACODYL 5 MG: 5 TABLET, COATED ORAL at 17:25

## 2017-06-28 RX ADMIN — SODIUM CHLORIDE: 0.9 INJECTION, SOLUTION INTRAVENOUS at 10:53

## 2017-06-28 RX ADMIN — LIDOCAINE HYDROCHLORIDE 100 MG: 20 INJECTION, SOLUTION INTRAVENOUS at 11:00

## 2017-06-28 RX ADMIN — HYDROCODONE BITARTRATE AND ACETAMINOPHEN 1 TABLET: 5; 325 TABLET ORAL at 23:01

## 2017-06-28 RX ADMIN — PROPOFOL 50 MG: 10 INJECTION, EMULSION INTRAVENOUS at 11:00

## 2017-06-28 RX ADMIN — HEPARIN SODIUM 5000 UNITS: 5000 INJECTION, SOLUTION INTRAVENOUS; SUBCUTANEOUS at 13:38

## 2017-06-28 RX ADMIN — HEPARIN SODIUM 5000 UNITS: 5000 INJECTION, SOLUTION INTRAVENOUS; SUBCUTANEOUS at 22:56

## 2017-06-28 RX ADMIN — FLUTICASONE PROPIONATE AND SALMETEROL 1 PUFF: 50; 250 POWDER RESPIRATORY (INHALATION) at 08:16

## 2017-06-28 RX ADMIN — SUCRALFATE 1000 MG: 1 SUSPENSION ORAL at 23:13

## 2017-06-28 RX ADMIN — GUAIFENESIN 600 MG: 600 TABLET, EXTENDED RELEASE ORAL at 22:56

## 2017-06-28 NOTE — ASSESSMENT & PLAN NOTE
Assessment: Exudative (L)     Plan:   · Status post thoracentesis for 525 cc of serosanguineous fluid  Fluid analysis negative for malignancy  · Pulmonary is following    · CT chest results are pending, per pulm note did reveal RLL mass  · To consider underlying malignancy and possible IR bx  · On room air, and stable from pulmonary standpoint

## 2017-06-28 NOTE — PROGRESS NOTES
Progress Note - Denice Fat 80 y o  male MRN: [id number]    Unit/Bed#: [facility] 713-01 Encounter: [id number]      GERD (gastroesophageal reflux disease)   Assessment & Plan     Plan:   · With worsening nausea and loss of appetite over the past month  · Continue Protonix b i d and carafate  · GI input appreciated, s/p EGD today with gastritis and hiatal hernia   · Surgery was consulted by GI regarding hiatal hernia   · Currently on clear liquid diet, will advance as tolerated per GI recommendations         Pleural effusion   Assessment & Plan     Assessment: Exudative (L)     Plan:   · Status post thoracentesis for 525 cc of serosanguineous fluid  Fluid analysis negative for malignancy  · Pulmonary is following  · CT chest results are pending, per pulm note did reveal RLL mass  · To consider underlying malignancy and possible IR bx  · On room air, and stable from pulmonary standpoint         A-fib   Assessment & Plan     Plan:   · Rate controlled on sotalol  · INR subtherapeutic at 1 40  · Continue Coumadin 5 mg tonight and check INR in the morning  · Add subcutaneous heparin until INR is therapeutic  DM2 (diabetes mellitus, type 2)   Assessment & Plan     Plan:   · Hemoglobin A1c 7 9  · Blood sugars reviewed  · Continue current insulin regimen including 70/30 14 units at breakfast and 6 units at dinner  · Continue sliding scale insulin and Accu-Cheks  · Continue to monitor PO intake           VTE Pharmacologic Prophylaxis:   Pharmacologic: Heparin, coumadin   Mechanical VTE Prophylaxis in Place: Yes    Patient Centered Rounds: I have performed bedside rounds with nursing staff today  Discussions with Specialists or Other Care Team Provider: nursing, cm     Education and Discussions with Family / Patient: patient, called son Michelle Jackson to update regarding EGD results and surgery consult    Time Spent for Care: 30 minutes    More than 50% of total time spent on counseling and coordination of care as described above  Current Length of Stay: 0 day(s)    Current Patient Status: Observation   Certification Statement: The patient will continue to require additional inpatient hospital stay due to poor PO intake, further GI recommendations, surgery consult pending per GI, possible IR bx of pulm nodule  Discharge Plan: discharge back to [facility] once medically cleared, EGD revealed gastritis and hiatal hernia and GI consulted surgery  Discussed with son and patient possibility of surgical intervention and they are open to discussion  I did discuss with them regarding medical optimization if he is not surgical candidate given multiple comorbidities and they understand  Pleural fluid analysis negative for malignancy  CT chest revealed right lower lobe lung mass and pulm was considering bx pending EGD results  No primary esophageal or masses noted on EGD  Continue to encourage PO intake, and advance diet as tolerated  Code Status: Level 3 - DNAR and DNI      Subjective:   Patient seen and examined at bedside following EGD  Patient states his epigastric pain has improved but he still reports nausea  He denies reflux of acid or burning sensation in his chest  Reg BM and normal urination  Denies chest pain and shortness of breath  On room air  Objective:     Vitals:   Temp (24hrs), Av 4 °F (36 9 °C), Min:97 8 °F (36 6 °C), Max:98 9 °F (37 2 °C)    HR:  [65-73] 70  Resp:  [16-20] 18  BP: ()/(45-72) 106/72  SpO2:  [93 %-100 %] 95 %  Body mass index is 26 11 kg/m²  Input and Output Summary (last 24 hours): Intake/Output Summary (Last 24 hours) at 17 1454  Last data filed at 17 1300   Gross per 24 hour   Intake              220 ml   Output              575 ml   Net             -355 ml       Physical Exam:     Physical Exam   Constitutional: He is oriented to person, place, and time  He appears well-developed and well-nourished  No distress     HENT:   Head: Normocephalic and atraumatic  Mouth/Throat: Oropharynx is clear and moist    Eyes: EOM are normal  No scleral icterus  Neck: Normal range of motion  Neck supple  Cardiovascular: Normal rate and normal heart sounds  No murmur heard  Irregularly irregular rhythm    Pulmonary/Chest: Effort normal and breath sounds normal  No respiratory distress  He has no wheezes  He has no rales  Lungs clear b/l, on room air  Abdominal: Soft  Bowel sounds are normal    Musculoskeletal: Normal range of motion  He exhibits edema (b/l LE edema +1-2)  Neurological: He is alert and oriented to person, place, and time  Skin: Skin is warm and dry  Psychiatric: He has a normal mood and affect  His behavior is normal    Nursing note and vitals reviewed  Additional Data:     Labs:      Results from last 7 days  Lab Units 06/26/17  0438 06/25/17  2350   WBC Thousand/uL 3 99* 7 06   HEMOGLOBIN g/dL 9 4* 10 5*   HEMATOCRIT % 28 8* 31 5*   PLATELETS Thousands/uL 126* 157   NEUTROS PCT %  --  76*   LYMPHS PCT %  --  9*   MONOS PCT %  --  14*   EOS PCT %  --  1       Results from last 7 days  Lab Units 06/26/17  0438 06/25/17  2350   SODIUM mmol/L 138 137   POTASSIUM mmol/L 3 7 3 7   CHLORIDE mmol/L 104 101   CO2 mmol/L 25 24   BUN mg/dL 30* 31*   CREATININE mg/dL 2 00* 2 10*   CALCIUM mg/dL 8 6 8 9   TOTAL PROTEIN g/dL  --  7 1   BILIRUBIN TOTAL mg/dL  --  0 52   ALK PHOS U/L  --  85   ALT U/L  --  15   AST U/L  --  14   GLUCOSE RANDOM mg/dL 167* 166*       Results from last 7 days  Lab Units 06/28/17  0514   INR  1 40*       * I Have Reviewed All Lab Data Listed Above  * Additional Pertinent Lab Tests Reviewed:  Janel 66 Admission Reviewed    Imaging:    Imaging Reports Reviewed Today Include: cxr   Imaging Personally Reviewed by Myself Includes:  none    Recent Cultures (last 7 days):       Results from last 7 days  Lab Units 06/26/17  1039   GRAM STAIN RESULT  Rare Polys  No bacteria seen   BODY FLUID CULTURE, STERILE  No growth       Last 24 Hours Medication List:     aspirin 81 mg Oral Once per day on Mon Thu   bisacodyl 5 mg Oral BID   ferrous sulfate 325 mg Oral BID   fluticasone-salmeterol 1 puff Inhalation Q12H [address]   guaiFENesin 600 mg Oral Q12H [address]   heparin (porcine) 5,000 Units Subcutaneous Q8H [address]   insulin aspart protamine-insulin aspart 14 Units Subcutaneous Early Morning   insulin aspart protamine-insulin aspart 6 Units Subcutaneous Before Dinner   insulin lispro 1-5 Units Subcutaneous HS   insulin lispro 1-6 Units Subcutaneous TID AC   pantoprazole 40 mg Intravenous Q12H [address]   sotalol 80 mg Oral Daily   sucralfate 1,000 mg Oral Q6H [address]   warfarin 2 5 mg Oral Once per day on Sun Mon Wed Thu Fri Sat   zolpidem 5 mg Oral HS        Today, Patient Was Seen By: Latonya Manuel PA-C    ** Please Note: Dragon 360 Dictation voice to text software may have been used in the creation of this document   **

## 2017-06-28 NOTE — ASSESSMENT & PLAN NOTE
Plan:   · Rate controlled on sotalol  · INR subtherapeutic at 1 40  · Continue Coumadin 5 mg tonight and check INR in the morning  · Add subcutaneous heparin until INR is therapeutic

## 2017-06-28 NOTE — ASSESSMENT & PLAN NOTE
Plan:   · Hemoglobin A1c 7 9  · Blood sugars reviewed  · Continue current insulin regimen including 70/30 14 units at breakfast and 6 units at dinner  · Continue sliding scale insulin and Accu-Cheks    · Continue to monitor PO intake

## 2017-06-28 NOTE — CONSULTS
Consultation - General Surgery   Georgie Mathews 80 y o  male MRN: [id number]  Unit/Bed#: [facility] 713-01 Encounter: [id number]    Assessment/Plan     Assessment: An 80 y o  Male presents with nausea and loss of appetite likely 2/2  Hiatal hernia    Plan:  - advance diet as tolerated  - work up for the left pleural effusion as per primary team  - no acute surgical intervention at this time  Given age and patient is a poor surgical candidate, likely nonoperative candidate, surgery consideration only if the patient develops acute gastric volvulus  - continue with medical therapy  - follow up as an outpatient with Dr Major Morales in the office if desired    History of Present Illness     HPI:  Georgie Mathews is a 80 y o  male who presents on 6/26 with loss of appetite and nausea  Patient was recently admitted from 5/29 to 6/2 for a cough and was treated for tracheobroncitis  Patient had a left sided thoracentesis that drained out 525cc of dark serosangunious fluid on 6/26  Otherwise, patient states he tolerates the clears, and can eat other foods with good bowel function  Patient just has a loss of appetite with some nausea        Inpatient consult to Acute Care Surgery  Performed by: Rachael Flores  Authorized by: ALEENA Burton           Review of Systems   Constitutional: Positive for appetite change  Respiratory: Negative for shortness of breath  Cardiovascular: Negative for chest pain  Gastrointestinal: Positive for nausea  Negative for abdominal pain and constipation  All other systems reviewed and are negative        Historical Information   Past Medical History:   Diagnosis Date    Atrial fibrillation     CHF (congestive heart failure)     Chronic kidney disease (CKD), stage III (moderate)     Chronic venous hypertension with ulcer     Right    COPD (chronic obstructive pulmonary disease)     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 Taking? HYDROcodone-acetaminophen (NORCO) 5-325 mg per tablet   Yes No   Sig: Take 1 tablet by mouth every 6 (six) hours as needed for moderate pain  aspirin 81 MG tablet   Yes No   Sig: Take 81 mg by mouth 2 (two) times a week  Wed and Sun    bisacodyl (DULCOLAX) 5 mg EC tablet   Yes No   Sig: Take 5 mg by mouth 2 (two) times a day   ferrous sulfate 325 (65 Fe) mg tablet   Yes No   Sig: Take 325 mg by mouth 2 (two) times a day   fluticasone furoate-vilanterol (BREO ELLIPTA)   Yes No   Sig: Inhale 1 puff daily   furosemide (LASIX) 40 mg tablet   Yes No   Sig: Take 40 mg by mouth 2 (two) times a day     guaiFENesin (MUCINEX) 600 mg 12 hr tablet   No No   Sig: Take 1 tablet by mouth every 12 (twelve) hours   insulin aspart protamine-insulin aspart (NovoLOG 70/30) 100 units/mL injection   Yes No   Sig: Inject 14 Units under the skin daily in the early morning   insulin aspart protamine-insulin aspart (NovoLOG 70/30) 100 units/mL injection   Yes No   Sig: Inject 6 Units under the skin daily before dinner   pantoprazole (PROTONIX) 40 mg tablet   Yes No   Sig: Take 40 mg by mouth daily     propranolol (INDERAL) 20 mg tablet   Yes No   Sig: Take 20 mg by mouth 2 (two) times a day   sotalol (BETAPACE) 80 mg tablet   No No   Sig: Take 1 tablet by mouth daily   warfarin (COUMADIN) 2 5 mg tablet   Yes No   Sig: Take 2 5 mg by mouth see administration instructions Take 2 5 mg on every day except Tuesday, take 5mg on Tuesday  zolpidem (AMBIEN) 5 mg tablet   Yes No   Sig: Take 5 mg by mouth daily at bedtime as needed for sleep      Facility-Administered Medications: None     No Known Allergies    Objective   First Vitals:   Blood Pressure: 159/70 (06/25/17 2228)  Pulse: 82 (06/25/17 2228)  Temperature: (!) 97 3 °F (36 3 °C) (06/25/17 2228)  Temp Source: Tympanic (06/25/17 2228)  Respirations: 18 (06/25/17 2228)  Height: 5' 9" (175 3 cm) (Stated ) (06/26/17 1249)  Weight - Scale: 83 5 kg (184 lb) (06/25/17 2228)  SpO2: 95 % (06/25/17 2228)    Current Vitals:   Blood Pressure: 106/72 (06/28/17 1145)  Pulse: 70 (06/28/17 1145)  Temperature: 97 8 °F (36 6 °C) (06/28/17 1050)  Temp Source: Oral (06/28/17 1050)  Respirations: 18 (06/28/17 1145)  Height: 5' 9" (175 3 cm) (Stated ) (06/26/17 1249)  Weight - Scale: 80 2 kg (176 lb 12 9 oz) (06/26/17 1249)  SpO2: 95 % (06/28/17 1145)      Intake/Output Summary (Last 24 hours) at 06/28/17 1407  Last data filed at 06/28/17 1300   Gross per 24 hour   Intake              220 ml   Output              575 ml   Net             -355 ml       Invasive Devices     Peripheral Intravenous Line            Peripheral IV 06/25/17 Right Forearm 3 days                Physical Exam   Gen: comfortable, NAD  CVS: RRR  Resp: diminished breath sounds on the left side  Abd: soft, ND, NT  Neuro: awake and alert  TOMLIN: warm    Lab Results:     Lab Results   Component Value Date    WBC 3 99 (L) 06/26/2017    HGB 9 4 (L) 06/26/2017    HCT 28 8 (L) 06/26/2017    MCV 91 06/26/2017     (L) 06/26/2017       Lab Results   Component Value Date    GLUCOSE 167 (H) 06/26/2017    CALCIUM 8 6 06/26/2017     06/26/2017    K 3 7 06/26/2017    CO2 25 06/26/2017     06/26/2017    BUN 30 (H) 06/26/2017    CREATININE 2 00 (H) 06/26/2017       Imaging: I have personally reviewed pertinent films in PACS   CTAP demonstrates stable large hiatal hernia      Counseling / Coordination of Care  Total floor / unit time spent today 30 minutes  Greater than 50% of total time was spent with the patient and / or family counseling and / or coordination of care  A description of the counseling / coordination of care: Plan was discussed with patient

## 2017-06-28 NOTE — PROGRESS NOTES
Progress Note - Pulmonary   Aida  80 y o  male MRN: [id number]  [facility] Encounter: [id number]      Assessment:  1  Left, exudative pleural effusion s/p IR tap 6/26/17  2  Dysphagia  3  Abnormal CT Chest with RLL mass  4  Hx tobacco use and asbestos exposure    Plan:  1  Await final pleural fluid studies and cytology  2  For EGD later today  Depending upon findings/results of EGD may need IR biopsy of lung mass  3  Continue advair  4  Incentive spirometry Q1hr, OOB as able    Subjective:   Mr Tan Doss is seen laying in bed this morning  He is scheduled for EGD later today  He continues to feel well from a pulmonary standpoint, but unfortunately, continues to have significant complaints of "indigestion "  Appetite remains poor  He denies chest pain, resting shortness of breath, fevers, cough or bronchospasm  Objective:     Vitals: Blood pressure 126/66, pulse 65, temperature 98 4 °F (36 9 °C), temperature source Oral, resp  rate 18, height 5' 9" (1 753 m), weight 80 2 kg (176 lb 12 9 oz), SpO2 94 % , RA rest, Body mass index is 26 11 kg/m²  Intake/Output Summary (Last 24 hours) at 06/28/17 0929  Last data filed at 06/28/17 0815   Gross per 24 hour   Intake              300 ml   Output              975 ml   Net             -675 ml         Physical Exam  Gen: Awake, alert, oriented x 3, no acute distress  HEENT: Mucous membranes moist, no oral lesions, no thrush  NECK: No accessory muscle use, JVP not elevated  Cardiac: Regular, single S1, single S2, no murmurs, no rubs, no gallops  Lungs: Decreased breath sounds bilaterally without wheezes, rhonchi or rales bilaterally  Abdomen: normoactive bowel sounds, soft nontender, nondistended, no rebound or rigidity, no guarding  Extremities: no cyanosis, no clubbing   +1 generalized edema of the lower extremities  LE are contracted    Labs: I have personally reviewed pertinent lab results  , ABG: No results found for: PHART, XUU8LHB, PO2ART, TMQ9LNV, Y4WJGIER, BEART, SOURCE, BNP: No results found for: BNP, CBC: No results found for: WBC, HGB, HCT, MCV, PLT, ADJUSTEDWBC, MCH, MCHC, RDW, MPV, NRBC, CMP: No results found for: NA, K, CL, CO2, ANIONGAP, BUN, CREATININE, GLUCOSE, CALCIUM, AST, ALT, ALKPHOS, PROT, ALBUMIN, BILITOT, EGFR, PT/INR:   Lab Results   Component Value Date    INR 1 40 (H) 06/28/2017   , Troponin: No results found for: TROPONINI     Pleural Fluid Studies  LDH - 319  TP - 3 9  Glucose - 190  PH - 7 4  WBC - 1118, Diff with 83% Lymphs  Gram stain & Culture - Rare polys with no bacteria seen  Cytology - PENDING  AFB - No AFB seen    Imaging and other studies: I have personally reviewed pertinent films in PACS    CTChest 6/26/17  By my read shows smooth boarderd lung mass in the RLL  No calcification noted  Esophagus appears thickened along with thickened gastric lining  Will await official read      Shena Presley PA-C

## 2017-06-28 NOTE — ASSESSMENT & PLAN NOTE
Plan:   · With worsening nausea and loss of appetite over the past month  · Continue Protonix b i d and carafate  · GI input appreciated, s/p EGD today with gastritis and hiatal hernia   · Surgery was consulted by GI regarding hiatal hernia   · Currently on clear liquid diet, will advance as tolerated per GI recommendations

## 2017-06-28 NOTE — OP NOTE
**** GI/ENDOSCOPY REPORT ****     PATIENT NAME: Kishore Neal - VISIT ID:  Patient ID: [id number]   YOB: 1928     INTRODUCTION: Esophagogastroduodenoscopy - A 80 male patient presents for   an outpatient Esophagogastroduodenoscopy at [facility]  INDICATIONS: Nausea  Dry heaving  CONSENT: The benefits, risks, and alternatives to the procedure were   discussed and informed consent was obtained from the patient  PREPARATION:  EKG, pulse, pulse oximetry and blood pressure were monitored   throughout the procedure  ASA Classification: Class 4 - Patient has severe   systemic disturbance that is life threatening with or without surgery  MEDICATIONS: Anesthesia-check records     PROCEDURE:  The endoscope was passed without difficulty through the mouth   under direct visualization and advanced to the 2nd portion of the   duodenum  The scope was withdrawn and the mucosa was carefully examined  Retroflexion was performed  FINDINGS:   Esophagus: The esophagus appeared to be normal       Stomach: There was a large hiatus hernia visible in the stomach  Gastritis was   found in the stomach  Multiple biopsies was taken  Duodenum: The   duodenum appeared to be normal     Multiple random biopsies was taken  COMPLICATIONS: There were no complications  IMPRESSIONS: Normal esophagus  A hiatus hernia found  Gastritis found  Multiple biopsies taken  Normal duodenum  Multiple biopsies taken  RECOMMENDATIONS: Follow-up on the results of the biopsy specimens  Anti-reflux measures: Raise the head of the bed 4 to 6 inches  Avoid   smoking  Avoid excess coffee, tea or other caffeinated beverages  Avoid   garments that fit tightly through the abdomen  Avoid eating before bed  Continue current medications  Return to floor  Surgery consult to discuss   risks and benefits of Nissen fundoplication       ESTIMATED BLOOD LOSS:     PATHOLOGY SPECIMENS: Multiple biopsies taken  Associated finding:   Gastritis  Multiple random biopsies taken  PROCEDURE CODES:     ICD-9 Codes: 787 02 Nausea alone 787 03 Vomiting alone 553 3 Diaphragmatic   hernia without mention of obstruction or gangrene 535 50 Unspecified   gastritides and gastroduodenitis, without mention of hemorrhage     ICD-10 Codes: R11 0 Nausea R11 1 Vomiting K44 Diaphragmatic hernia K29   Gastritis and duodenitis     PERFORMED BY: MYNOR Sinha  on 06/28/2017  Version 1, electronically signed by MYNOR Salinas  on 06/28/2017   at 11:31

## 2017-06-29 ENCOUNTER — APPOINTMENT (INPATIENT)
Dept: RADIOLOGY | Facility: HOSPITAL | Age: 82
DRG: 436 | End: 2017-06-29
Payer: COMMERCIAL

## 2017-06-29 PROBLEM — K76.9 LIVER LESION: Status: ACTIVE | Noted: 2017-06-29

## 2017-06-29 PROBLEM — R91.8 RIGHT LOWER LOBE LUNG MASS: Chronic | Status: ACTIVE | Noted: 2017-06-29

## 2017-06-29 LAB
BACTERIA SPEC BFLD CULT: NO GROWTH
GLUCOSE SERPL-MCNC: 118 MG/DL (ref 65–140)
GLUCOSE SERPL-MCNC: 149 MG/DL (ref 65–140)
GLUCOSE SERPL-MCNC: 194 MG/DL (ref 65–140)
GLUCOSE SERPL-MCNC: 286 MG/DL (ref 65–140)
GRAM STN SPEC: NORMAL
GRAM STN SPEC: NORMAL

## 2017-06-29 PROCEDURE — 82948 REAGENT STRIP/BLOOD GLUCOSE: CPT

## 2017-06-29 PROCEDURE — 74176 CT ABD & PELVIS W/O CONTRAST: CPT

## 2017-06-29 PROCEDURE — C9113 INJ PANTOPRAZOLE SODIUM, VIA: HCPCS | Performed by: INTERNAL MEDICINE

## 2017-06-29 RX ORDER — PANTOPRAZOLE SODIUM 40 MG/1
40 TABLET, DELAYED RELEASE ORAL
Status: DISCONTINUED | OUTPATIENT
Start: 2017-06-30 | End: 2017-07-03

## 2017-06-29 RX ORDER — MIRTAZAPINE 15 MG/1
15 TABLET, FILM COATED ORAL
Status: DISCONTINUED | OUTPATIENT
Start: 2017-06-29 | End: 2017-07-08 | Stop reason: HOSPADM

## 2017-06-29 RX ADMIN — FLUTICASONE PROPIONATE AND SALMETEROL 1 PUFF: 50; 250 POWDER RESPIRATORY (INHALATION) at 21:18

## 2017-06-29 RX ADMIN — GUAIFENESIN 600 MG: 600 TABLET, EXTENDED RELEASE ORAL at 21:18

## 2017-06-29 RX ADMIN — IOHEXOL 50 ML: 240 INJECTION, SOLUTION INTRATHECAL; INTRAVASCULAR; INTRAVENOUS; ORAL at 09:20

## 2017-06-29 RX ADMIN — SUCRALFATE 1000 MG: 1 SUSPENSION ORAL at 11:56

## 2017-06-29 RX ADMIN — PANTOPRAZOLE SODIUM 40 MG: 40 INJECTION, POWDER, FOR SOLUTION INTRAVENOUS at 09:19

## 2017-06-29 RX ADMIN — MIRTAZAPINE 15 MG: 15 TABLET, FILM COATED ORAL at 21:18

## 2017-06-29 RX ADMIN — ZOLPIDEM TARTRATE 5 MG: 5 TABLET, FILM COATED ORAL at 21:18

## 2017-06-29 RX ADMIN — INSULIN LISPRO 2 UNITS: 100 INJECTION, SOLUTION INTRAVENOUS; SUBCUTANEOUS at 11:56

## 2017-06-29 RX ADMIN — GUAIFENESIN 600 MG: 600 TABLET, EXTENDED RELEASE ORAL at 09:18

## 2017-06-29 RX ADMIN — INSULIN ASPART 14 UNITS: 100 INJECTION, SUSPENSION SUBCUTANEOUS at 05:26

## 2017-06-29 RX ADMIN — INSULIN ASPART 6 UNITS: 100 INJECTION, SUSPENSION SUBCUTANEOUS at 17:44

## 2017-06-29 RX ADMIN — HEPARIN SODIUM 5000 UNITS: 5000 INJECTION, SOLUTION INTRAVENOUS; SUBCUTANEOUS at 21:19

## 2017-06-29 RX ADMIN — HYDROCODONE BITARTRATE AND ACETAMINOPHEN 1 TABLET: 5; 325 TABLET ORAL at 21:34

## 2017-06-29 RX ADMIN — Medication 325 MG: at 17:43

## 2017-06-29 RX ADMIN — BISACODYL 5 MG: 5 TABLET, COATED ORAL at 17:43

## 2017-06-29 RX ADMIN — SUCRALFATE 1000 MG: 1 SUSPENSION ORAL at 05:25

## 2017-06-29 RX ADMIN — WARFARIN SODIUM 2.5 MG: 2.5 TABLET ORAL at 17:43

## 2017-06-29 RX ADMIN — Medication 325 MG: at 09:18

## 2017-06-29 RX ADMIN — SUCRALFATE 1000 MG: 1 SUSPENSION ORAL at 17:43

## 2017-06-29 RX ADMIN — ASPIRIN 81 MG 81 MG: 81 TABLET ORAL at 09:18

## 2017-06-29 RX ADMIN — SOTALOL HYDROCHLORIDE 80 MG: 80 TABLET ORAL at 09:19

## 2017-06-29 RX ADMIN — FLUTICASONE PROPIONATE AND SALMETEROL 1 PUFF: 50; 250 POWDER RESPIRATORY (INHALATION) at 09:19

## 2017-06-29 RX ADMIN — HEPARIN SODIUM 5000 UNITS: 5000 INJECTION, SOLUTION INTRAVENOUS; SUBCUTANEOUS at 13:58

## 2017-06-29 RX ADMIN — INSULIN LISPRO 2 UNITS: 100 INJECTION, SOLUTION INTRAVENOUS; SUBCUTANEOUS at 21:19

## 2017-06-29 RX ADMIN — BISACODYL 5 MG: 5 TABLET, COATED ORAL at 09:18

## 2017-06-29 RX ADMIN — HEPARIN SODIUM 5000 UNITS: 5000 INJECTION, SOLUTION INTRAVENOUS; SUBCUTANEOUS at 05:25

## 2017-06-29 NOTE — ASSESSMENT & PLAN NOTE
· Patient had thoracentesis which results were negative for malignancy  · Pulmonary input appreciated  · Patient is going for a CAT scan of the abdomen and pelvis

## 2017-06-29 NOTE — ASSESSMENT & PLAN NOTE
Plan:   · With worsening nausea and loss of appetite over the past month  · Continue Protonix b i d and carafate  · GI input appreciated, s/p EGD  with gastritis and hiatal hernia   · Surgery was consulted by GI regarding hiatal hernia however he is a poor surgical candidate so there will be no plan for surgical intervention at this time    · We'll advance diet

## 2017-06-29 NOTE — CASE MANAGEMENT
Joe Hunter, RN Registered Nurse Addendum Case Management Date of Service: 6/26/2017  8:14 AM   Network Utilization Review [address]  Main Number [phone]; Fax [phone]    L&D/PEDS/NICU Medical Necessity Denials should be called to the [facility] Nurses    ATTENTION: Be aware that we have recently moved to a new  office  We have a new phone and fax for the Network Utilization Review Department  Call with any questions or concerns to [phone] and follow the prompts  All voicemails are confidential  Fax determinations, denials, and requests for initial/continue stay review clinical to [phone]  For all requests, it would be easier if you could please send logs  *Due to high call volume, we can respond faster if you email us to our secured email listed above  *  Initial Clinical Review     Admission: Date/Time/Statement: [email or web address]           Orders Placed This Encounter   Procedures    Place in Observation (expected length of stay for this patient is less than two midnights)       Standing Status:   Standing       Number of Occurrences:   1       Order Specific Question:   Admitting Physician       Answer:   Bertram Seay [[id number]]       Order Specific Question:   Level of Care       Answer:   Med Surg [16]            ED: Date/Time/Mode of Arrival:             ED Arrival Information      Expected Arrival Acuity Means of Arrival Escorted By Service Admission Type     - 6/25/2017 22:14 Urgent Walk-In Self General Medicine Urgent     Arrival Complaint     abdominal pain             Chief Complaint:   Chief Complaint   Patient presents with    Nausea       persistent nausea and decreased appetite for 3 weeks         History of Illness:Kumar Ramirez is a 80 y  o  male who was admitted recently on May 29 up to June 2, 2017 for a cough  He was recently diagnosed to have tracheobronchitis  Patient was initially on cefepime IV however was discharged on ciprofloxacin  Likewise, he received a short course of steroids and was discharged with prednisone 10 mg daily for a total of 5 days      Instrument time, the patient is claiming that he has no appetite to eat in fact he feels "sick" whenever he looks at food  Otherwise, the patient does not have any fever  He does complain of being sick to the stomach and points at mid epigastric area  Patient likewise claims that every time he takes food there is somewhat increased epigastric discomfort      Looking at his laboratories, patient has hemoglobin of 10 and 5 initially was 12 5  He also has ongoing chronic kidney disease with a creatinine of 2 1 when it is always at 2 02 14 his BUN is at 12      In any case, a chest x-ray that was taken and revealed the presence of left pleural effusion  The patient does not seem to clear in any distress and in fact claims that he can breathe okay  However, the patient still has some nausea      ED Vital Signs:            ED Triage Vitals [06/25/17 2228]   Temperature Pulse Respirations Blood Pressure SpO2   (!) 97 3 °F (36 3 °C) 82 18 159/70 95 %       Temp Source Heart Rate Source Patient Position BP Location FiO2 (%)   Tympanic Monitor Sitting Right arm --       Pain Score           No Pain                Wt Readings from Last 1 Encounters:   06/25/17 83 5 kg (184 lb)         Vital Signs (abnormal):      Abnormal Labs/Diagnostic Test Results:   HEMOGLOBIN g/dL 10 5*   HEMATOCRIT % 31 5*   PLATELETS Thousands/uL 157   NEUTROS PCT % 76*   LYMPHS PCT % 9*   MONOS PCT % 14*      BUN mg/dL 31*   CREATININE mg/dL 2 10*      GLUCOSE RANDOM mg/dL 166*         CXR:Hiatal hernia   Pacemaker   No acute findings   Faint stable nodularity in the left upper lobe      IR THORACENTESIS: Impression: Successful therapeutic/diagnostic ultrasound-guided thoracentesis          ED Treatment:   Medication Administration from 06/25/2017 1641 to 06/26/2017 9988        Date/Time Order Dose Route Action Action by Comments     06/25/2017 2338 lidocaine viscous (XYLOCAINE) 2 % mucosal solution 15 mL 15 mL Swish & Spit Given Roger Parry RN         84/82/0822 2338 aluminum-magnesium hydroxide-simethicone (MYLANTA) 200-200-20 mg/5 mL oral suspension 30 mL 30 mL Oral Given Roger Parry RN               Past Medical/Surgical History:         Active Ambulatory Problems     Diagnosis Date Noted    DVT (deep venous thrombosis) 03/08/2016    Acute deep vein thrombosis (DVT) of femoral vein of right lower extremity 03/08/2016    DM2 (diabetes mellitus, type 2) 03/08/2016    Chronic ulcer of right foot 03/08/2016    CKD (chronic kidney disease) stage 3, GFR 30-59 ml/min 03/08/2016    Peripheral vascular disease 03/08/2016    COPD (chronic obstructive pulmonary disease) 03/08/2016    GERD (gastroesophageal reflux disease) 03/08/2016    History of cirrhosis 03/08/2016    Pancytopenia 03/10/2016    Cough 05/29/2017    Chronic diastolic congestive heart failure 05/29/2017    History of DVT (deep vein thrombosis) 05/29/2017    Acute tracheobronchitis 06/02/2017           Resolved Ambulatory Problems     Diagnosis Date Noted    Chronic systolic heart failure 05/20/9444           Past Medical History:   Diagnosis Date    Atrial fibrillation      CHF (congestive heart failure)      Chronic kidney disease (CKD), stage III (moderate)      Chronic venous hypertension with ulcer      COPD (chronic obstructive pulmonary disease)      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      GERD (gastroesophageal reflux disease)      Hypertension      PVD (peripheral vascular disease)           Admitting Diagnosis: Loss of appetite [R63 0]  Nausea [R11 0]  Fatigue [R53 83]  Pleural effusion, left [J90]     Age/Sex: 80 y o  male     Assessment/Plan:   Hospital Problem List:      Principal Problem:    Pleural effusion  Active Problems:    GERD (gastroesophageal reflux disease)    Loss of appetite    Nausea    DM2 (diabetes mellitus, type 2)    Acute tracheobronchitis        Plan for the Primary Problem(s):  · Observation, medical surgical floor  · Pleural effusion  We will refer to IR for drainage and subsequent studies  · Loss of appetite  Patient currently claims that he has nausea looking at food  But we would like to find out whether the nausea may be related to current pleural effusion  Patient does not look to be in any distress  Will also get nutritional consult regards to caloric requirements and intake  We will leave to day team whether the patient may need further workup such as gastroenterology consult for possible scope  Patient placed on Protonix 40 mg IV twice daily      Plan for Additional Problem(s):   · Diabetes mellitus  We'll continue with usual dose of 70/30 at 14 units in the morning and 6 units before dinner   Also, we'll place patient on sliding scale      VTE Prophylaxis: Warfarin (Coumadin)  / sequential compression device   Code Status: Level 3 - DNAR and DNI   POLST: There is no POLST form on file for this patient (pre-hospital)     Anticipated Length of Stay: Tomas Simons will be admitted on an Observation basis with an anticipated length of stay of  less than 2 midnights    Justification for Hospital Stay: Please see detailed plans noted above         Admission Orders:  OBS  OOB  CONS CARB DIET  Scheduled Meds:   aspirin 81 mg Oral Once per day on Mon Thu   bisacodyl 5 mg Oral BID   ferrous sulfate 325 mg Oral BID   fluticasone-salmeterol 1 puff Inhalation Q12H [address]   guaiFENesin 600 mg Oral Q12H [address]   insulin aspart protamine-insulin aspart 14 Units Subcutaneous Early Morning   insulin aspart protamine-insulin aspart 6 Units Subcutaneous Before Dinner   insulin lispro 1-5 Units Subcutaneous HS   insulin lispro 1-6 Units Subcutaneous TID AC   pantoprazole 40 mg Intravenous Q12H [address]   sotalol 80 mg Oral Daily   warfarin 2 5 mg Oral Once per day on Sun Mon Wed Thu Fri Sat [START ON 6/27/2017] warfarin 5 mg Oral Once per day on Tue   zolpidem 5 mg Oral HS      Continuous Infusions:    PRN Meds:   acetaminophen    aluminum-magnesium hydroxide-simethicone    HYDROcodone-acetaminophen    ondansetron     6/27 PROGRESS NOTE:      * Nausea   Assessment & Plan                            Plan:   · GI consult        A-fib   Assessment & Plan                            Plan:   · Rate controlled on sotalol  · INR subtherapeutic at 1 58  Continue Coumadin 5 mg tonight and check INR in the morning  Add subcutaneous heparin until INR is therapeutic        Pleural effusion   Assessment & Plan                            Assessment: Exudative                            Plan:   · Status post thoracentesis for 525 cc of serosanguineous fluid  Fluid analysis is pending  · Pulmonary is following  · CT chest results are pending  · To consider underlying malignancy        Loss of appetite   Assessment & Plan                            Plan:   · GI consult       GERD (gastroesophageal reflux disease)   Assessment & Plan                            Plan:   · Continue Protonix b i d   · Consult GI for ongoing nausea  · Add Carafate        DM2 (diabetes mellitus, type 2)   Assessment & Plan                            Plan:   · Hemoglobin A1c 7 9  · Blood sugars: 317, 176, 186  · Continue current insulin regimen including 70/30 14 units at breakfast and 6 units at dinner  · Continue sliding scale insulin and Accu-Cheks           VTE Pharmacologic Prophylaxis:   Pharmacologic: Will add heparin SC until INR is therapeutic  Mechanical: Mechanical VTE prophylaxis in place      Patient Centered Rounds: I have performed bedside rounds with nursing staff today  Discussions with Specialists or Other Care Team Provider: Patient's PCP, Dr Yenni Heninng  Education and Discussions with Family / Patient: All patient questions answered to the best of my ability    Time Spent for Care: 20 minutes   More than 50% of total time spent on counseling and coordination of care as described above      Current Length of Stay: 0 day(s)  Current Patient [address] observation status for now  Will obtain GI consult for persistent nausea      Discharge Plan: Patient is not yet medically stable for discharge  He resides at [facility] will return there once stable  Code Status: Level 3 - DNAR and DNI     Subjective:   Patient states he is having persistent nausea which started during his last hospitalization when he was diagnosed with tracheobronchitis  Since that time, it has been worsening and keeps him awake at night  He denies any burning chest discomfort   He denies any acid brash   He has occasional belching   He's had several endoscopies in the past by Dr Dianne Correia but is unaware of the results  He denies any shortness of breath or pleuritic chest pain  He denies any abdominal pain  He denies any vomiting         ======================================================================================    06/28/17 1739  Inpatient Admission Once     Transfer Service: General Medicine       Question Answer Comment   Admitting Physician Luis Navarro    Level of Care Med Surg    Estimated length of stay More than 2 Midnights    Certification I certify that inpatient services are medically necessary for this patient for a duration of greater than two midnights  See H&P and MD Progress Notes for additional information about the patient's course of treatment              GERD (gastroesophageal reflux disease)   Assessment & Plan                            Plan:   · With worsening nausea and loss of appetite over the past month  · Continue Protonix b i d and carafate  · GI input appreciated, s/p EGD today with gastritis and hiatal hernia   · Surgery was consulted by GI regarding hiatal hernia   · Currently on clear liquid diet, will advance as tolerated per GI recommendations        Pleural effusion   Assessment & Plan                            Assessment: Exudative (L)                            Plan:   · Status post thoracentesis for 525 cc of serosanguineous fluid  Fluid analysis negative for malignancy  · Pulmonary is following  · CT chest results are pending, per pulm note did reveal RLL mass  · To consider underlying malignancy and possible IR bx  · On room air, and stable from pulmonary standpoint        A-fib   Assessment & Plan                            Plan:   · Rate controlled on sotalol  · INR subtherapeutic at 1 40  · Continue Coumadin 5 mg tonight and check INR in the morning  · Add subcutaneous heparin until INR is therapeutic        DM2 (diabetes mellitus, type 2)   Assessment & Plan                            Plan:   · Hemoglobin A1c 7 9  · Blood sugars reviewed  · Continue current insulin regimen including 70/30 14 units at breakfast and 6 units at dinner  · Continue sliding scale insulin and Accu-Cheks  · Continue to monitor PO intake           VTE Pharmacologic Prophylaxis:   Pharmacologic: Heparin, coumadin   Mechanical VTE Prophylaxis in Place: Yes  Current Length of Stay: 0 day(s)     Current Patient Status: Observation   Certification Statement: The patient will continue to require additional inpatient hospital stay due to poor PO intake, further GI recommendations, surgery consult pending per GI, possible IR bx of pulm nodule       Discharge Plan: discharge back to [facility] once medically cleared, EGD revealed gastritis and hiatal hernia and GI consulted surgery  Discussed with son and patient possibility of surgical intervention and they are open to discussion  I did discuss with them regarding medical optimization if he is not surgical candidate given multiple comorbidities and they understand  Pleural fluid analysis negative for malignancy  CT chest revealed right lower lobe lung mass and pulm was considering bx pending EGD results   No primary esophageal or masses noted on EGD   Continue to encourage PO intake, and advance diet as tolerated         Vitals:   Temp (24hrs), Av 4 °F (36 9 °C), Min:97 8 °F (36 6 °C), Max:98 9 °F (37 2 °C)     HR:  [65-73] 70  Resp:  [16-20] 18  BP: ()/(45-72) 106/72  SpO2:  [93 %-100 %] 95 %  Body mass index is 26 11 kg/m²       Input and Output Summary (last 24 hours):         Intake/Output Summary (Last 24 hours) at 17 1454  Last data filed at 17 1300    Gross per 24 hour   Intake              220 ml   Output              575 ml   Net             -355 ml         Results from last 7 days  Lab Units 17  0438 17  2350   WBC Thousand/uL 3 99* 7 06   HEMOGLOBIN g/dL 9 4* 10 5*   HEMATOCRIT % 28 8* 31 5*   PLATELETS Thousands/uL 126* 157   NEUTROS PCT %  --  76*   LYMPHS PCT %  --  9*   MONOS PCT %  --  14*   EOS PCT %  --  1         Results from last 7 days  Lab Units 17  0438 17  2350   SODIUM mmol/L 138 137   POTASSIUM mmol/L 3 7 3 7   CHLORIDE mmol/L 104 101   CO2 mmol/L 25 24   BUN mg/dL 30* 31*   CREATININE mg/dL 2 00* 2 10*   CALCIUM mg/dL 8 6 8 9   TOTAL PROTEIN g/dL  --  7 1   BILIRUBIN TOTAL mg/dL  --  0 52   ALK PHOS U/L  --  85   ALT U/L  --  15   AST U/L  --  14   GLUCOSE RANDOM mg/dL 167* 166*         Results from last 7 days  Lab Units 17  0514   INR   1 40*      Results from last 7 days  Lab Units 17  1039   GRAM STAIN RESULT   Rare Polys  No bacteria seen   BODY FLUID CULTURE, STERILE   No growth         Last 24 Hours Medication List:      aspirin 81 mg Oral Once per day on    bisacodyl 5 mg Oral BID   ferrous sulfate 325 mg Oral BID   fluticasone-salmeterol 1 puff Inhalation Q12H [facility]   guaiFENesin 600 mg Oral Q12H [address]   heparin (porcine) 5,000 Units Subcutaneous Q8H [address]   insulin aspart protamine-insulin aspart 14 Units Subcutaneous Early Morning   insulin aspart protamine-insulin aspart 6 Units Subcutaneous Before Dinner   insulin lispro 1-5 Units Subcutaneous HS   insulin lispro 1-6 Units Subcutaneous TID AC   pantoprazole 40 mg Intravenous Q12H [address]   sotalol 80 mg Oral Daily   sucralfate 1,000 mg Oral Q6H [address]   warfarin 2 5 mg Oral Once per day on Sun Mon Wed Thu Fri Sat   zolpidem 5 mg Oral HS         =====================================================================================  6/29/2017      * Nausea   Assessment & Plan                            Plan:   · GI consult/ input appreciated  · EGD showed hiatal hernia and multiple biopsies taken  ? Results of the biopsies are pending at this time  · Patient is showing signs of hunger will advance diet to regular  · Will start Remeron given loss of appetite  Patient is willing to try anything        Liver lesion   Assessment & Plan        ? Await CAT scan of the abdomen and pelvis to further identify  ? Patient may need biopsy  ? Could possibly contributing to his nausea and lack of appetite        Right lower lobe lung mass   Assessment & Plan        ? Patient had thoracentesis which results were negative for malignancy  ? Pulmonary input appreciated  ? Patient is going for a CAT scan of the abdomen and pelvis        A-fib   Assessment & Plan                            Plan:   · Rate controlled on sotalol  · INR subtherapeutic yesterday  · Continue Coumadin 5 mg tonight and check INR in the morning  · Added subcutaneous heparin until INR is therapeutic           GERD (gastroesophageal reflux disease)   Assessment & Plan                            Plan:   · With worsening nausea and loss of appetite over the past month  · Continue Protonix b i d and carafate  · GI input appreciated, s/p EGD  with gastritis and hiatal hernia   · Surgery was consulted by GI regarding hiatal hernia however he is a poor surgical candidate so there will be no plan for surgical intervention at this time    · We'll advance diet       DM2 (diabetes mellitus, type 2)   Assessment & Plan     Plan:   · Hemoglobin A1c 7 9  · Blood sugars reviewed  · Continue current insulin regimen   · Continue sliding scale insulin and Accu-Cheks  · Continue to monitor PO intake           VTE Pharmacologic Prophylaxis:   Pharmacologic: Heparin  Mechanical VTE Prophylaxis in Place: Yes     Patient Centered Rounds: I have performed bedside rounds with nursing staff today    Current Length of Stay: 1 day(s)     Current Patient Status: Inpatient   Certification Statement: The patient will continue to require additional inpatient hospital stay due to Intractable nausea and inability to eat      Discharge Plan: Not medically stable enough for discharge

## 2017-06-29 NOTE — ASSESSMENT & PLAN NOTE
Plan:   · GI consult/ input appreciated  · EGD showed hiatal hernia and multiple biopsies taken  · Results of the biopsies are pending at this time  · Patient is showing signs of hunger will advance diet to regular  · Will start Remeron given loss of appetite  Patient is willing to try anything

## 2017-06-29 NOTE — PROGRESS NOTES
Progress Note - Nestor Ribera 80 y o  male MRN: [id number]    Unit/Bed#: [facility] 713-01 Encounter: [id number]    ASSESSMENT/ PLAN:   79 yo male admitted with pleural effusion being evaluated for persistent nausea and decreased appetite  1  Persistent Nausea: s/p EGD yesterday revealed large HH visible in the stomach, which is most likely causing his nausea as well as gastritis  -continue PPI daily  -continue carafate as needed  -f/u bx's  -consider surgical consult to discuss risks and benefits of nissen fundoplication  -should have outpatient follow up  -GI will sign off for now, please call with any further questions     Subjective:   Patient seen and examined  Objective:     Vitals: Blood pressure (!) 114/49, pulse 69, temperature 98 7 °F (37 1 °C), temperature source Oral, resp  rate 18, height 5' 9" (1 753 m), weight 80 2 kg (176 lb 12 9 oz), SpO2 95 %  ,Body mass index is 26 11 kg/m²        Intake/Output Summary (Last 24 hours) at 06/29/17 1224  Last data filed at 06/29/17 0900   Gross per 24 hour   Intake              300 ml   Output              350 ml   Net              -50 ml       Physical Exam:     General Appearance: Alert, appears stated age and cooperative  Lungs: Clear to auscultation bilaterally, no rales or rhonchi, no labored breathing/accessory muscle use  Heart: Regular rate and rhythm, S1, S2 normal, no murmur, click, rub or gallop  Abdomen: Soft, non-tender, non-distended; bowel sounds normal; no masses or no organomegaly  Extremities: No cyanosis, clubbing, edema    Invasive Devices     Peripheral Intravenous Line            Peripheral IV 06/25/17 Right Forearm 4 days                Lab Results:      Results from last 7 days  Lab Units 06/26/17  0438 06/25/17  2350   WBC Thousand/uL 3 99* 7 06   HEMOGLOBIN g/dL 9 4* 10 5*   HEMATOCRIT % 28 8* 31 5*   PLATELETS Thousands/uL 126* 157   NEUTROS PCT %  --  76*   LYMPHS PCT %  --  9*   MONOS PCT %  --  14*   EOS PCT %  --  1       Results from last 7 days  Lab Units 06/26/17  0438 06/25/17  2350   SODIUM mmol/L 138 137   POTASSIUM mmol/L 3 7 3 7   CHLORIDE mmol/L 104 101   CO2 mmol/L 25 24   BUN mg/dL 30* 31*   CREATININE mg/dL 2 00* 2 10*   CALCIUM mg/dL 8 6 8 9   TOTAL PROTEIN g/dL  --  7 1   BILIRUBIN TOTAL mg/dL  --  0 52   ALK PHOS U/L  --  85   ALT U/L  --  15   AST U/L  --  14   GLUCOSE RANDOM mg/dL 167* 166*       Results from last 7 days  Lab Units 06/28/17  0514   INR  1 40*       Results from last 7 days  Lab Units 06/25/17  2350   LIPASE u/L 61*       Imaging Studies: I have personally reviewed pertinent imaging studies  Xr Chest 2 Views  Result Date: 6/26/2017  Impression: 1  Left pleural effusion, developing since 5/29/2017  2   Probable atelectasis and/or pneumonia in the base of the left lower lobe  3   Questionable 1 cm nodule in the right lower lobe  Ct Chest Wo Contrast  Result Date: 6/28/2017  Impression: Multiple hypodense lesions in the liver most suspicious for metastatic disease  4 1 cm soft tissue mass in the periportal region, representing metastatic adenopathy, or possibly pancreatic neoplasm  Stable benign right lung nodules  Minimal left pleural effusion  Calcified pleural plaques consistent with prior asbestos related disease  Stable large hiatal hernia with intrathoracic stomach  Ir Thoracentesis  Result Date: 6/26/2017  Impression: Impression: Successful therapeutic/diagnostic ultrasound-guided thoracentesis

## 2017-06-29 NOTE — ASSESSMENT & PLAN NOTE
· Await CAT scan of the abdomen and pelvis to further identify  · Patient may need biopsy  · Could possibly contributing to his nausea and lack of appetite

## 2017-06-29 NOTE — ASSESSMENT & PLAN NOTE
Plan:   · Rate controlled on sotalol  · INR subtherapeutic yesterday  · Continue Coumadin 5 mg tonight and check INR in the morning  · Added subcutaneous heparin until INR is therapeutic

## 2017-06-29 NOTE — PROGRESS NOTES
[facility] internal medicine  Progress Note - Isreal Mckeon 80 y o  male MRN: [id number]    Unit/Bed#: [facility] 497-31 Encounter: [id number]     Date of Service: June 29, 2017      * Nausea   Assessment & Plan     Plan:   · GI consult/ input appreciated  · EGD showed hiatal hernia and multiple biopsies taken  · Results of the biopsies are pending at this time  · Patient is showing signs of hunger will advance diet to regular  · Will start Remeron given loss of appetite  Patient is willing to try anything  Liver lesion   Assessment & Plan      · Await CAT scan of the abdomen and pelvis to further identify  · Patient may need biopsy  · Could possibly contributing to his nausea and lack of appetite  Right lower lobe lung mass   Assessment & Plan      · Patient had thoracentesis which results were negative for malignancy  · Pulmonary input appreciated  · Patient is going for a CAT scan of the abdomen and pelvis  A-fib   Assessment & Plan     Plan:   · Rate controlled on sotalol  · INR subtherapeutic yesterday  · Continue Coumadin 5 mg tonight and check INR in the morning  · Added subcutaneous heparin until INR is therapeutic  GERD (gastroesophageal reflux disease)   Assessment & Plan     Plan:   · With worsening nausea and loss of appetite over the past month  · Continue Protonix b i d and carafate  · GI input appreciated, s/p EGD  with gastritis and hiatal hernia   · Surgery was consulted by GI regarding hiatal hernia however he is a poor surgical candidate so there will be no plan for surgical intervention at this time  · We'll advance diet        DM2 (diabetes mellitus, type 2)   Assessment & Plan     Plan:   · Hemoglobin A1c 7 9  · Blood sugars reviewed  · Continue current insulin regimen   · Continue sliding scale insulin and Accu-Cheks    · Continue to monitor PO intake           VTE Pharmacologic Prophylaxis:   Pharmacologic: Heparin  Mechanical VTE Prophylaxis in Place: Yes    Patient Centered Rounds: I have performed bedside rounds with nursing staff today  Discussions with Specialists or Other Care Team Provider: Primary RN    Education and Discussions with Family / Patient: Patient, offered to call son and he said he was "unreachable"    Time Spent for Care: 20 minutes  More than 50% of total time spent on counseling and coordination of care as described above  Current Length of Stay: 1 day(s)    Current Patient Status: Inpatient   Certification Statement: The patient will continue to require additional inpatient hospital stay due to Intractable nausea and inability to eat  Discharge Plan: Not medically stable enough for discharge     Code Status: Level 3 - DNAR and DNI      Subjective:   Complains of feeling not hungry but no nausea today  Wishes we can give him a pill to fix it  When he thinks about food he could be hungry for a grilled cheese and scrambled eggs with ketchup  Offers no complaints of chest pain or shortness of breath currently  No abdominal pain  Objective:     Vitals:   Temp (24hrs), Av 1 °F (37 3 °C), Min:98 7 °F (37 1 °C), Max:99 7 °F (37 6 °C)    HR:  [67-79] 69  Resp:  [18] 18  BP: ()/(42-56) 114/49  SpO2:  [92 %-95 %] 95 %  Body mass index is 26 11 kg/m²  Input and Output Summary (last 24 hours): Intake/Output Summary (Last 24 hours) at 17 1430  Last data filed at 17 1100   Gross per 24 hour   Intake              480 ml   Output              350 ml   Net              130 ml       Physical Exam:     Physical Exam   Constitutional: He is oriented to person, place, and time  He appears well-nourished  No distress  HENT:   Head: Normocephalic  Eyes: Pupils are equal, round, and reactive to light  Neck: Normal range of motion  Neck supple  Cardiovascular: Normal rate and regular rhythm  No murmur heard  Pulmonary/Chest: Effort normal and breath sounds normal    Abdominal: Soft   Bowel sounds are normal  Musculoskeletal: Normal range of motion  Neurological: He is alert and oriented to person, place, and time  Skin: Skin is warm and dry  Psychiatric: He has a normal mood and affect  His behavior is normal    Nursing note and vitals reviewed  Additional Data:     Labs:      Results from last 7 days  Lab Units 06/26/17  0438 06/25/17  2350   WBC Thousand/uL 3 99* 7 06   HEMOGLOBIN g/dL 9 4* 10 5*   HEMATOCRIT % 28 8* 31 5*   PLATELETS Thousands/uL 126* 157   NEUTROS PCT %  --  76*   LYMPHS PCT %  --  9*   MONOS PCT %  --  14*   EOS PCT %  --  1       Results from last 7 days  Lab Units 06/26/17  0438 06/25/17  2350   SODIUM mmol/L 138 137   POTASSIUM mmol/L 3 7 3 7   CHLORIDE mmol/L 104 101   CO2 mmol/L 25 24   BUN mg/dL 30* 31*   CREATININE mg/dL 2 00* 2 10*   CALCIUM mg/dL 8 6 8 9   TOTAL PROTEIN g/dL  --  7 1   BILIRUBIN TOTAL mg/dL  --  0 52   ALK PHOS U/L  --  85   ALT U/L  --  15   AST U/L  --  14   GLUCOSE RANDOM mg/dL 167* 166*       Results from last 7 days  Lab Units 06/28/17  0514   INR  1 40*       * I Have Reviewed All Lab Data Listed Above  Imaging:    Imaging Reports Reviewed Today Include: Ct head: Multiple hypodense lesions in the liver most suspicious for metastatic disease   4 1 cm soft tissue mass in the periportal region, representing metastatic adenopathy, or possibly pancreatic neoplasm  Stable benign right lung nodules   Minimal left pleural effusion   Calcified pleural plaques consistent with prior asbestos related disease  Stable large hiatal hernia with intrathoracic stomach      Recent Cultures (last 7 days):       Results from last 7 days  Lab Units 06/26/17  1039   GRAM STAIN RESULT  Rare Polys  No bacteria seen   BODY FLUID CULTURE, STERILE  No growth       Last 24 Hours Medication List:     aspirin 81 mg Oral Once per day on Mon Thu   bisacodyl 5 mg Oral BID   ferrous sulfate 325 mg Oral BID   fluticasone-salmeterol 1 puff Inhalation Q12H DEMIAN   guaiFENesin 600 mg Oral Q12H [address]   heparin (porcine) 5,000 Units Subcutaneous Q8H [address]   insulin aspart protamine-insulin aspart 14 Units Subcutaneous Early Morning   insulin aspart protamine-insulin aspart 6 Units Subcutaneous Before Dinner   insulin lispro 1-5 Units Subcutaneous HS   insulin lispro 1-6 Units Subcutaneous TID AC   [START ON 6/30/2017] pantoprazole 40 mg Oral Early Morning   sotalol 80 mg Oral Daily   sucralfate 1,000 mg Oral Q6H [address]   warfarin 2 5 mg Oral Once per day on Sun Mon Wed Thu Fri Sat   zolpidem 5 mg Oral HS        Today, Patient Was Seen By: MEREDITH Gallegos    ** Please Note: Dragon 360 Dictation voice to text software may have been used in the creation of this document   **

## 2017-06-29 NOTE — ASSESSMENT & PLAN NOTE
Plan:   · Hemoglobin A1c 7 9  · Blood sugars reviewed  · Continue current insulin regimen   · Continue sliding scale insulin and Accu-Cheks    · Continue to monitor PO intake

## 2017-06-30 PROBLEM — K86.89 PANCREATIC MASS: Status: ACTIVE | Noted: 2017-06-30

## 2017-06-30 LAB
ALBUMIN SERPL BCP-MCNC: 2 G/DL (ref 3.5–5)
ALP SERPL-CCNC: 66 U/L (ref 46–116)
ALT SERPL W P-5'-P-CCNC: 11 U/L (ref 12–78)
ANION GAP SERPL CALCULATED.3IONS-SCNC: 7 MMOL/L (ref 4–13)
AST SERPL W P-5'-P-CCNC: 18 U/L (ref 5–45)
BILIRUB SERPL-MCNC: 0.45 MG/DL (ref 0.2–1)
BUN SERPL-MCNC: 25 MG/DL (ref 5–25)
CALCIUM SERPL-MCNC: 8.2 MG/DL (ref 8.3–10.1)
CHLORIDE SERPL-SCNC: 107 MMOL/L (ref 100–108)
CO2 SERPL-SCNC: 26 MMOL/L (ref 21–32)
CREAT SERPL-MCNC: 1.58 MG/DL (ref 0.6–1.3)
ERYTHROCYTE [DISTWIDTH] IN BLOOD BY AUTOMATED COUNT: 15.2 % (ref 11.6–15.1)
GFR SERPL CREATININE-BSD FRML MDRD: 41.6 ML/MIN/1.73SQ M
GLUCOSE SERPL-MCNC: 147 MG/DL (ref 65–140)
GLUCOSE SERPL-MCNC: 187 MG/DL (ref 65–140)
GLUCOSE SERPL-MCNC: 203 MG/DL (ref 65–140)
GLUCOSE SERPL-MCNC: 203 MG/DL (ref 65–140)
GLUCOSE SERPL-MCNC: 91 MG/DL (ref 65–140)
HCT VFR BLD AUTO: 30.2 % (ref 36.5–49.3)
HGB BLD-MCNC: 9.6 G/DL (ref 12–17)
MCH RBC QN AUTO: 29.8 PG (ref 26.8–34.3)
MCHC RBC AUTO-ENTMCNC: 31.8 G/DL (ref 31.4–37.4)
MCV RBC AUTO: 94 FL (ref 82–98)
PLATELET # BLD AUTO: 104 THOUSANDS/UL (ref 149–390)
PMV BLD AUTO: 10.7 FL (ref 8.9–12.7)
POTASSIUM SERPL-SCNC: 4.1 MMOL/L (ref 3.5–5.3)
PROT SERPL-MCNC: 5.6 G/DL (ref 6.4–8.2)
RBC # BLD AUTO: 3.22 MILLION/UL (ref 3.88–5.62)
SODIUM SERPL-SCNC: 140 MMOL/L (ref 136–145)
WBC # BLD AUTO: 3.94 THOUSAND/UL (ref 4.31–10.16)

## 2017-06-30 PROCEDURE — 85027 COMPLETE CBC AUTOMATED: CPT | Performed by: NURSE PRACTITIONER

## 2017-06-30 PROCEDURE — 80053 COMPREHEN METABOLIC PANEL: CPT | Performed by: NURSE PRACTITIONER

## 2017-06-30 PROCEDURE — 82948 REAGENT STRIP/BLOOD GLUCOSE: CPT

## 2017-06-30 RX ADMIN — INSULIN LISPRO 1 UNITS: 100 INJECTION, SOLUTION INTRAVENOUS; SUBCUTANEOUS at 08:16

## 2017-06-30 RX ADMIN — BISACODYL 5 MG: 5 TABLET, COATED ORAL at 08:16

## 2017-06-30 RX ADMIN — INSULIN ASPART 14 UNITS: 100 INJECTION, SUSPENSION SUBCUTANEOUS at 06:52

## 2017-06-30 RX ADMIN — HEPARIN SODIUM 5000 UNITS: 5000 INJECTION, SOLUTION INTRAVENOUS; SUBCUTANEOUS at 21:41

## 2017-06-30 RX ADMIN — SOTALOL HYDROCHLORIDE 80 MG: 80 TABLET ORAL at 08:16

## 2017-06-30 RX ADMIN — BISACODYL 5 MG: 5 TABLET, COATED ORAL at 17:17

## 2017-06-30 RX ADMIN — INSULIN ASPART 6 UNITS: 100 INJECTION, SUSPENSION SUBCUTANEOUS at 17:17

## 2017-06-30 RX ADMIN — FLUTICASONE PROPIONATE AND SALMETEROL 1 PUFF: 50; 250 POWDER RESPIRATORY (INHALATION) at 08:16

## 2017-06-30 RX ADMIN — Medication 325 MG: at 08:16

## 2017-06-30 RX ADMIN — HEPARIN SODIUM 5000 UNITS: 5000 INJECTION, SOLUTION INTRAVENOUS; SUBCUTANEOUS at 13:21

## 2017-06-30 RX ADMIN — PANTOPRAZOLE SODIUM 40 MG: 20 TABLET, DELAYED RELEASE ORAL at 06:57

## 2017-06-30 RX ADMIN — HEPARIN SODIUM 5000 UNITS: 5000 INJECTION, SOLUTION INTRAVENOUS; SUBCUTANEOUS at 06:46

## 2017-06-30 RX ADMIN — FLUTICASONE PROPIONATE AND SALMETEROL 1 PUFF: 50; 250 POWDER RESPIRATORY (INHALATION) at 21:42

## 2017-06-30 RX ADMIN — SUCRALFATE 1000 MG: 1 SUSPENSION ORAL at 12:04

## 2017-06-30 RX ADMIN — ZOLPIDEM TARTRATE 5 MG: 5 TABLET, FILM COATED ORAL at 21:41

## 2017-06-30 RX ADMIN — GUAIFENESIN 600 MG: 600 TABLET, EXTENDED RELEASE ORAL at 08:16

## 2017-06-30 RX ADMIN — MIRTAZAPINE 15 MG: 15 TABLET, FILM COATED ORAL at 21:41

## 2017-06-30 RX ADMIN — WARFARIN SODIUM 2.5 MG: 2.5 TABLET ORAL at 17:17

## 2017-06-30 RX ADMIN — INSULIN LISPRO 1 UNITS: 100 INJECTION, SOLUTION INTRAVENOUS; SUBCUTANEOUS at 21:42

## 2017-06-30 RX ADMIN — GUAIFENESIN 600 MG: 600 TABLET, EXTENDED RELEASE ORAL at 21:41

## 2017-06-30 RX ADMIN — SUCRALFATE 1000 MG: 1 SUSPENSION ORAL at 06:53

## 2017-06-30 RX ADMIN — SUCRALFATE 1000 MG: 1 SUSPENSION ORAL at 17:17

## 2017-06-30 RX ADMIN — INSULIN LISPRO 2 UNITS: 100 INJECTION, SOLUTION INTRAVENOUS; SUBCUTANEOUS at 17:17

## 2017-06-30 RX ADMIN — Medication 325 MG: at 17:17

## 2017-06-30 RX ADMIN — SUCRALFATE 1000 MG: 1 SUSPENSION ORAL at 00:52

## 2017-06-30 NOTE — ASSESSMENT & PLAN NOTE
· Patient had thoracentesis which results were negative for malignancy however showed high percentage of lymphocytes  · Pulmonary input appreciated    · CAT scan of the A/P noted  · This is likely a malignant effusion

## 2017-06-30 NOTE — ASSESSMENT & PLAN NOTE
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

## 2017-06-30 NOTE — PROGRESS NOTES
Progress Note - Pulmonary   Nestor Peers 80 y o  male MRN: [id number]  Unit/Bed#: [facility] 713-01 Encounter: [id number]      Assessment / Plan :  1  Left Exudative Pleural Effusion Lymphocyte predominant negative for malignancy  S/P Thoracentesis on 6/26/17   · Reviewed final results with attending and with SLIM  · Continue to monitor for reoccurrence  Only repeat CXR should his oxygen requirements worsen or he has SOB  2  Dysphagia   · Continue with diet per speech therapy recommendations     3  Abnormal CT Chest with RLL Lung Mass   · No Biopsy needed at this time  EGD did show Gastritis and GI obtained multiple biopsies taken  GI did sign off of patient however given recent CT Abdomen consider having the team re see the patient for recommendations vs  Consult Oncology     4  History of Tobacco abuse and asbestos exposure   · Continue Advair BID   · ISQ1H, OOB as able     5  Abnormal CT Abdomen with Suspected Liver Mets and Possible mass on pancreatic head   · Possible IR Biopsy of Liver Mets if patient is agreeable / wishes to have further testing  Subjective:   Mr Fabiano Garcia is laying in bed upon assessment today  He states that his breathing is stable and he offers no pulmonary complaints  He does complain of increased belching and indigestion  He states he has no appetite and when he does eat everything tastes like cardboard  He denies any fevers, chills, night sweats, or wheezing  He uses a wheel chair at baseline for mobility  Objective:   Vitals: Blood pressure 112/51, pulse 82, temperature 99 8 °F (37 7 °C), temperature source Oral, resp  rate 18, height 5' 9" (1 753 m), weight 80 2 kg (176 lb 12 9 oz), SpO2 92 % , RA, Body mass index is 26 11 kg/m²        Intake/Output Summary (Last 24 hours) at 06/30/17 1507  Last data filed at 06/30/17 1218   Gross per 24 hour   Intake              680 ml   Output              770 ml   Net              -90 ml         Physical Exam  Gen: Awake, alert, no acute distress  HEENT: Mucous membranes moist, no oral lesions, no thrush  NECK: No accessory muscle use, JVP not elevated  Cardiac: Regular, single S1, single S2, no murmurs, no rubs, no gallops  Lungs: Decreased in the bilateral lower lobes without any wheezes rales or rhonchi   Abdomen: normoactive bowel sounds, soft nontender, nondistended, no rebound or rigidity, no guarding  Extremities: no cyanosis, no clubbing, no edema    Labs: I have personally reviewed pertinent lab results  , ABG: No results found for: PHART, PSS8EGJ, PO2ART, PQM4XWP, Y8WZXTYV, BEART, SOURCE, BNP: No results found for: BNP, CBC:   Lab Results   Component Value Date    WBC 3 94 (L) 06/30/2017    HGB 9 6 (L) 06/30/2017    HCT 30 2 (L) 06/30/2017    MCV 94 06/30/2017     (L) 06/30/2017    MCH 29 8 06/30/2017    MCHC 31 8 06/30/2017    RDW 15 2 (H) 06/30/2017    MPV 10 7 06/30/2017   , CMP:   Lab Results   Component Value Date     06/30/2017    K 4 1 06/30/2017     06/30/2017    CO2 26 06/30/2017    ANIONGAP 7 06/30/2017    BUN 25 06/30/2017    CREATININE 1 58 (H) 06/30/2017    GLUCOSE 147 (H) 06/30/2017    CALCIUM 8 2 (L) 06/30/2017    AST 18 06/30/2017    ALT 11 (L) 06/30/2017    ALKPHOS 66 06/30/2017    PROT 5 6 (L) 06/30/2017    ALBUMIN 2 0 (L) 06/30/2017    BILITOT 0 45 06/30/2017    EGFR 41 6 06/30/2017   , PT/INR: No results found for: PT, INR, Troponin: No results found for: TROPONINI      Pleural Fluid Analysis:  Serum LDH: 321    Pleural fluid LDH: 319    Serum total protein: 7 1    Pleural fluid total protein: 3 9     Pleural fluid pH: 7 4   Pleural fluid glucose: 190  Pleural fluid cytology: Negative for Malignancy   Pleural fluid Gram stain and culture: No Growth     Pleural fluid is consistent with Exudate   Lymphocyte Predominant     Imaging and other studies: I have personally reviewed pertinent films in PACS  CT Chest 6/26/17:  FINDINGS:     LUNGS:  There is no change in the well-circumscribed right lower lobe low-attenuation nodule posteriorly, measuring 1 9 cm, which is therefore benign, likely a hamartoma  Also unchanged is a 5 mm right middle lobe nodule, with tiny calcification,   compatible with a calcified granuloma  There is mild left basilar consolidation compatible with compressive atelectasis  No infiltrates  There is no tracheal or endobronchial lesion      PLEURA:  Minimal left pleural effusion  No right pleural effusion  Calcified pleural plaques again seen bilaterally consistent with prior asbestos related disease      HEART/GREAT VESSELS:  Unremarkable for patient's age  Pacer leads terminate in the right atrium and right ventricle      MEDIASTINUM AND MARVIN:  No adenopathy  Stable large hiatal hernia with intrathoracic stomach      CHEST WALL AND LOWER NECK:  Stable bilateral gynecomastia  Pacer generator in the left upper anterior chest wall  No adenopathy      VISUALIZED STRUCTURES IN THE UPPER ABDOMEN:  There are multiple small hypodense lesions scattered throughout the liver, most suspicious for metastatic disease, largest measuring 2 5 x 2 2 cm in the anterior aspect of the left hepatic lobe  There appears   to be a new, ill-defined soft tissue mass in the upper abdomen adjacent to previously seen calcifications and varices in the rose marie hepatis, measuring 4 1 x 2 3 cm  This represents metastatic adenopathy, or possibly a pancreatic neoplasm, however the   pancreas is diffusely atrophic      OSSEOUS STRUCTURES:  No acute fracture  No destructive osseous lesion      IMPRESSION:     Multiple hypodense lesions in the liver most suspicious for metastatic disease  4 1 cm soft tissue mass in the periportal region, representing metastatic adenopathy, or possibly pancreatic neoplasm      Stable benign right lung nodules  Minimal left pleural effusion    Calcified pleural plaques consistent with prior asbestos related disease      Stable large hiatal hernia with intrathoracic stomach      Findings were personally discussed via telephone with Dr Sabrina Najera at the time of dictation      Olga Woo PA-C

## 2017-06-30 NOTE — PROGRESS NOTES
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   · Left message for son Anabell Andrew with my call back #  · Would favor hospice care rather than biopsy but will discuss with family  · Consult palliative care        * Nausea   Assessment & Plan     Plan:   · GI consult/ input appreciated  · EGD showed hiatal hernia and multiple biopsies taken  · Results of the biopsies are pending at this time  · Patient is showing signs of intermittent hunger will advance diet to regular  · Started Remeron given loss of appetite  · Likely  secondary to pancreatic mass  Liver lesion   Assessment & Plan      · Reviewed CAT scan of the abdomen and pelvis   · Patient may need biopsy but will discuss with the patient's son before ordering any further testing  · Is likely contributing to his nausea and lack of appetite  Right lower lobe lung mass   Assessment & Plan      · Patient had thoracentesis which results were negative for malignancy however showed high percentage of lymphocytes  · Pulmonary input appreciated  · CAT scan of the A/P noted  · This is likely a malignant effusion         A-fib   Assessment & Plan     Plan:   · Rate controlled on sotalol  · INR subtherapeutic yesterday  · Continue Coumadin 5 mg tonight and check INR in the morning  · Added subcutaneous heparin until INR is therapeutic            GERD (gastroesophageal reflux disease)   Assessment & Plan     Plan:   · With worsening nausea and loss of appetite over the past month  · Continue Protonix b i d and carafate  · GI input appreciated, s/p EGD  with gastritis and hiatal hernia   · Surgery was consulted by GI regarding hiatal hernia however he is a poor surgical candidate so there will be no plan for surgical intervention at this time  · We'll advance diet        DM2 (diabetes mellitus, type 2)   Assessment & Plan     Plan:   · Hemoglobin A1c 7 9  · Blood sugars reviewed  · Continue current insulin regimen   · Continue sliding scale insulin and Accu-Cheks  · Continue to monitor PO intake             Did not share results of the CT scan with the patient yet as waiting to speak with family first          VTE Pharmacologic Prophylaxis:   Pharmacologic: Heparin  Mechanical VTE Prophylaxis in Place: Yes    Patient Centered Rounds: I have performed bedside rounds with nursing staff today  Discussions with Specialists or Other Care Team Provider: Primary RN    Education and Discussions with Family / Patient: Patient, left message for son with hospital cell phone # to call back    Time Spent for Care: 30 minutes  More than 50% of total time spent on counseling and coordination of care as described above  Current Length of Stay: 2 day(s)    Current Patient Status: Inpatient   Certification Statement: The patient will continue to require additional inpatient hospital stay due to nausea and new pancreatic mass with liver lesions  Discharge Plan: not medically stable yet for discharge and will consult palliative care if family agrees    Code Status: Level 3 - DNAR and DNI      Subjective:   Complains of feeling full, no appetite, belching  No abdominal pain  No vomiting  No nausea currently  No fever or chills  Is hoping he will be able to eat his pancakes tomorrow morning  Objective:     Vitals:   Temp (24hrs), Av 3 °F (37 4 °C), Min:99 °F (37 2 °C), Max:99 8 °F (37 7 °C)    HR:  [73-82] 82  Resp:  [18] 18  BP: (112-141)/(51-61) 112/51  SpO2:  [91 %-97 %] 92 %  Body mass index is 26 11 kg/m²  Input and Output Summary (last 24 hours):        Intake/Output Summary (Last 24 hours) at 17 1521  Last data filed at 17 1218   Gross per 24 hour   Intake              680 ml   Output              770 ml Net              -90 ml       Physical Exam:     Physical Exam   Constitutional: He is oriented to person, place, and time  He appears well-nourished  No distress  HENT:   Head: Normocephalic  Eyes: Pupils are equal, round, and reactive to light  Neck: Normal range of motion  Neck supple  Cardiovascular: Normal rate, regular rhythm and normal heart sounds  Pulmonary/Chest: Effort normal and breath sounds normal    Abdominal: Soft  Bowel sounds are normal    Musculoskeletal: Normal range of motion  Neurological: He is alert and oriented to person, place, and time  Skin: Skin is warm and dry  Psychiatric: He has a normal mood and affect  His behavior is normal    Nursing note and vitals reviewed  Additional Data:     Labs:      Results from last 7 days  Lab Units 06/30/17  0525  06/25/17  2350   WBC Thousand/uL 3 94*  < > 7 06   HEMOGLOBIN g/dL 9 6*  < > 10 5*   HEMATOCRIT % 30 2*  < > 31 5*   PLATELETS Thousands/uL 104*  < > 157   NEUTROS PCT %  --   --  76*   LYMPHS PCT %  --   --  9*   MONOS PCT %  --   --  14*   EOS PCT %  --   --  1   < > = values in this interval not displayed  Results from last 7 days  Lab Units 06/30/17  0525   SODIUM mmol/L 140   POTASSIUM mmol/L 4 1   CHLORIDE mmol/L 107   CO2 mmol/L 26   BUN mg/dL 25   CREATININE mg/dL 1 58*   CALCIUM mg/dL 8 2*   TOTAL PROTEIN g/dL 5 6*   BILIRUBIN TOTAL mg/dL 0 45   ALK PHOS U/L 66   ALT U/L 11*   AST U/L 18   GLUCOSE RANDOM mg/dL 147*       Results from last 7 days  Lab Units 06/28/17  0514   INR  1 40*       * I Have Reviewed All Lab Data Listed Above  CT scan of the abdomen and pelvis: Multiple liver lesions not consistent with cysts likely metastatic disease   MRI liver or biopsy recommended  Soft tissue in the rose marie hepatis region measuring 4 1 x 2 4 cm could represent adenopathy or a pancreatic head mass  Alfonzo Erazo is no pancreatic tissue identified otherwise   Biopsy should be considered      Large hiatal hernia containing the entire stomach compressing the left lung base   Left pleural effusion  Stable benign right lower lobe nodule  Gallstones without surrounding inflammation  Evidence of portal hypertension with splenomegaly and multiple vascular shunts  Recent Cultures (last 7 days):       Results from last 7 days  Lab Units 06/26/17  1039   GRAM STAIN RESULT  Rare Polys  No bacteria seen   BODY FLUID CULTURE, STERILE  No growth       Last 24 Hours Medication List:     aspirin 81 mg Oral Once per day on Mon Thu   bisacodyl 5 mg Oral BID   ferrous sulfate 325 mg Oral BID   fluticasone-salmeterol 1 puff Inhalation Q12H [facility]   guaiFENesin 600 mg Oral Q12H [facility]   heparin (porcine) 5,000 Units Subcutaneous Q8H [address]   insulin aspart protamine-insulin aspart 14 Units Subcutaneous Early Morning   insulin aspart protamine-insulin aspart 6 Units Subcutaneous Before Dinner   insulin lispro 1-5 Units Subcutaneous HS   insulin lispro 1-6 Units Subcutaneous TID AC   mirtazapine 15 mg Oral HS   pantoprazole 40 mg Oral Early Morning   sotalol 80 mg Oral Daily   sucralfate 1,000 mg Oral Q6H [address]   warfarin 2 5 mg Oral Once per day on Sun Mon Wed Thu Fri Sat   zolpidem 5 mg Oral HS        Today, Patient Was Seen By: MEREDITH Harper    ** Please Note: Dragon 360 Dictation voice to text software may have been used in the creation of this document   **

## 2017-06-30 NOTE — ASSESSMENT & PLAN NOTE
Plan:   · GI consult/ input appreciated  · EGD showed hiatal hernia and multiple biopsies taken  · Results of the biopsies are pending at this time  · Patient is showing signs of intermittent hunger will advance diet to regular  · Started Remeron given loss of appetite  · Likely  secondary to pancreatic mass

## 2017-06-30 NOTE — ASSESSMENT & PLAN NOTE
· Reviewed CAT scan of the abdomen and pelvis   · Patient may need biopsy but will discuss with the patient's son before ordering any further testing  · Is likely contributing to his nausea and lack of appetite

## 2017-07-01 LAB
GLUCOSE SERPL-MCNC: 174 MG/DL (ref 65–140)
GLUCOSE SERPL-MCNC: 174 MG/DL (ref 65–140)
GLUCOSE SERPL-MCNC: 186 MG/DL (ref 65–140)
GLUCOSE SERPL-MCNC: 201 MG/DL (ref 65–140)
INR PPP: 1.83 (ref 0.86–1.16)
PROTHROMBIN TIME: 21.3 SECONDS (ref 12.1–14.4)

## 2017-07-01 PROCEDURE — 85610 PROTHROMBIN TIME: CPT | Performed by: NURSE PRACTITIONER

## 2017-07-01 PROCEDURE — 82948 REAGENT STRIP/BLOOD GLUCOSE: CPT

## 2017-07-01 RX ADMIN — WARFARIN SODIUM 2.5 MG: 2.5 TABLET ORAL at 16:47

## 2017-07-01 RX ADMIN — BISACODYL 5 MG: 5 TABLET, COATED ORAL at 16:47

## 2017-07-01 RX ADMIN — SUCRALFATE 1000 MG: 1 SUSPENSION ORAL at 05:55

## 2017-07-01 RX ADMIN — SUCRALFATE 1000 MG: 1 SUSPENSION ORAL at 16:47

## 2017-07-01 RX ADMIN — BISACODYL 5 MG: 5 TABLET, COATED ORAL at 09:20

## 2017-07-01 RX ADMIN — SUCRALFATE 1000 MG: 1 SUSPENSION ORAL at 02:10

## 2017-07-01 RX ADMIN — SUCRALFATE 1000 MG: 1 SUSPENSION ORAL at 22:33

## 2017-07-01 RX ADMIN — FLUTICASONE PROPIONATE AND SALMETEROL 1 PUFF: 50; 250 POWDER RESPIRATORY (INHALATION) at 09:20

## 2017-07-01 RX ADMIN — Medication 325 MG: at 16:47

## 2017-07-01 RX ADMIN — Medication 325 MG: at 09:20

## 2017-07-01 RX ADMIN — SOTALOL HYDROCHLORIDE 80 MG: 80 TABLET ORAL at 09:20

## 2017-07-01 RX ADMIN — INSULIN LISPRO 1 UNITS: 100 INJECTION, SOLUTION INTRAVENOUS; SUBCUTANEOUS at 12:22

## 2017-07-01 RX ADMIN — INSULIN LISPRO 1 UNITS: 100 INJECTION, SOLUTION INTRAVENOUS; SUBCUTANEOUS at 16:47

## 2017-07-01 RX ADMIN — INSULIN LISPRO 1 UNITS: 100 INJECTION, SOLUTION INTRAVENOUS; SUBCUTANEOUS at 22:21

## 2017-07-01 RX ADMIN — FLUTICASONE PROPIONATE AND SALMETEROL 1 PUFF: 50; 250 POWDER RESPIRATORY (INHALATION) at 22:21

## 2017-07-01 RX ADMIN — PANTOPRAZOLE SODIUM 40 MG: 20 TABLET, DELAYED RELEASE ORAL at 05:57

## 2017-07-01 RX ADMIN — GUAIFENESIN 600 MG: 600 TABLET, EXTENDED RELEASE ORAL at 20:13

## 2017-07-01 RX ADMIN — MIRTAZAPINE 15 MG: 15 TABLET, FILM COATED ORAL at 22:20

## 2017-07-01 RX ADMIN — INSULIN ASPART 14 UNITS: 100 INJECTION, SUSPENSION SUBCUTANEOUS at 06:05

## 2017-07-01 RX ADMIN — HEPARIN SODIUM 5000 UNITS: 5000 INJECTION, SOLUTION INTRAVENOUS; SUBCUTANEOUS at 05:57

## 2017-07-01 RX ADMIN — GUAIFENESIN 600 MG: 600 TABLET, EXTENDED RELEASE ORAL at 09:20

## 2017-07-01 RX ADMIN — ZOLPIDEM TARTRATE 5 MG: 5 TABLET, FILM COATED ORAL at 22:20

## 2017-07-01 RX ADMIN — SUCRALFATE 1000 MG: 1 SUSPENSION ORAL at 12:22

## 2017-07-01 RX ADMIN — INSULIN ASPART 6 UNITS: 100 INJECTION, SUSPENSION SUBCUTANEOUS at 16:46

## 2017-07-01 RX ADMIN — INSULIN LISPRO 1 UNITS: 100 INJECTION, SOLUTION INTRAVENOUS; SUBCUTANEOUS at 09:20

## 2017-07-01 RX ADMIN — HYDROCODONE BITARTRATE AND ACETAMINOPHEN 1 TABLET: 5; 325 TABLET ORAL at 22:20

## 2017-07-01 NOTE — ASSESSMENT & PLAN NOTE
Plan:   · With worsening nausea and loss of appetite over the past month  · Continue Protonix b i d and carafate  · GI input appreciated, s/p EGD  with gastritis and hiatal hernia   · Surgery was consulted by GI regarding hiatal hernia however he is a poor surgical candidate so there will be no plan for surgical intervention at this time    · Continue diet as tolerated

## 2017-07-01 NOTE — PROGRESS NOTES
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   · Left message for son Michelle Smoke with my call back # again today  · Would favor hospice care rather than biopsy but will discuss with family  · Have not shared results of CT scan with patient yet  · Will discuss with son before telling patient   · Will consider palliative care consult after talking with son        * Nausea   Assessment & Plan     Plan:   · GI consult/ input appreciated  · EGD showed hiatal hernia and multiple biopsies taken  · Results of the biopsies are pending at this time  · Started Remeron given loss of appetite  · Likely  secondary to pancreatic mass with metastatic disease  · Continue supportive care         Right lower lobe lung mass   Assessment & Plan      · Patient had thoracentesis which results were negative for malignancy however showed high percentage of lymphocytes  · Pulmonary input appreciated  · CAT scan of the A/P noted  · This is likely a malignant effusion         A-fib   Assessment & Plan     Plan:   · Rate controlled on sotalol  · INR subtherapeutic yesterday  · Continue Coumadin 5 mg tonight and check INR in the morning  · Added subcutaneous heparin until INR is therapeutic  GERD (gastroesophageal reflux disease)   Assessment & Plan     Plan:   · With worsening nausea and loss of appetite over the past month  · Continue Protonix b i d and carafate  · GI input appreciated, s/p EGD  with gastritis and hiatal hernia   · Surgery was consulted by GI regarding hiatal hernia however he is a poor surgical candidate so there will be no plan for surgical intervention at this time    · Continue diet as tolerated        DM2 (diabetes mellitus, type 2)   Assessment & Plan     Plan: · Hemoglobin A1c 7 9  · Blood sugars reviewed  · Continue current insulin regimen   · Continue sliding scale insulin and Accu-Cheks  · Continue to monitor PO intake             VTE Pharmacologic Prophylaxis:   Pharmacologic: Warfarin (Coumadin)  Mechanical VTE Prophylaxis in Place: Yes    Patient Centered Rounds: I have performed bedside rounds with nursing staff today  Discussions with Specialists or Other Care Team Provider: Primary RN    Education and Discussions with Family / Patient: Patient, messages left for patient's son Friday as well as today with callback number    Time Spent for Care: 30 minutes  More than 50% of total time spent on counseling and coordination of care as described above  Current Length of Stay: 3 day(s)    Current Patient Status: Inpatient   Certification Statement: The patient will continue to require additional inpatient hospital stay due to Intractable nausea and poor appetite with no pancreatic mass and liver metastases  Discharge Plan: He lives at [facility] in hopes to go back there    Code Status: Level 3 - DNAR and DNI      Subjective:   Still has nausea and loss of appetite  No abdominal pain  Tried to eat pancakes this morning however they were not good  No vomiting  No back pain  Objective:     Vitals:   Temp (24hrs), Av °F (37 2 °C), Min:98 7 °F (37 1 °C), Max:99 2 °F (37 3 °C)    HR:  [74-76] 76  Resp:  [18] 18  BP: (126-140)/(57-60) 126/58  SpO2:  [93 %-95 %] 95 %  Body mass index is 26 11 kg/m²  Input and Output Summary (last 24 hours): Intake/Output Summary (Last 24 hours) at 17 1102  Last data filed at 17 0349   Gross per 24 hour   Intake              550 ml   Output              300 ml   Net              250 ml       Physical Exam:     Physical Exam   Constitutional: He is oriented to person, place, and time  He appears well-nourished  No distress  HENT:   Head: Normocephalic     Eyes: Pupils are equal, round, and reactive to light  Neck: Normal range of motion  Neck supple  Cardiovascular: Normal rate, regular rhythm and normal heart sounds  No murmur heard  Pulmonary/Chest: Effort normal and breath sounds normal  No respiratory distress  He has no wheezes  Abdominal: Bowel sounds are normal    Musculoskeletal: Normal range of motion  Neurological: He is alert and oriented to person, place, and time  Skin: Skin is warm and dry  Psychiatric: He has a normal mood and affect  His behavior is normal  Thought content normal    Nursing note and vitals reviewed  Additional Data:     Labs:      Results from last 7 days  Lab Units 06/30/17  0525  06/25/17  2350   WBC Thousand/uL 3 94*  < > 7 06   HEMOGLOBIN g/dL 9 6*  < > 10 5*   HEMATOCRIT % 30 2*  < > 31 5*   PLATELETS Thousands/uL 104*  < > 157   NEUTROS PCT %  --   --  76*   LYMPHS PCT %  --   --  9*   MONOS PCT %  --   --  14*   EOS PCT %  --   --  1   < > = values in this interval not displayed  Results from last 7 days  Lab Units 06/30/17  0525   SODIUM mmol/L 140   POTASSIUM mmol/L 4 1   CHLORIDE mmol/L 107   CO2 mmol/L 26   BUN mg/dL 25   CREATININE mg/dL 1 58*   CALCIUM mg/dL 8 2*   TOTAL PROTEIN g/dL 5 6*   BILIRUBIN TOTAL mg/dL 0 45   ALK PHOS U/L 66   ALT U/L 11*   AST U/L 18   GLUCOSE RANDOM mg/dL 147*       Results from last 7 days  Lab Units 07/01/17  0453   INR  1 83*       * I Have Reviewed All Lab Data Listed Above        Recent Cultures (last 7 days):       Results from last 7 days  Lab Units 06/26/17  1039   GRAM STAIN RESULT  Rare Polys  No bacteria seen   BODY FLUID CULTURE, STERILE  No growth       Last 24 Hours Medication List:     aspirin 81 mg Oral Once per day on Mon Thu   bisacodyl 5 mg Oral BID   ferrous sulfate 325 mg Oral BID   fluticasone-salmeterol 1 puff Inhalation Q12H [facility]   guaiFENesin 600 mg Oral Q12H [address]   heparin (porcine) 5,000 Units Subcutaneous Q8H [address]   insulin aspart protamine-insulin aspart 14 Units Subcutaneous Early Morning   insulin aspart protamine-insulin aspart 6 Units Subcutaneous Before Dinner   insulin lispro 1-5 Units Subcutaneous HS   insulin lispro 1-6 Units Subcutaneous TID AC   mirtazapine 15 mg Oral HS   pantoprazole 40 mg Oral Early Morning   sotalol 80 mg Oral Daily   sucralfate 1,000 mg Oral Q6H [address]   warfarin 2 5 mg Oral Once per day on Sun Mon Wed Thu Fri Sat   zolpidem 5 mg Oral HS        Today, Patient Was Seen By: MEREDITH Spann    ** Please Note: Dragon 360 Dictation voice to text software may have been used in the creation of this document   **

## 2017-07-01 NOTE — ASSESSMENT & PLAN NOTE
Plan:   · GI consult/ input appreciated  · EGD showed hiatal hernia and multiple biopsies taken  · Results of the biopsies are pending at this time  · Started Remeron given loss of appetite     · Likely  secondary to pancreatic mass with metastatic disease  · Continue supportive care

## 2017-07-01 NOTE — ASSESSMENT & PLAN NOTE
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

## 2017-07-02 LAB
ALBUMIN SERPL BCP-MCNC: 2 G/DL (ref 3.5–5)
ALP SERPL-CCNC: 73 U/L (ref 46–116)
ALT SERPL W P-5'-P-CCNC: 18 U/L (ref 12–78)
ANION GAP SERPL CALCULATED.3IONS-SCNC: 5 MMOL/L (ref 4–13)
AST SERPL W P-5'-P-CCNC: 30 U/L (ref 5–45)
BILIRUB SERPL-MCNC: 0.43 MG/DL (ref 0.2–1)
BUN SERPL-MCNC: 28 MG/DL (ref 5–25)
CALCIUM SERPL-MCNC: 8.2 MG/DL (ref 8.3–10.1)
CHLORIDE SERPL-SCNC: 109 MMOL/L (ref 100–108)
CO2 SERPL-SCNC: 27 MMOL/L (ref 21–32)
CREAT SERPL-MCNC: 1.74 MG/DL (ref 0.6–1.3)
GFR SERPL CREATININE-BSD FRML MDRD: 37.2 ML/MIN/1.73SQ M
GLUCOSE SERPL-MCNC: 152 MG/DL (ref 65–140)
GLUCOSE SERPL-MCNC: 170 MG/DL (ref 65–140)
GLUCOSE SERPL-MCNC: 216 MG/DL (ref 65–140)
GLUCOSE SERPL-MCNC: 238 MG/DL (ref 65–140)
INR PPP: 1.86 (ref 0.86–1.16)
POTASSIUM SERPL-SCNC: 3.9 MMOL/L (ref 3.5–5.3)
PROT SERPL-MCNC: 5.6 G/DL (ref 6.4–8.2)
PROTHROMBIN TIME: 21.6 SECONDS (ref 12.1–14.4)
SODIUM SERPL-SCNC: 141 MMOL/L (ref 136–145)

## 2017-07-02 PROCEDURE — 82948 REAGENT STRIP/BLOOD GLUCOSE: CPT

## 2017-07-02 PROCEDURE — 80053 COMPREHEN METABOLIC PANEL: CPT | Performed by: NURSE PRACTITIONER

## 2017-07-02 PROCEDURE — 85610 PROTHROMBIN TIME: CPT | Performed by: NURSE PRACTITIONER

## 2017-07-02 RX ADMIN — GUAIFENESIN 600 MG: 600 TABLET, EXTENDED RELEASE ORAL at 20:03

## 2017-07-02 RX ADMIN — BISACODYL 5 MG: 5 TABLET, COATED ORAL at 17:58

## 2017-07-02 RX ADMIN — INSULIN LISPRO 1 UNITS: 100 INJECTION, SOLUTION INTRAVENOUS; SUBCUTANEOUS at 17:57

## 2017-07-02 RX ADMIN — SUCRALFATE 1000 MG: 1 SUSPENSION ORAL at 22:01

## 2017-07-02 RX ADMIN — Medication 325 MG: at 08:16

## 2017-07-02 RX ADMIN — HYDROCODONE BITARTRATE AND ACETAMINOPHEN 1 TABLET: 5; 325 TABLET ORAL at 22:01

## 2017-07-02 RX ADMIN — INSULIN ASPART 14 UNITS: 100 INJECTION, SUSPENSION SUBCUTANEOUS at 06:21

## 2017-07-02 RX ADMIN — INSULIN LISPRO 2 UNITS: 100 INJECTION, SOLUTION INTRAVENOUS; SUBCUTANEOUS at 12:16

## 2017-07-02 RX ADMIN — FLUTICASONE PROPIONATE AND SALMETEROL 1 PUFF: 50; 250 POWDER RESPIRATORY (INHALATION) at 08:16

## 2017-07-02 RX ADMIN — GUAIFENESIN 600 MG: 600 TABLET, EXTENDED RELEASE ORAL at 08:18

## 2017-07-02 RX ADMIN — ZOLPIDEM TARTRATE 5 MG: 5 TABLET, FILM COATED ORAL at 22:01

## 2017-07-02 RX ADMIN — Medication 325 MG: at 17:58

## 2017-07-02 RX ADMIN — SOTALOL HYDROCHLORIDE 80 MG: 80 TABLET ORAL at 08:16

## 2017-07-02 RX ADMIN — BISACODYL 5 MG: 5 TABLET, COATED ORAL at 08:16

## 2017-07-02 RX ADMIN — INSULIN ASPART 6 UNITS: 100 INJECTION, SUSPENSION SUBCUTANEOUS at 17:57

## 2017-07-02 RX ADMIN — SUCRALFATE 1000 MG: 1 SUSPENSION ORAL at 12:16

## 2017-07-02 RX ADMIN — PANTOPRAZOLE SODIUM 40 MG: 20 TABLET, DELAYED RELEASE ORAL at 06:22

## 2017-07-02 RX ADMIN — FLUTICASONE PROPIONATE AND SALMETEROL 1 PUFF: 50; 250 POWDER RESPIRATORY (INHALATION) at 22:02

## 2017-07-02 RX ADMIN — INSULIN LISPRO 1 UNITS: 100 INJECTION, SOLUTION INTRAVENOUS; SUBCUTANEOUS at 08:16

## 2017-07-02 RX ADMIN — SUCRALFATE 1000 MG: 1 SUSPENSION ORAL at 06:22

## 2017-07-02 RX ADMIN — INSULIN LISPRO 2 UNITS: 100 INJECTION, SOLUTION INTRAVENOUS; SUBCUTANEOUS at 22:03

## 2017-07-02 RX ADMIN — MIRTAZAPINE 15 MG: 15 TABLET, FILM COATED ORAL at 22:01

## 2017-07-02 RX ADMIN — SUCRALFATE 1000 MG: 1 SUSPENSION ORAL at 17:58

## 2017-07-02 NOTE — PROGRESS NOTES
Called by Yelena Crowley the patient's son to come to room to discuss the results of the Ct scan with the patient  The patient is fully aware of the results concerning for pancreatic cancer with liver and lung mets  He was given 2 options: 1  Hold coumadin and pursue biopsy, consult IR and heme/onc or 2  Assume this is cancer as it is behaving as such and do nothing, go home with palliative care  He and his sons have opted for biopsy and definitive diagnosis even if it means no treatment could be offered     Will hold coumadin, consult IR and heme/onc

## 2017-07-02 NOTE — ASSESSMENT & PLAN NOTE
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

## 2017-07-02 NOTE — CONSULTS
Consultation - Medical Oncology   Aida Elam 80 y o  male MRN: [id number]  Unit/Bed#: [facility] 512-85 Encounter: [id number]    History of Present Illness   Physician Requesting Consult: Lenka Gaviria DO  Reason for Consult / Principal Problem: Multiple liver metastasis are suspected, possibly a primary pancreatic neoplasm  HPI: Aida Elam is a 80y o  year old male who presents with indigestion an altered taste for food of 2 weeks duration  He resides in personal care and has been getting around with a wheelchair  He has significant arthritis of the knees which has limited his ability to walk  He notes easy bruising on long-term anticoagulation with warfarin, for management of atrial fibrillation and there is history of right lower extremity DVT  Ultrasound guided left thoracentesis was done on 2017: Approximately 525 mL of dark serosanguineous fluid was aspirated  Inpatient consult to Hematology  Consult performed by: [facility] ordered by: Barbara Chilmark:   General: Feels well, no chills or swaets  Head and Neck: No nosebleeds, no oral cavity or throat soreness  Cardiovascular: No chest pain, no lower extremity edema  Respriatory: No cough or dyspnea  GI: See history of present illness, bowel habits formed and regular  : No urinary frequency  Musculoskeletal: No back pains  He has significant arthritis of the knees limiting his ability to walk    Skin: No skin rash  Neurological: No headache, no numbness, no weakness  Hematologic: He notes easy bruising on long-term anticoagulation with warfarin  Psychiatric: No emotional problems    Historical Information   Past Medical History:   Diagnosis Date    Atrial fibrillation     CHF (congestive heart failure)     Chronic kidney disease (CKD), stage III (moderate)     Chronic venous hypertension with ulcer     Right    COPD (chronic obstructive pulmonary disease)     Diabetes mellitus type 2 in nonobese mirtazapine (REMERON) tablet 15 mg  15 mg Oral HS    ondansetron (ZOFRAN) injection 4 mg  4 mg Intravenous Q6H PRN    pantoprazole (PROTONIX) EC tablet 40 mg  40 mg Oral Early Morning    sotalol (BETAPACE) tablet 80 mg  80 mg Oral Daily    sucralfate (CARAFATE) oral suspension 1,000 mg  1,000 mg Oral Q6H [address]    zolpidem (AMBIEN) tablet 5 mg  5 mg Oral HS    and PTA meds:  Prescriptions Prior to Admission   Medication    aspirin 81 MG tablet    bisacodyl (DULCOLAX) 5 mg EC tablet    ferrous sulfate 325 (65 Fe) mg tablet    fluticasone furoate-vilanterol (BREO ELLIPTA)    furosemide (LASIX) 40 mg tablet    guaiFENesin (MUCINEX) 600 mg 12 hr tablet    HYDROcodone-acetaminophen (NORCO) 5-325 mg per tablet    insulin aspart protamine-insulin aspart (NovoLOG 70/30) 100 units/mL injection    insulin aspart protamine-insulin aspart (NovoLOG 70/30) 100 units/mL injection    pantoprazole (PROTONIX) 40 mg tablet    propranolol (INDERAL) 20 mg tablet    sotalol (BETAPACE) 80 mg tablet    warfarin (COUMADIN) 2 5 mg tablet    zolpidem (AMBIEN) 5 mg tablet     No Known Allergies    Objective   Vitals: Blood pressure 137/61, pulse 92, temperature 99 1 °F (37 3 °C), temperature source Oral, resp  rate 18, height 5' 9" (1 753 m), weight 80 2 kg (176 lb 12 9 oz), SpO2 94 %  Intake/Output Summary (Last 24 hours) at 07/02/17 1929  Last data filed at 07/02/17 1900   Gross per 24 hour   Intake             1080 ml   Output              300 ml   Net              780 ml     Invasive Devices     Peripheral Intravenous Line            Peripheral IV 06/29/17 Left Arm 3 days                Physical Exam: General appearance: Appears comfortable at rest  Head: Normocephalic  Eyes: Extraocular movements intact  Ears:  There is moderate hearing impairment  Oropharynx: Clear  Neck: Supple, No lymphadenopathy  Chest: No axillary adenopathy  Lungs: Clear to auscultation bilaterally  Heart: Heart rhythm is irregular  Abdomen: No hepatic or splenic enlargement; No inguinal  lymphadenopathy  Extremities: No lower extremity edema bilaterally  Skin: There is chronic venous stasis dermatitis of the distal lower extremities  There are faded bruises of the forearms bilaterally  Neurologic: Grossly intact, no focal neurological deficit(The patient was examined in bed and gait was not observed )  Psychiatric: Oriented to person, place and time, normal mood and affect    Lab Results:     From June 30, 2017: WBC is 3 94, hemoglobin 9 6 with MCV 94, platelets 910    From July 2, 2017:Creatinine is 1 74, AST 30, ALT 18, alkaline phosphatase 73, bili 0 43, PT/INR is 1 86    CT scan of the abdomen and pelvis with IV contrast from June 29, 2017: There are multiple liver lesions the largest measuring 2 4 and 2 2 cm, spleen is enlarged to 14 3 cm, there is upper abdominal adenopathy in the rose marie hepatis region measuring 4 1 x 2 4 cm, no pancreatic tissue is identified otherwise  CT the chest without IV contrast from June 28, 2017: There is a well circumscribed right lower lobe 1 9 to meter nodule, Likely a hammertoe, and a 5 mm right middle lobe nodule compatible with calcified granuloma, minimal left pleural effusion, calcified pleural plaques and laterally consistent with prior asbestos related disease, no mediastinal or hilar adenopathy  Assessment/Plan     Assessment:    #1 Liver metastasis are suspected, the largest are 2 4 and 2 2 cm, there is upper abdominal lymphadenopathy in the rose marie hepatis region measuring 4 1 x 2 4 cm, possibly a primary pancreatic lesion  Liver biopsy with CT guidance is planned in the near future when anticoagulation with warfarin is sufficiently reversed  #2 Left pleural effusion, Post left thoracentesis on June 26, 2017    #3 Chronic anticoagulation with warfarin for management of atrial fibrillation and history of right lower extremity DVT      #4 Pancytopenia, mild, possibly a result of hypersplenism noting splenomegaly on CT scan  A primary bone marrow disorder is to be considered  Plan:    Awaiting left pleural fluid cytology and biopsy results from the liver mass and/or rose marie hepatis region mass  Further evaluation of pancytopenia with iron studies, vitamin O-42 and folic acid levels  If pancytopenia remains unexplained then bone marrow examination may be helpful in this evaluation  Counseling / Coordination of Care  Total floor / unit time spent today 45 minutes  Greater than 50% of total time was spent with the patient and / or family counseling and / or coordination of care   A description of the counseling / coordination of care: Diagnostic test, impressions and recommendations were reviewed with the patient

## 2017-07-02 NOTE — PROGRESS NOTES
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   · Was able to connect with son Juani Petty late yesterday, he will discuss with his family and then let me know what they decide   · Would favor hospice care rather than biopsy but will await their decision   · Have not shared results of CT scan with patient yet and family appreciated this  · Will consider palliative care consult after talking with son        * Nausea   Assessment & Plan     Plan:   · GI consult/ input appreciated  · EGD showed hiatal hernia and multiple biopsies taken  · Results of the biopsies are pending at this time  · Started Remeron given loss of appetite  · Likely secondary to pancreatic mass with metastatic disease  · Continue supportive care         Liver lesion   Assessment & Plan      · Reviewed CAT scan of the abdomen and pelvis   · Patient may need biopsy but will discuss with the patient's son before ordering any further testing  · Is likely contributing to his nausea and lack of appetite  · Likely metastatic disease        Right lower lobe lung mass   Assessment & Plan      · Patient had thoracentesis which results were negative for malignancy however showed high percentage of lymphocytes  · Pulmonary input appreciated  · CAT scan of the A/P noted  · This is likely a malignant effusion         A-fib   Assessment & Plan     Plan:   · Rate controlled on sotalol    · INR slightly subtherapeutic   · Continue Coumadin 2 5 mg daily and recheck as needed             GERD (gastroesophageal reflux disease)   Assessment & Plan     Plan:   · With worsening nausea and loss of appetite over the past month  · Continue Protonix b i d and carafate  · GI input appreciated, s/p EGD  with gastritis and hiatal hernia · Surgery was consulted by GI regarding hiatal hernia however he is a poor surgical candidate so there will be no plan for surgical intervention at this time  · Continue diet as tolerated        DM2 (diabetes mellitus, type 2)   Assessment & Plan     Plan:   · Hemoglobin A1c 7 9  · Blood sugars reviewed  · Continue current insulin regimen   · Continue sliding scale insulin and Accu-Cheks  · Continue to monitor PO intake           VTE Pharmacologic Prophylaxis:   Pharmacologic: Warfarin (Coumadin)  Mechanical VTE Prophylaxis in Place: Yes    Patient Centered Rounds: I have performed bedside rounds with nursing staff today  Discussions with Specialists or Other Care Team Provider: Primary RN and CM    Education and Discussions with Family / Patient: Will have a meeting with son Michelle Jackson when he arrives  RN to let provider know of son's arrival     Time Spent for Care: 20 minutes  More than 50% of total time spent on counseling and coordination of care as described above  Current Length of Stay: 4 day(s)    Current Patient Status: Inpatient   Certification Statement: The patient will continue to require additional inpatient hospital stay due to nausea and new pancreatic mass with liver lesions which may require more work up  Discharge Plan: depends on family's decision  May be able to go back to [facility] and then transition to hospice care     Code Status: Level 3 - DNAR and DNI      Subjective:   Got the breakfast right this morning and it tasted pretty good  Was not able to eat all of it  No chest pain or shortness of breath  Still with intermittent nausea and no appetite  Does not have any abdominal pain  Objective:     Vitals:   Temp (24hrs), Av 8 °F (37 1 °C), Min:98 1 °F (36 7 °C), Max:99 3 °F (37 4 °C)    HR:  [68-85] 68  Resp:  [18] 18  BP: (116-119)/(49-57) 119/57  SpO2:  [90 %-92 %] 92 %  Body mass index is 26 11 kg/m²       Input and Output Summary (last 24 hours): Intake/Output Summary (Last 24 hours) at 07/02/17 1010  Last data filed at 07/02/17 5000   Gross per 24 hour   Intake              390 ml   Output              500 ml   Net             -110 ml       Physical Exam:     Physical Exam   Constitutional: He is oriented to person, place, and time  No distress  HENT:   Head: Normocephalic  Eyes: Pupils are equal, round, and reactive to light  Neck: Normal range of motion  Neck supple  Cardiovascular: Normal rate and regular rhythm  Pulmonary/Chest: Effort normal and breath sounds normal    Abdominal: Soft  Bowel sounds are normal  There is no tenderness  Musculoskeletal: Normal range of motion  He exhibits no edema  Neurological: He is alert and oriented to person, place, and time  Skin: He is not diaphoretic  There is pallor  Psychiatric: He has a normal mood and affect  His behavior is normal    Nursing note and vitals reviewed  Additional Data:     Labs:      Results from last 7 days  Lab Units 06/30/17  0525  06/25/17  2350   WBC Thousand/uL 3 94*  < > 7 06   HEMOGLOBIN g/dL 9 6*  < > 10 5*   HEMATOCRIT % 30 2*  < > 31 5*   PLATELETS Thousands/uL 104*  < > 157   NEUTROS PCT %  --   --  76*   LYMPHS PCT %  --   --  9*   MONOS PCT %  --   --  14*   EOS PCT %  --   --  1   < > = values in this interval not displayed  Results from last 7 days  Lab Units 07/02/17  0446   SODIUM mmol/L 141   POTASSIUM mmol/L 3 9   CHLORIDE mmol/L 109*   CO2 mmol/L 27   BUN mg/dL 28*   CREATININE mg/dL 1 74*   CALCIUM mg/dL 8 2*   TOTAL PROTEIN g/dL 5 6*   BILIRUBIN TOTAL mg/dL 0 43   ALK PHOS U/L 73   ALT U/L 18   AST U/L 30   GLUCOSE RANDOM mg/dL 170*       Results from last 7 days  Lab Units 07/02/17  0446   INR  1 86*       * I Have Reviewed All Lab Data Listed Above          Recent Cultures (last 7 days):       Results from last 7 days  Lab Units 06/26/17  1039   GRAM STAIN RESULT  Rare Polys  No bacteria seen   BODY FLUID CULTURE, STERILE  No growth Last 24 Hours Medication List:     aspirin 81 mg Oral Once per day on Mon Thu   bisacodyl 5 mg Oral BID   ferrous sulfate 325 mg Oral BID   fluticasone-salmeterol 1 puff Inhalation Q12H DEMIAN   guaiFENesin 600 mg Oral Q12H [facility]   insulin aspart protamine-insulin aspart 14 Units Subcutaneous Early Morning   insulin aspart protamine-insulin aspart 6 Units Subcutaneous Before Dinner   insulin lispro 1-5 Units Subcutaneous HS   insulin lispro 1-6 Units Subcutaneous TID AC   mirtazapine 15 mg Oral HS   pantoprazole 40 mg Oral Early Morning   sotalol 80 mg Oral Daily   sucralfate 1,000 mg Oral Q6H [facility]   warfarin 2 5 mg Oral Once per day on Sun Mon Wed Thu Fri Sat   zolpidem 5 mg Oral HS        Today, Patient Was Seen By: MEREDITH Rojas    ** Please Note: Dragon 360 Dictation voice to text software may have been used in the creation of this document   **

## 2017-07-02 NOTE — ASSESSMENT & PLAN NOTE
Endoscopy H&P    Procedure : Colonoscopy      asymptomatic screening exam      Past Medical History:   Diagnosis Date    Anxiety     Asthma     Cleft palate     Dermoid     Diabetes mellitus     Hyperlipidemia     Hypertension     Obesity     PONV (postoperative nausea and vomiting)              Review of Systems -ROS:  GENERAL: No fever, chills, fatigability or weight loss.  CHEST: Denies [place]GAD[place]O, cyanosis, wheezing, cough and sputum production.  CARDIOVASCULAR: Denies chest pain, PND, orthopnea or reduced exercise tolerance.   Musculoskeletal ROS: negative for - gait disturbance or joint pain  Neurological ROS: negative for - confusion or memory loss        Physical Exam:  General: well developed, well nourished, no distress  Head: normocephalic  Neck: supple, symmetrical, trachea midline  Lungs:  clear to auscultation bilaterally and normal respiratory effort  Heart: regular rate and rhythm, S1, S2 normal, no murmur, rub or gallop and regular rate and rhythm  Abdomen: soft, non-tender non-distented; bowel sounds normal; no masses,  no organomegaly  Extremities: no cyanosis or edema, or clubbing       Moderate Sedation (choice): Mallampati Score 1    ASA : II    IMP: asymptomatic screening exam    Plan: Colonoscopy with Moderate sedation.  I have explained the procedure including indications, alternatives, expected outcomes and potential complications. The patient appears to understand and gives informed consent. The patient is medically ready for surgery.         · Reviewed CAT scan of the abdomen and pelvis   · Patient may need biopsy but will discuss with the patient's son before ordering any further testing  · Is likely contributing to his nausea and lack of appetite    · Likely metastatic disease

## 2017-07-02 NOTE — ASSESSMENT & PLAN NOTE
Plan:   · Rate controlled on sotalol    · INR slightly subtherapeutic   · Continue Coumadin 2 5 mg daily and recheck as needed

## 2017-07-03 LAB
GLUCOSE SERPL-MCNC: 161 MG/DL (ref 65–140)
GLUCOSE SERPL-MCNC: 187 MG/DL (ref 65–140)
GLUCOSE SERPL-MCNC: 222 MG/DL (ref 65–140)
GLUCOSE SERPL-MCNC: 90 MG/DL (ref 65–140)
INR PPP: 2.09 (ref 0.86–1.16)
PROTHROMBIN TIME: 23.7 SECONDS (ref 12.1–14.4)

## 2017-07-03 PROCEDURE — 82948 REAGENT STRIP/BLOOD GLUCOSE: CPT

## 2017-07-03 PROCEDURE — 85610 PROTHROMBIN TIME: CPT | Performed by: NURSE PRACTITIONER

## 2017-07-03 RX ORDER — PANTOPRAZOLE SODIUM 40 MG/1
40 TABLET, DELAYED RELEASE ORAL
Status: DISCONTINUED | OUTPATIENT
Start: 2017-07-03 | End: 2017-07-08 | Stop reason: HOSPADM

## 2017-07-03 RX ADMIN — Medication 325 MG: at 17:03

## 2017-07-03 RX ADMIN — PANTOPRAZOLE SODIUM 40 MG: 20 TABLET, DELAYED RELEASE ORAL at 06:08

## 2017-07-03 RX ADMIN — INSULIN LISPRO 1 UNITS: 100 INJECTION, SOLUTION INTRAVENOUS; SUBCUTANEOUS at 17:04

## 2017-07-03 RX ADMIN — MIRTAZAPINE 15 MG: 15 TABLET, FILM COATED ORAL at 21:53

## 2017-07-03 RX ADMIN — Medication 325 MG: at 08:03

## 2017-07-03 RX ADMIN — FLUTICASONE PROPIONATE AND SALMETEROL 1 PUFF: 50; 250 POWDER RESPIRATORY (INHALATION) at 21:53

## 2017-07-03 RX ADMIN — INSULIN LISPRO 1 UNITS: 100 INJECTION, SOLUTION INTRAVENOUS; SUBCUTANEOUS at 08:03

## 2017-07-03 RX ADMIN — BISACODYL 5 MG: 5 TABLET, COATED ORAL at 17:04

## 2017-07-03 RX ADMIN — INSULIN ASPART 6 UNITS: 100 INJECTION, SUSPENSION SUBCUTANEOUS at 17:05

## 2017-07-03 RX ADMIN — SUCRALFATE 1000 MG: 1 SUSPENSION ORAL at 23:53

## 2017-07-03 RX ADMIN — SUCRALFATE 1000 MG: 1 SUSPENSION ORAL at 17:04

## 2017-07-03 RX ADMIN — BISACODYL 5 MG: 5 TABLET, COATED ORAL at 08:03

## 2017-07-03 RX ADMIN — SUCRALFATE 1000 MG: 1 SUSPENSION ORAL at 06:08

## 2017-07-03 RX ADMIN — HYDROCODONE BITARTRATE AND ACETAMINOPHEN 1 TABLET: 5; 325 TABLET ORAL at 21:53

## 2017-07-03 RX ADMIN — SOTALOL HYDROCHLORIDE 80 MG: 80 TABLET ORAL at 08:03

## 2017-07-03 RX ADMIN — ASPIRIN 81 MG 81 MG: 81 TABLET ORAL at 08:03

## 2017-07-03 RX ADMIN — INSULIN LISPRO 1 UNITS: 100 INJECTION, SOLUTION INTRAVENOUS; SUBCUTANEOUS at 21:53

## 2017-07-03 RX ADMIN — SUCRALFATE 1000 MG: 1 SUSPENSION ORAL at 11:45

## 2017-07-03 RX ADMIN — FLUTICASONE PROPIONATE AND SALMETEROL 1 PUFF: 50; 250 POWDER RESPIRATORY (INHALATION) at 08:03

## 2017-07-03 RX ADMIN — ZOLPIDEM TARTRATE 5 MG: 5 TABLET, FILM COATED ORAL at 21:53

## 2017-07-03 RX ADMIN — GUAIFENESIN 600 MG: 600 TABLET, EXTENDED RELEASE ORAL at 08:03

## 2017-07-03 RX ADMIN — INSULIN ASPART 14 UNITS: 100 INJECTION, SUSPENSION SUBCUTANEOUS at 06:09

## 2017-07-03 RX ADMIN — PANTOPRAZOLE SODIUM 40 MG: 40 TABLET, DELAYED RELEASE ORAL at 17:03

## 2017-07-03 NOTE — PROGRESS NOTES
I discussed EGD biopsies with Javon Trent  Gastric biopsies showed chronic gastritis but no H  pylori or intestinal metaplasia  Duodenal biopsies unremarkable  Recommend continuing Protonix 40 mg twice daily

## 2017-07-03 NOTE — CASE MANAGEMENT
Continued Stay Review    Date: 7/3    Vital Signs: /68   Pulse 68   Temp 98 °F (36 7 °C) (Oral)   Resp 18   Ht 5' 9" (1 753 m) Comment: Stated   Wt 80 2 kg (176 lb 12 9 oz)   SpO2 96%   BMI 26 11 kg/m²     Medications:   Scheduled Meds:   aspirin 81 mg Oral Once per day on Mon Thu   bisacodyl 5 mg Oral BID   ferrous sulfate 325 mg Oral BID   fluticasone-salmeterol 1 puff Inhalation Q12H [address]   insulin aspart protamine-insulin aspart 14 Units Subcutaneous Early Morning   insulin aspart protamine-insulin aspart 6 Units Subcutaneous Before Dinner   insulin lispro 1-5 Units Subcutaneous HS   insulin lispro 1-6 Units Subcutaneous TID AC   mirtazapine 15 mg Oral HS   pantoprazole 40 mg Oral BID AC   sotalol 80 mg Oral Daily   sucralfate 1,000 mg Oral Q6H [address]   zolpidem 5 mg Oral HS     Continuous Infusions:    PRN Meds:   acetaminophen    aluminum-magnesium hydroxide-simethicone    HYDROcodone-acetaminophen    ondansetron    Abnormal Labs/Diagnostic Results:   : 07/03/17 0700        Protime 23 7 (H) 12 1 - 14 4 seconds     INR 2 09 (H) 0 86 - 1 16      Age/Sex: 80 y o  male     Assessment/Plan:   Assessment:     Principal Problem:    Nausea  Active Problems:    DM2 (diabetes mellitus, type 2)    GERD (gastroesophageal reflux disease)    Loss of appetite    Pleural effusion    A-fib    Right lower lobe lung mass    Liver lesion    Pancreatic mass      Plan:     · Abdominal mass with multiple liver lesions suspect malignancy  - await IR biopsy, likely 7/5 once INR trends down  Heme/onc input appreciated  · Nausea and dyspepsia/GERD s/p EGD showing hiatal hernia, biopsies showing chronic gastritis  - trial increase PPI to BID as patients symptoms tend to be worse at night  · RLL lung mass and effusion s/p thoracentesis - pleural fluid negative for malignancy, but still suspected malignant effusion   Pulmonary signed off  · Atrial fibrillation - coumadin on hold for biopsy  · DM type 2 HbA1c 7 9 - continue insulin  · Poor appetite - remeron added         VTE Pharmacologic Prophylaxis:   Pharmacologic: Warfarin (Coumadin)  Mechanical VTE Prophylaxis in Place:  Yes      Current Length of Stay: 5 day(s)     Current Patient Status: Inpatient   Certification Statement: The patient will continue to require additional inpatient hospital stay due to Pending biopsy    Discharge Plan: Pending

## 2017-07-03 NOTE — PROGRESS NOTES
[facility] Internal Medicine Progress Note  Patient: David Or 80 y o  male   MRN: [id number]  PCP: Dominick Coto MD  Unit/Bed#: [facility] 713-01 Encounter: [id number]  Date Of Visit: 07/03/17    Assessment:    Principal Problem:    Nausea  Active Problems:    DM2 (diabetes mellitus, type 2)    GERD (gastroesophageal reflux disease)    Loss of appetite    Pleural effusion    A-fib    Right lower lobe lung mass    Liver lesion    Pancreatic mass      Plan:    · Abdominal mass with multiple liver lesions suspect malignancy  - await IR biopsy, likely 7/5 once INR trends down  Heme/onc input appreciated  · Nausea and dyspepsia/GERD s/p EGD showing hiatal hernia, biopsies showing chronic gastritis  - trial increase PPI to BID as patients symptoms tend to be worse at night  · RLL lung mass and effusion s/p thoracentesis - pleural fluid negative for malignancy, but still suspected malignant effusion  Pulmonary signed off  · Atrial fibrillation - coumadin on hold for biopsy  · DM type 2 HbA1c 7 9 - continue insulin  · Poor appetite - remeron added  VTE Pharmacologic Prophylaxis:   Pharmacologic: Warfarin (Coumadin)  Mechanical VTE Prophylaxis in Place: Yes    Patient Centered Rounds: I have performed bedside rounds with nursing staff today  Discussions with Specialists or Other Care Team Provider: IR, case management    Education and Discussions with Family / Patient: Patient  Son called with update    Time Spent for Care: 30 minutes  More than 50% of total time spent on counseling and coordination of care as described above  Current Length of Stay: 5 day(s)    Current Patient Status: Inpatient   Certification Statement: The patient will continue to require additional inpatient hospital stay due to Pending biopsy    Discharge Plan / Estimated Discharge Date: Patient from [facility] Status: Level 3 - DNAR and DNI      Subjective:   Still with dyspepsia and poor appetite   Dyspepsia worse at night     Objective:     Vitals:   Temp (24hrs), Av 5 °F (36 9 °C), Min:98 °F (36 7 °C), Max:99 1 °F (37 3 °C)    HR:  [68-92] 68  Resp:  [18] 18  BP: (107-137)/(50-68) 133/68  SpO2:  [92 %-96 %] 96 %  Body mass index is 26 11 kg/m²  Input and Output Summary (last 24 hours): Intake/Output Summary (Last 24 hours) at 17 1003  Last data filed at 17 0249   Gross per 24 hour   Intake              720 ml   Output              200 ml   Net              520 ml       Physical Exam:     Physical Exam   Constitutional: He is oriented to person, place, and time  He appears well-developed  No distress  HENT:   Head: Normocephalic and atraumatic  Neck: Normal range of motion  Neck supple  Cardiovascular: Normal rate and regular rhythm  No murmur heard  Pulmonary/Chest: Effort normal and breath sounds normal  No respiratory distress  He has no wheezes  He has no rales  Abdominal: Soft  Bowel sounds are normal  He exhibits no distension  Musculoskeletal: He exhibits no edema  Neurological: He is alert and oriented to person, place, and time  No cranial nerve deficit  Skin: Skin is warm and dry  No rash noted  Psychiatric: He has a normal mood and affect  Additional Data:     Labs:      Results from last 7 days  Lab Units 17  0525   WBC Thousand/uL 3 94*   HEMOGLOBIN g/dL 9 6*   HEMATOCRIT % 30 2*   PLATELETS Thousands/uL 104*       Results from last 7 days  Lab Units 17  0446   SODIUM mmol/L 141   POTASSIUM mmol/L 3 9   CHLORIDE mmol/L 109*   CO2 mmol/L 27   BUN mg/dL 28*   CREATININE mg/dL 1 74*   CALCIUM mg/dL 8 2*   TOTAL PROTEIN g/dL 5 6*   BILIRUBIN TOTAL mg/dL 0 43   ALK PHOS U/L 73   ALT U/L 18   AST U/L 30   GLUCOSE RANDOM mg/dL 170*       Results from last 7 days  Lab Units 17  0556   INR  2 09*       * I Have Reviewed All Lab Data Listed Above  * Additional Pertinent Lab Tests Reviewed:  [facility] Admission Reviewed    Imaging:    Imaging Reports Reviewed Today Include: CT abd/pelvis  Imaging Personally Reviewed by Myself Includes:  none    Recent Cultures (last 7 days):       Results from last 7 days  Lab Units 06/26/17  1039   GRAM STAIN RESULT  Rare Polys  No bacteria seen   BODY FLUID CULTURE, STERILE  No growth       Last 24 Hours Medication List:     aspirin 81 mg Oral Once per day on Mon Thu   bisacodyl 5 mg Oral BID   ferrous sulfate 325 mg Oral BID   fluticasone-salmeterol 1 puff Inhalation Q12H [facility]   insulin aspart protamine-insulin aspart 14 Units Subcutaneous Early Morning   insulin aspart protamine-insulin aspart 6 Units Subcutaneous Before Dinner   insulin lispro 1-5 Units Subcutaneous HS   insulin lispro 1-6 Units Subcutaneous TID AC   mirtazapine 15 mg Oral HS   pantoprazole 40 mg Oral BID AC   sotalol 80 mg Oral Daily   sucralfate 1,000 mg Oral Q6H DEMIAN   zolpidem 5 mg Oral HS        Today, Patient Was Seen By: Demetri Whiteside PA-C    ** Please Note: This note has been constructed using a voice recognition system   **

## 2017-07-04 LAB
FOLATE SERPL-MCNC: 10.7 NG/ML (ref 3.1–17.5)
GLUCOSE SERPL-MCNC: 109 MG/DL (ref 65–140)
GLUCOSE SERPL-MCNC: 114 MG/DL (ref 65–140)
GLUCOSE SERPL-MCNC: 182 MG/DL (ref 65–140)
GLUCOSE SERPL-MCNC: 234 MG/DL (ref 65–140)
INR PPP: 2.06 (ref 0.86–1.16)
IRON SATN MFR SERPL: 11 %
IRON SERPL-MCNC: 20 UG/DL (ref 65–175)
PROTHROMBIN TIME: 23.4 SECONDS (ref 12.1–14.4)
TIBC SERPL-MCNC: 179 UG/DL (ref 250–450)
VIT B12 SERPL-MCNC: 857 PG/ML (ref 100–900)

## 2017-07-04 PROCEDURE — 82948 REAGENT STRIP/BLOOD GLUCOSE: CPT

## 2017-07-04 PROCEDURE — 85610 PROTHROMBIN TIME: CPT | Performed by: PHYSICIAN ASSISTANT

## 2017-07-04 PROCEDURE — 82607 VITAMIN B-12: CPT | Performed by: INTERNAL MEDICINE

## 2017-07-04 PROCEDURE — 83550 IRON BINDING TEST: CPT | Performed by: INTERNAL MEDICINE

## 2017-07-04 PROCEDURE — 83540 ASSAY OF IRON: CPT | Performed by: INTERNAL MEDICINE

## 2017-07-04 PROCEDURE — 82746 ASSAY OF FOLIC ACID SERUM: CPT | Performed by: INTERNAL MEDICINE

## 2017-07-04 RX ORDER — ACETAMINOPHEN 325 MG/1
650 TABLET ORAL EVERY 8 HOURS SCHEDULED
Status: DISCONTINUED | OUTPATIENT
Start: 2017-07-04 | End: 2017-07-08 | Stop reason: HOSPADM

## 2017-07-04 RX ORDER — INSULIN ASPART 100 [IU]/ML
7 INJECTION, SUSPENSION SUBCUTANEOUS
Status: DISCONTINUED | OUTPATIENT
Start: 2017-07-05 | End: 2017-07-08 | Stop reason: HOSPADM

## 2017-07-04 RX ADMIN — SOTALOL HYDROCHLORIDE 80 MG: 80 TABLET ORAL at 08:26

## 2017-07-04 RX ADMIN — INSULIN ASPART 6 UNITS: 100 INJECTION, SUSPENSION SUBCUTANEOUS at 16:58

## 2017-07-04 RX ADMIN — SUCRALFATE 1000 MG: 1 SUSPENSION ORAL at 23:38

## 2017-07-04 RX ADMIN — INSULIN LISPRO 1 UNITS: 100 INJECTION, SOLUTION INTRAVENOUS; SUBCUTANEOUS at 08:27

## 2017-07-04 RX ADMIN — SUCRALFATE 1000 MG: 1 SUSPENSION ORAL at 06:46

## 2017-07-04 RX ADMIN — ZOLPIDEM TARTRATE 5 MG: 5 TABLET, FILM COATED ORAL at 22:01

## 2017-07-04 RX ADMIN — Medication 325 MG: at 08:26

## 2017-07-04 RX ADMIN — INSULIN ASPART 14 UNITS: 100 INJECTION, SUSPENSION SUBCUTANEOUS at 06:46

## 2017-07-04 RX ADMIN — BISACODYL 5 MG: 5 TABLET, COATED ORAL at 17:02

## 2017-07-04 RX ADMIN — ACETAMINOPHEN 650 MG: 325 TABLET, FILM COATED ORAL at 22:01

## 2017-07-04 RX ADMIN — Medication 325 MG: at 17:02

## 2017-07-04 RX ADMIN — PANTOPRAZOLE SODIUM 40 MG: 40 TABLET, DELAYED RELEASE ORAL at 16:58

## 2017-07-04 RX ADMIN — HYDROCODONE BITARTRATE AND ACETAMINOPHEN 1 TABLET: 5; 325 TABLET ORAL at 22:01

## 2017-07-04 RX ADMIN — FLUTICASONE PROPIONATE AND SALMETEROL 1 PUFF: 50; 250 POWDER RESPIRATORY (INHALATION) at 22:00

## 2017-07-04 RX ADMIN — SUCRALFATE 1000 MG: 1 SUSPENSION ORAL at 11:25

## 2017-07-04 RX ADMIN — HYDROCODONE BITARTRATE AND ACETAMINOPHEN 1 TABLET: 5; 325 TABLET ORAL at 13:03

## 2017-07-04 RX ADMIN — MIRTAZAPINE 15 MG: 15 TABLET, FILM COATED ORAL at 22:02

## 2017-07-04 RX ADMIN — PANTOPRAZOLE SODIUM 40 MG: 40 TABLET, DELAYED RELEASE ORAL at 06:46

## 2017-07-04 RX ADMIN — BISACODYL 5 MG: 5 TABLET, COATED ORAL at 08:26

## 2017-07-04 RX ADMIN — INSULIN LISPRO 2 UNITS: 100 INJECTION, SOLUTION INTRAVENOUS; SUBCUTANEOUS at 22:02

## 2017-07-04 RX ADMIN — FLUTICASONE PROPIONATE AND SALMETEROL 1 PUFF: 50; 250 POWDER RESPIRATORY (INHALATION) at 08:26

## 2017-07-04 RX ADMIN — SUCRALFATE 1000 MG: 1 SUSPENSION ORAL at 17:02

## 2017-07-04 NOTE — PROGRESS NOTES
[facility] Internal Medicine Progress Note  Patient: Maximilian Nolan 80 y o  male   MRN: [id number]  PCP: Jp Moses MD  Unit/Bed#: [facility] 473-63 Encounter: [id number]  Date Of Visit: 07/04/17    Assessment:    Principal Problem:    Nausea  Active Problems:    DM2 (diabetes mellitus, type 2)    GERD (gastroesophageal reflux disease)    Loss of appetite    Pleural effusion    A-fib    Right lower lobe lung mass    Liver lesion    Pancreatic mass      Plan:    · Abdominal mass with multiple liver lesions suspect malignancy-await IR biopsy  Tentative biopsy 7/5  Coumadin on hold  Heme/on input appreciated  · Nausea and dyspepsia/GERD status post EGD showing hiatal hernia, biopsies showing chronic gastritis-continue PPI BID  Suspect patient's symptoms are more from above than patient's GERD  · Right lower lobe lung mass and effusion s/p thoracentesis-pleural fluid negative for malignancy, but still suspect malignant effusion  Pulmonary signed off  · Atrial fibrillation-Coumadin on hold for biopsy  · DM type II with a hemoglobin A1c is 7 9-continue insulin  Decrease am dose of 70/30 by 50% since patient will be NPO after midnight  · Poor appetite-Remeron added  Nutritional supplements at bedside  VTE Pharmacologic Prophylaxis:   Pharmacologic: Warfarin (Coumadin)  Mechanical VTE Prophylaxis in Place: Yes    Patient Centered Rounds: I have performed bedside rounds with nursing staff today  Discussions with Specialists or Other Care Team Provider:     Education and Discussions with Family / Patient: patient  Message left for son with update  Time Spent for Care: 30 minutes  More than 50% of total time spent on counseling and coordination of care as described above      Current Length of Stay: 6 day(s)    Current Patient Status: Inpatient   Certification Statement: The patient will continue to require additional inpatient hospital stay due to Abdominal mass biopsy    Discharge Plan / Estimated Discharge Date:     Code Status: Level 3 - DNAR and DNI      Subjective:   Still with abdominal discomfort  Objective:     Vitals:   Temp (24hrs), Av 5 °F (36 9 °C), Min:98 1 °F (36 7 °C), Max:99 3 °F (37 4 °C)    HR:  [64-69] 69  Resp:  [18] 18  BP: ()/(44-64) 96/64  SpO2:  [96 %-99 %] 96 %  Body mass index is 26 11 kg/m²  Input and Output Summary (last 24 hours): Intake/Output Summary (Last 24 hours) at 17 0914  Last data filed at 17 0700   Gross per 24 hour   Intake              600 ml   Output                0 ml   Net              600 ml       Physical Exam:     Physical Exam   Constitutional: He is oriented to person, place, and time  He appears well-developed  No distress  HENT:   Head: Normocephalic and atraumatic  Neck: Normal range of motion  Neck supple  Cardiovascular: Normal rate and regular rhythm  Murmur heard  Pulmonary/Chest: Effort normal and breath sounds normal  No respiratory distress  He has no wheezes  He has no rales  Abdominal: Soft  Bowel sounds are normal  He exhibits no distension  Musculoskeletal: He exhibits no edema  Neurological: He is alert and oriented to person, place, and time  No cranial nerve deficit  Skin: Skin is warm and dry  No rash noted  Psychiatric: He has a normal mood and affect  Additional Data:     Labs:      Results from last 7 days  Lab Units 17  0525   WBC Thousand/uL 3 94*   HEMOGLOBIN g/dL 9 6*   HEMATOCRIT % 30 2*   PLATELETS Thousands/uL 104*       Results from last 7 days  Lab Units 17  0446   SODIUM mmol/L 141   POTASSIUM mmol/L 3 9   CHLORIDE mmol/L 109*   CO2 mmol/L 27   BUN mg/dL 28*   CREATININE mg/dL 1 74*   CALCIUM mg/dL 8 2*   TOTAL PROTEIN g/dL 5 6*   BILIRUBIN TOTAL mg/dL 0 43   ALK PHOS U/L 73   ALT U/L 18   AST U/L 30   GLUCOSE RANDOM mg/dL 170*       Results from last 7 days  Lab Units 17  0515   INR  2 06*       * I Have Reviewed All Lab Data Listed Above    * Additional Pertinent Lab Tests Reviewed: All Labs Within Last 24 Hours Reviewed    Imaging:    Imaging Reports Reviewed Today Include: none  Imaging Personally Reviewed by Myself Includes:  none    Recent Cultures (last 7 days):           Last 24 Hours Medication List:     aspirin 81 mg Oral Once per day on Mon Thu   bisacodyl 5 mg Oral BID   ferrous sulfate 325 mg Oral BID   fluticasone-salmeterol 1 puff Inhalation Q12H [facility]   insulin aspart protamine-insulin aspart 14 Units Subcutaneous Early Morning   insulin aspart protamine-insulin aspart 6 Units Subcutaneous Before Dinner   insulin lispro 1-5 Units Subcutaneous HS   insulin lispro 1-6 Units Subcutaneous TID AC   mirtazapine 15 mg Oral HS   pantoprazole 40 mg Oral BID AC   sotalol 80 mg Oral Daily   sucralfate 1,000 mg Oral Q6H DEMIAN   zolpidem 5 mg Oral HS        Today, Patient Was Seen By: Wendy Banegas PA-C    ** Please Note: This note has been constructed using a voice recognition system   **

## 2017-07-04 NOTE — PLAN OF CARE
DISCHARGE PLANNING - CARE MANAGEMENT     Discharge to post-acute care or home with appropriate resources Progressing        GASTROINTESTINAL - ADULT     Minimal or absence of nausea and/or vomiting Progressing     Maintains adequate nutritional intake Progressing        Nutrition/Hydration-ADULT     Nutrient/Hydration intake appropriate for improving, restoring or maintaining nutritional needs Progressing        Potential for Falls     Patient will remain free of falls Progressing        Prexisting or High Potential for Compromised Skin Integrity     Skin integrity is maintained or improved Progressing        RESPIRATORY - ADULT     Achieves optimal ventilation and oxygenation Progressing

## 2017-07-05 LAB
ANION GAP SERPL CALCULATED.3IONS-SCNC: 7 MMOL/L (ref 4–13)
BUN SERPL-MCNC: 37 MG/DL (ref 5–25)
CALCIUM SERPL-MCNC: 8.8 MG/DL (ref 8.3–10.1)
CHLORIDE SERPL-SCNC: 109 MMOL/L (ref 100–108)
CO2 SERPL-SCNC: 25 MMOL/L (ref 21–32)
CREAT SERPL-MCNC: 1.94 MG/DL (ref 0.6–1.3)
GFR SERPL CREATININE-BSD FRML MDRD: 32.8 ML/MIN/1.73SQ M
GLUCOSE SERPL-MCNC: 133 MG/DL (ref 65–140)
GLUCOSE SERPL-MCNC: 174 MG/DL (ref 65–140)
GLUCOSE SERPL-MCNC: 188 MG/DL (ref 65–140)
GLUCOSE SERPL-MCNC: 225 MG/DL (ref 65–140)
GLUCOSE SERPL-MCNC: 83 MG/DL (ref 65–140)
INR PPP: 1.85 (ref 0.86–1.16)
POTASSIUM SERPL-SCNC: 4.2 MMOL/L (ref 3.5–5.3)
PROTHROMBIN TIME: 21.5 SECONDS (ref 12.1–14.4)
SODIUM SERPL-SCNC: 141 MMOL/L (ref 136–145)

## 2017-07-05 PROCEDURE — 80048 BASIC METABOLIC PNL TOTAL CA: CPT | Performed by: PHYSICIAN ASSISTANT

## 2017-07-05 PROCEDURE — 85610 PROTHROMBIN TIME: CPT | Performed by: PHYSICIAN ASSISTANT

## 2017-07-05 PROCEDURE — 82948 REAGENT STRIP/BLOOD GLUCOSE: CPT

## 2017-07-05 RX ORDER — OXYCODONE HYDROCHLORIDE 5 MG/1
2.5 TABLET ORAL EVERY 4 HOURS PRN
Status: DISCONTINUED | OUTPATIENT
Start: 2017-07-05 | End: 2017-07-08 | Stop reason: HOSPADM

## 2017-07-05 RX ORDER — OXYCODONE HYDROCHLORIDE 5 MG/1
2.5 TABLET ORAL 2 TIMES DAILY
Status: DISCONTINUED | OUTPATIENT
Start: 2017-07-05 | End: 2017-07-08 | Stop reason: HOSPADM

## 2017-07-05 RX ADMIN — ACETAMINOPHEN 650 MG: 325 TABLET, FILM COATED ORAL at 21:47

## 2017-07-05 RX ADMIN — INSULIN LISPRO 1 UNITS: 100 INJECTION, SOLUTION INTRAVENOUS; SUBCUTANEOUS at 16:38

## 2017-07-05 RX ADMIN — Medication 325 MG: at 08:33

## 2017-07-05 RX ADMIN — INSULIN ASPART 7 UNITS: 100 INJECTION, SUSPENSION SUBCUTANEOUS at 06:48

## 2017-07-05 RX ADMIN — BISACODYL 5 MG: 5 TABLET, COATED ORAL at 08:33

## 2017-07-05 RX ADMIN — INSULIN ASPART 6 UNITS: 100 INJECTION, SUSPENSION SUBCUTANEOUS at 16:39

## 2017-07-05 RX ADMIN — ZOLPIDEM TARTRATE 5 MG: 5 TABLET, FILM COATED ORAL at 21:47

## 2017-07-05 RX ADMIN — PANTOPRAZOLE SODIUM 40 MG: 40 TABLET, DELAYED RELEASE ORAL at 06:05

## 2017-07-05 RX ADMIN — SUCRALFATE 1000 MG: 1 SUSPENSION ORAL at 06:06

## 2017-07-05 RX ADMIN — SUCRALFATE 1000 MG: 1 SUSPENSION ORAL at 11:43

## 2017-07-05 RX ADMIN — INSULIN LISPRO 2 UNITS: 100 INJECTION, SOLUTION INTRAVENOUS; SUBCUTANEOUS at 08:33

## 2017-07-05 RX ADMIN — FLUTICASONE PROPIONATE AND SALMETEROL 1 PUFF: 50; 250 POWDER RESPIRATORY (INHALATION) at 21:51

## 2017-07-05 RX ADMIN — FLUTICASONE PROPIONATE AND SALMETEROL 1 PUFF: 50; 250 POWDER RESPIRATORY (INHALATION) at 08:33

## 2017-07-05 RX ADMIN — SOTALOL HYDROCHLORIDE 80 MG: 80 TABLET ORAL at 08:33

## 2017-07-05 RX ADMIN — Medication 325 MG: at 17:55

## 2017-07-05 RX ADMIN — ACETAMINOPHEN 650 MG: 325 TABLET, FILM COATED ORAL at 06:05

## 2017-07-05 RX ADMIN — OXYCODONE HYDROCHLORIDE 2.5 MG: 5 TABLET ORAL at 21:47

## 2017-07-05 RX ADMIN — PANTOPRAZOLE SODIUM 40 MG: 40 TABLET, DELAYED RELEASE ORAL at 16:38

## 2017-07-05 RX ADMIN — SUCRALFATE 1000 MG: 1 SUSPENSION ORAL at 23:51

## 2017-07-05 RX ADMIN — SUCRALFATE 1000 MG: 1 SUSPENSION ORAL at 17:55

## 2017-07-05 RX ADMIN — OXYCODONE HYDROCHLORIDE 2.5 MG: 5 TABLET ORAL at 10:00

## 2017-07-05 RX ADMIN — BISACODYL 5 MG: 5 TABLET, COATED ORAL at 17:55

## 2017-07-05 RX ADMIN — ACETAMINOPHEN 650 MG: 325 TABLET, FILM COATED ORAL at 13:05

## 2017-07-05 RX ADMIN — MIRTAZAPINE 15 MG: 15 TABLET, FILM COATED ORAL at 21:47

## 2017-07-05 NOTE — PROGRESS NOTES
[facility] Progress Note  Patient: María Cortes 80 y o  male   MRN: [id number]  PCP: Mandy Guardado MD  Unit/Bed#: [facility] 431-87 Encounter: [id number]  Date Of Visit: 07/05/17    Assessment:    Principal Problem:    Nausea  Active Problems:    DM2 (diabetes mellitus, type 2)    GERD (gastroesophageal reflux disease)    Loss of appetite    Pleural effusion    A-fib    Right lower lobe lung mass    Liver lesion    Pancreatic mass      Plan:    · Abdominal mass with multiple liver lesions suspect malignancy-await IR biopsy  Tentative biopsy 7/6  Coumadin on hold and trending down  Heme/onc input appreciated  · Nausea and dyspepsia/GERD status post EGD showing hiatal hernia, biopsies showing chronic gastritis-continue PPI b i d  Patient still feels symptomatic with abdominal discomfort  I tried Tylenol around the clock starting yesterday with little relief  Patient states he finds most relief with the Norco that he takes at night  We will trial low-dose oxycodone b i d  suspect pain from abdominal mass  · Right lower lobe lung mass and effusion status post thoracentesis-pleural fluid negative for malignancy, but still suspect malignant effusion  Pulmonary signed off  · Atrial fibrillation-Coumadin on hold for biopsy  · DM type II with hemoglobin A1c of 7 9 - a m  dose of 70/30 decreased by 50% as patient n p o  after midnight for biopsy  · Poor appetite-Remeron added  Nutritional supplements at bedside  VTE Pharmacologic Prophylaxis:   Pharmacologic: Warfarin (Coumadin)  Mechanical VTE Prophylaxis in Place: Yes    Patient Centered Rounds: I have performed bedside rounds with nursing staff today  Discussions with Specialists or Other Care Team Provider: Case management    Education and Discussions with Family / Patient: Patient  Son called with update  Time Spent for Care: 30 minutes    More than 50% of total time spent on counseling and coordination of care as described above     Current Length of Stay: 7 day(s)    Current Patient Status: Inpatient   Certification Statement: The patient will continue to require additional inpatient hospital stay due to IR biopsy    Discharge Plan / Estimated Discharge Date:  or     Code Status: Level 3 - DNAR and DNI      Subjective:   Still with abdominal discomfort  Not much improved with tylenol  Objective:     Vitals:   Temp (24hrs), Av 6 °F (37 °C), Min:98 °F (36 7 °C), Max:99 2 °F (37 3 °C)    HR:  [62-72] 72  Resp:  [18] 18  BP: (100-135)/(51-68) 135/68  SpO2:  [92 %-94 %] 92 %  Body mass index is 26 11 kg/m²  Input and Output Summary (last 24 hours): Intake/Output Summary (Last 24 hours) at 17 1026  Last data filed at 17 0701   Gross per 24 hour   Intake              360 ml   Output              300 ml   Net               60 ml       Physical Exam:     Physical Exam   Constitutional: He is oriented to person, place, and time  He appears well-developed  No distress  HENT:   Head: Normocephalic and atraumatic  Neck: Normal range of motion  Neck supple  Cardiovascular: Normal rate and regular rhythm  Murmur heard  Pulmonary/Chest: Effort normal  No respiratory distress  He has no wheezes  He has no rales  Tubular breath sounds left base   Abdominal: Soft  Bowel sounds are normal  He exhibits no distension  Musculoskeletal: He exhibits no edema  Neurological: He is alert and oriented to person, place, and time  No cranial nerve deficit  Skin: Skin is warm and dry  No rash noted  Psychiatric: He has a normal mood and affect         Additional Data:     Labs:      Results from last 7 days  Lab Units 17  0525   WBC Thousand/uL 3 94*   HEMOGLOBIN g/dL 9 6*   HEMATOCRIT % 30 2*   PLATELETS Thousands/uL 104*       Results from last 7 days  Lab Units 17  0517 17  0446   SODIUM mmol/L 141 141   POTASSIUM mmol/L 4 2 3 9   CHLORIDE mmol/L 109* 109*   CO2 mmol/L 25 27   BUN mg/dL 37* 28*   CREATININE mg/dL 1 94* 1 74*   CALCIUM mg/dL 8 8 8 2*   TOTAL PROTEIN g/dL  --  5 6*   BILIRUBIN TOTAL mg/dL  --  0 43   ALK PHOS U/L  --  73   ALT U/L  --  18   AST U/L  --  30   GLUCOSE RANDOM mg/dL 188* 170*       Results from last 7 days  Lab Units 07/05/17  0517   INR  1 85*       * I Have Reviewed All Lab Data Listed Above  * Additional Pertinent Lab Tests Reviewed: All Labs Within Last 24 Hours Reviewed    Imaging:    Imaging Reports Reviewed Today Include: negro  Imaging Personally Reviewed by Myself Includes:  none    Recent Cultures (last 7 days):           Last 24 Hours Medication List:     acetaminophen 650 mg Oral Q8H [address]   aspirin 81 mg Oral Once per day on Mon Thu   bisacodyl 5 mg Oral BID   ferrous sulfate 325 mg Oral BID   fluticasone-salmeterol 1 puff Inhalation Q12H [address]   insulin aspart protamine-insulin aspart 6 Units Subcutaneous Before Dinner   insulin aspart protamine-insulin aspart 7 Units Subcutaneous Early Morning   insulin lispro 1-5 Units Subcutaneous HS   insulin lispro 1-6 Units Subcutaneous TID AC   mirtazapine 15 mg Oral HS   oxyCODONE 2 5 mg Oral BID   pantoprazole 40 mg Oral BID AC   sotalol 80 mg Oral Daily   sucralfate 1,000 mg Oral Q6H DEMIAN   zolpidem 5 mg Oral HS        Today, Patient Was Seen By: Tripp Lopez PA-C    ** Please Note: This note has been constructed using a voice recognition system   **

## 2017-07-06 LAB
GLUCOSE SERPL-MCNC: 133 MG/DL (ref 65–140)
GLUCOSE SERPL-MCNC: 135 MG/DL (ref 65–140)
GLUCOSE SERPL-MCNC: 137 MG/DL (ref 65–140)
GLUCOSE SERPL-MCNC: 94 MG/DL (ref 65–140)
INR PPP: 1.91 (ref 0.86–1.16)
PROTHROMBIN TIME: 22.1 SECONDS (ref 12.1–14.4)

## 2017-07-06 PROCEDURE — G8981 BODY POS CURRENT STATUS: HCPCS

## 2017-07-06 PROCEDURE — 97166 OT EVAL MOD COMPLEX 45 MIN: CPT

## 2017-07-06 PROCEDURE — 85610 PROTHROMBIN TIME: CPT | Performed by: PHYSICIAN ASSISTANT

## 2017-07-06 PROCEDURE — 97530 THERAPEUTIC ACTIVITIES: CPT

## 2017-07-06 PROCEDURE — G8989 SELF CARE D/C STATUS: HCPCS

## 2017-07-06 PROCEDURE — 82948 REAGENT STRIP/BLOOD GLUCOSE: CPT

## 2017-07-06 PROCEDURE — G8982 BODY POS GOAL STATUS: HCPCS

## 2017-07-06 PROCEDURE — G8988 SELF CARE GOAL STATUS: HCPCS

## 2017-07-06 PROCEDURE — 97162 PT EVAL MOD COMPLEX 30 MIN: CPT

## 2017-07-06 PROCEDURE — G8987 SELF CARE CURRENT STATUS: HCPCS

## 2017-07-06 RX ORDER — SODIUM CHLORIDE 9 MG/ML
75 INJECTION, SOLUTION INTRAVENOUS CONTINUOUS
Status: DISCONTINUED | OUTPATIENT
Start: 2017-07-06 | End: 2017-07-07

## 2017-07-06 RX ORDER — PHYTONADIONE 5 MG/1
5 TABLET ORAL ONCE
Status: COMPLETED | OUTPATIENT
Start: 2017-07-06 | End: 2017-07-06

## 2017-07-06 RX ADMIN — SUCRALFATE 1000 MG: 1 SUSPENSION ORAL at 11:44

## 2017-07-06 RX ADMIN — INSULIN ASPART 7 UNITS: 100 INJECTION, SUSPENSION SUBCUTANEOUS at 05:43

## 2017-07-06 RX ADMIN — BISACODYL 5 MG: 5 TABLET, COATED ORAL at 11:45

## 2017-07-06 RX ADMIN — SUCRALFATE 1000 MG: 1 SUSPENSION ORAL at 17:34

## 2017-07-06 RX ADMIN — FLUTICASONE PROPIONATE AND SALMETEROL 1 PUFF: 50; 250 POWDER RESPIRATORY (INHALATION) at 11:47

## 2017-07-06 RX ADMIN — OXYCODONE HYDROCHLORIDE 2.5 MG: 5 TABLET ORAL at 21:59

## 2017-07-06 RX ADMIN — PHYTONADIONE 5 MG: 5 TABLET ORAL at 11:45

## 2017-07-06 RX ADMIN — ACETAMINOPHEN 650 MG: 325 TABLET, FILM COATED ORAL at 14:25

## 2017-07-06 RX ADMIN — Medication 325 MG: at 11:46

## 2017-07-06 RX ADMIN — OXYCODONE HYDROCHLORIDE 2.5 MG: 5 TABLET ORAL at 11:44

## 2017-07-06 RX ADMIN — Medication 325 MG: at 17:34

## 2017-07-06 RX ADMIN — INSULIN ASPART 6 UNITS: 100 INJECTION, SUSPENSION SUBCUTANEOUS at 17:34

## 2017-07-06 RX ADMIN — ZOLPIDEM TARTRATE 5 MG: 5 TABLET, FILM COATED ORAL at 21:59

## 2017-07-06 RX ADMIN — ACETAMINOPHEN 650 MG: 325 TABLET, FILM COATED ORAL at 21:58

## 2017-07-06 RX ADMIN — PANTOPRAZOLE SODIUM 40 MG: 40 TABLET, DELAYED RELEASE ORAL at 17:34

## 2017-07-06 RX ADMIN — BISACODYL 5 MG: 5 TABLET, COATED ORAL at 17:34

## 2017-07-06 RX ADMIN — SODIUM CHLORIDE 75 ML/HR: 0.9 INJECTION, SOLUTION INTRAVENOUS at 11:38

## 2017-07-06 RX ADMIN — MIRTAZAPINE 15 MG: 15 TABLET, FILM COATED ORAL at 21:59

## 2017-07-06 NOTE — PROGRESS NOTES
Progress Note - Kellie Nagy 80 y o  male MRN: [id number]    Unit/Bed#: [facility] 713-01 Encounter: [id number]      GERD (gastroesophageal reflux disease)   Assessment & Plan     Plan:   · With worsening nausea and loss of appetite over the past month  · Continue Protonix b i d and carafate  · GI input appreciated, s/p EGD  With bx revealing chronic gastritis and hiatal hernia   · Surgery was consulted by GI regarding hiatal hernia however he is a poor surgical candidate so there will be no plan for surgical intervention at this time  · Continue diet as tolerated  · Pain control        Pleural effusion   Assessment & Plan     Assessment: Exudative (L)   Plan:   · Status post thoracentesis for 525 cc of serosanguineous fluid  Fluid analysis negative for malignancy but lymphocyte predominance   · Pulmonary is following  · CT chest reveal RLL mass  · suspect underlying GI malignancy and IR bx once INR less than 1 5  · On room air, and stable from pulmonary standpoint         Pancreatic mass   Assessment & Plan      · Likely malignancy with mets to liver and malignant pleural effusion  · Patient wishes to pursue IR biopsy of liver lesion  · INR needs to be less than 1 5, will give 5mg of Vit K today and recheck INR in early morning and consider FFP if still >1 5  Consent on chart  Coumadin on hold  · Appreciate heme/onc input         Right lower lobe lung mass   Assessment & Plan      · Patient had thoracentesis which results were negative for malignancy however showed high percentage of lymphocytes  · Pulmonary input appreciated  · CAT scan of the A/P noted  · This is likely a malignant effusion         A-fib   Assessment & Plan     Plan:   · Rate controlled on sotalol    · Continue holding coumadin to achieve INR <1 5 for IR biopsy             Loss of appetite   Assessment & Plan     Plan:   · Remeron has been initiated         DM2 (diabetes mellitus, type 2)   Assessment & Plan     Plan:   · Hemoglobin A1c 7 9   · Blood sugars reviewed  · Continue current insulin regimen, 50% decreased dose of PM insulin due to NPO at midnight  · Continue sliding scale insulin and Accu-Cheks  · Continue to monitor PO intake           VTE Pharmacologic Prophylaxis:   Pharmacologic: Warfarin (Coumadin)  Mechanical VTE Prophylaxis in Place: Yes    Patient Centered Rounds: I have performed bedside rounds with nursing staff today  Discussions with Specialists or Other Care Team Provider: nursing, case management, IR     Education and Discussions with Family / Patient: patient, son called with update  Time Spent for Care: 30 minutes  More than 50% of total time spent on counseling and coordination of care as described above  Current Length of Stay: 8 day(s)    Current Patient Status: Inpatient   Certification Statement: The patient will continue to require additional inpatient hospital stay due to IR biopsy     Discharge Plan: likely discharge following IR biopsy to SNF    Code Status: Level 3 - DNAR and DNI      Subjective:   Patient seen and examined at bedside  Discussed with rn  Patient frustrated he can not have IR biopsy today  He is in good spirits  No n/v/d  Some abdominal discomfort  No chest pain but reports some shortness of breath when exerting himself in wheelchair  States he feels weak and would like to move around more  Objective:     Vitals:   Temp (24hrs), Av 2 °F (36 8 °C), Min:98 °F (36 7 °C), Max:98 4 °F (36 9 °C)    HR:  [56-63] 62  Resp:  [18-20] 18  BP: ()/(43-73) 116/73  SpO2:  [92 %-96 %] 96 %  Body mass index is 26 11 kg/m²  Input and Output Summary (last 24 hours): Intake/Output Summary (Last 24 hours) at 17 1210  Last data filed at 17 0710   Gross per 24 hour   Intake              240 ml   Output              530 ml   Net             -290 ml       Physical Exam:     Physical Exam   Constitutional: He is oriented to person, place, and time   He appears well-developed and well-nourished  No distress  HENT:   Head: Normocephalic and atraumatic  Eyes: EOM are normal  No scleral icterus  Neck: Normal range of motion  Neck supple  Cardiovascular: Normal rate and regular rhythm  Murmur heard  Pulmonary/Chest: Effort normal and breath sounds normal    Abdominal: Soft  Bowel sounds are normal    Musculoskeletal: Normal range of motion  He exhibits no edema  Neurological: He is alert and oriented to person, place, and time  Skin: Skin is warm and dry  Psychiatric: He has a normal mood and affect  His behavior is normal    Nursing note and vitals reviewed  Additional Data:     Labs:      Results from last 7 days  Lab Units 06/30/17  0525   WBC Thousand/uL 3 94*   HEMOGLOBIN g/dL 9 6*   HEMATOCRIT % 30 2*   PLATELETS Thousands/uL 104*       Results from last 7 days  Lab Units 07/05/17  0517 07/02/17  0446   SODIUM mmol/L 141 141   POTASSIUM mmol/L 4 2 3 9   CHLORIDE mmol/L 109* 109*   CO2 mmol/L 25 27   BUN mg/dL 37* 28*   CREATININE mg/dL 1 94* 1 74*   CALCIUM mg/dL 8 8 8 2*   TOTAL PROTEIN g/dL  --  5 6*   BILIRUBIN TOTAL mg/dL  --  0 43   ALK PHOS U/L  --  73   ALT U/L  --  18   AST U/L  --  30   GLUCOSE RANDOM mg/dL 188* 170*       Results from last 7 days  Lab Units 07/06/17  0456   INR  1 91*       * I Have Reviewed All Lab Data Listed Above  * Additional Pertinent Lab Tests Reviewed:  [facility] Admission Reviewed    Imaging:    Imaging Reports Reviewed Today Include: ct ab/pel  Imaging Personally Reviewed by Myself Includes:  none    Recent Cultures (last 7 days):           Last 24 Hours Medication List:     acetaminophen 650 mg Oral Q8H [address]   aspirin 81 mg Oral Once per day on Mon Thu   bisacodyl 5 mg Oral BID   ferrous sulfate 325 mg Oral BID   fluticasone-salmeterol 1 puff Inhalation Q12H [address]   insulin aspart protamine-insulin aspart 6 Units Subcutaneous Before Dinner   insulin aspart protamine-insulin aspart 7 Units Subcutaneous Early Morning   insulin lispro 1-5 Units Subcutaneous HS   insulin lispro 1-6 Units Subcutaneous TID AC   mirtazapine 15 mg Oral HS   oxyCODONE 2 5 mg Oral BID   pantoprazole 40 mg Oral BID AC   sotalol 80 mg Oral Daily   sucralfate 1,000 mg Oral Q6H DEMIAN   zolpidem 5 mg Oral HS        Today, Patient Was Seen By: Guerda Polanco PA-C    ** Please Note: Dragon 360 Dictation voice to text software may have been used in the creation of this document   **

## 2017-07-06 NOTE — ASSESSMENT & PLAN NOTE
Plan:   · Rate controlled on sotalol    · Continue holding coumadin to achieve INR <1 5 for IR biopsy

## 2017-07-06 NOTE — PLAN OF CARE
Problem: PHYSICAL THERAPY ADULT  Goal: Performs mobility at highest level of function for planned discharge setting  See evaluation for individualized goals  Treatment/Interventions: Functional transfer training, LE strengthening/ROM, Therapeutic exercise, Endurance training, Bed mobility, Gait training, Compensatory technique education  Equipment Recommended: Other (Comment) (pt owns W/C)       See flowsheet documentation for full assessment, interventions and recommendations  Outcome: Progressing  Prognosis: Good  Problem List: Decreased strength, Decreased endurance, Impaired balance, Decreased mobility, Impaired hearing  Assessment: Pt engaged in post eval PT treatment session focussing on improving overall mbililty  Pt able to incrase distacne mobilized in w/c to 79' but reported icreased work of breathing (likely related to increased heart rate with use of BRIANDA)  Pt practiced toliet transfer with therapist in which presented environmenal barrier (dec height of toliet with lack of bars like at [facility])  Pt to benefit from contined skilled IP PT intervention to progress functiaonl mobililty (I) and safty  Return to senior living with same level of care when medically appropriate  Recommendation: Other (Comment) (return to senior living with same level of care)     PT - OK to Discharge: Yes (when medically appropriate )    See flowsheet documentation for full assessment

## 2017-07-06 NOTE — PHYSICAL THERAPY NOTE
PT TREATMENT        07/06/17 0957   Pain Assessment   Pain Assessment No/denies pain   Pain Score No Pain   Restrictions/Precautions   Weight Bearing Precautions No   Other Precautions Fall Risk   General   Chart Reviewed Yes   Response to Previous Treatment Patient with no complaints from previous session  Family/Caregiver Present No   Cognition   Overall Cognitive Status WFL   Arousal/Participation Alert   Attention Within functional limits   Orientation Level Oriented X4   Memory Within functional limits   Following Commands Follows all commands and directions without difficulty   Subjective   Subjective Pt agreeable to conitnue PT treatment post eval    Transfers   Toilet transfer 4  Minimal assistance   Additional items Assist x 1;Standard toilet   Additional Comments Drop arm of chair utilized   Wheelchair Activities   Wheelchair Cushion Standard   Pressure Relief Type Self adjusts   Level of Assistance for Pressure Relief Activities Close supervision   Wheelchair Parts Management Yes   Left Armrest Level of Assistance Distant supervision   Right Armrest Level of Assistance Distant supervision   Left Brakes Level of Assistance Distant supervision   Right Brakes Level of Assistance Distant supervision   Propulsion Yes   Propulsion Type 1 Manual   Level 1 Level tile   Method 1 Right upper extremity; Left upper extremity;Right lower extremity; Left lower extremity   Level of Assistance 1 Close supervision   Description/ Details 1 70'   Balance   Static Sitting Fair +   Dynamic Sitting Fair -   Static Standing Fair -   Dynamic Standing Fair -   Endurance Deficit   Endurance Deficit Yes   Endurance Deficit Description Pt reports feeling "winded" post session  Pursed lip breathing utilized to return pt's breathing to WNL    Activity Tolerance   Activity Tolerance Patient limited by fatigue   Medical Staff Made Aware yes   Nurse Made Aware Spoke with PCA post session in regards to return to bed transferring  Assessment   Prognosis Good   Problem List Decreased strength;Decreased endurance; Impaired balance;Decreased mobility; Impaired hearing   Assessment Pt engaged in post eval PT treatment session focussing on improving overall mbililty  Pt able to incrase distacne mobilized in w/c to 79' but reported icreased work of breathing (likely related to increased heart rate with use of BRIANDA)  Pt practiced toliet transfer with therapist in which presented environmenal barrier (dec height of toliet with lack of bars like at [facility])  Pt to benefit from contined skilled IP PT intervention to progress functiaonl mobililty (I) and safty  Return to [facility] with same level of care when medically appropriate  Goals   Patient Goals to return home    STG Expiration Date 07/13/17   Treatment Day 1   Plan   Treatment/Interventions Functional transfer training;LE strengthening/ROM; Therapeutic exercise; Endurance training;Bed mobility;Gait training; Compensatory technique education   Progress Progressing toward goals   PT Frequency 5x/wk   Recommendation   Recommendation Other (Comment)  (return to [facility] with same level of care)   Equipment Recommended Other (Comment)  (pt has w/c)   PT - OK to Discharge Yes  (when medically appropriate )   Additional Comments OOB in chair with all needs within reach  Pt educated on use of call dolores Singleton, PT

## 2017-07-06 NOTE — ASSESSMENT & PLAN NOTE
Assessment: Exudative (L)   Plan:   · Status post thoracentesis for 525 cc of serosanguineous fluid  Fluid analysis negative for malignancy but lymphocyte predominance   · Pulmonary is following    · CT chest reveal RLL mass  · suspect underlying GI malignancy and IR bx once INR less than 1 5  · On room air, and stable from pulmonary standpoint

## 2017-07-06 NOTE — OCCUPATIONAL THERAPY NOTE
[address] Evaluation     Patient Name: Veronica Petit  MQAVP'C Date: 7/6/2017  Problem List  Patient Active Problem List   Diagnosis    DVT (deep venous thrombosis)    Acute deep vein thrombosis (DVT) of femoral vein of right lower extremity    DM2 (diabetes mellitus, type 2)    Chronic ulcer of right foot    CKD (chronic kidney disease) stage 3, GFR 30-59 ml/min    Peripheral vascular disease    COPD (chronic obstructive pulmonary disease)    GERD (gastroesophageal reflux disease)    History of cirrhosis    Pancytopenia    Cough    Chronic diastolic congestive heart failure    History of DVT (deep vein thrombosis)    Loss of appetite    Pleural effusion    Nausea    A-fib    Right lower lobe lung mass    Liver lesion    Pancreatic mass     Past Medical History  Past Medical History:   Diagnosis Date    Atrial fibrillation     CHF (congestive heart failure)     Chronic kidney disease (CKD), stage III (moderate)     Chronic venous hypertension with ulcer     Right    COPD (chronic obstructive pulmonary disease)     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     HbA1C: 8/24/15: 6 5%    GERD (gastroesophageal reflux disease)     Hypertension     PVD (peripheral vascular disease)     Right lower lobe lung mass 6/29/2017     Past Surgical History  Past Surgical History:   Procedure Laterality Date    CARDIAC PACEMAKER PLACEMENT      ESOPHAGOGASTRODUODENOSCOPY N/A 6/28/2017    Procedure: ESOPHAGOGASTRODUODENOSCOPY (EGD); Surgeon: Itzel Villeda MD;  Location: [facility];   Service: Gastroenterology    HIP FRACTURE SURGERY Right 08/2015 07/06/17 0950   Note Type   Note type Eval only   Restrictions/Precautions   Weight Bearing Precautions No   Other Precautions Fall Risk   Pain Assessment   Pain Assessment No/denies pain   Pain Score No Pain   Home Living   Type of Home Assisted living  [facility])   Home Layout Performs ADLs on one level   Bathroom Shower/Tub Walk-in shower   Bathroom Toilet Raised   Bathroom Equipment Commode;Tub transfer bench   Bathroom Accessibility Accessible via wheelchair   [facility]   Additional Comments  [address]  Prior Function   Level of [place] Needs assistance with ADLs and functional mobility; Needs assistance with IADLs;Modified independent with wheelchair   Lives With Facility staff   Receives Help From Other (Comment)  (FACILITY STAFF)   ADL Assistance Needs assistance  (INDEPENDENT DRESSING, ASSIST X1 BATHING PER FACILITY)   IADLs Needs assistance   Falls in the last 6 months 0   Vocational Retired   Lifestyle   Autonomy  [facility] W/ PRN ASSIST FOR LB DRESSING WHILE  [facility] ([place] [address])  PT IS PRIMARILY WC BOUND AND TRANSFERS TO DROP ARM WC  PT W/ USE OF BSC AND SC PTA  NO RECENT H/O FALLS  [employer] PT RESIDES AT [facility]  REPORTS 1 LOCAL SON  Service to Others PT IS RETIRED  REPORTS HE PREVIOUSLY WORKED AT [employer]  [employer] PT ENJOYS WATCHING TV  Psychosocial   Psychosocial (WDL) WDL   Subjective   Subjective "I WANT TO MOVE MORE'   ADL   Where Assessed Wheelchair   Eating Assistance 5  Supervision/Setup   Grooming Assistance 5  Supervision/Setup   UB Bathing Assistance 4  Minimal Assistance   LB Bathing Assistance 4  Minimal Assistance   [address]  4  Minimal Assistance   Bed Mobility   Supine to Sit 5  Supervision   Additional items Assist x 1; Increased time required   Sit to Supine Unable to assess   Additional Comments PT LEFT IN WHEELCHAIR WITH PHYSICAL THERAPIST POST EVAL  Transfers   Sit pivot 4  Minimal assistance   Additional items Assist x 1; Increased time required;Verbal cues   Additional Comments PT COMPLETED SIT>PIVOT TRANSFER FROM EOB>DROP ARM WC W/ MIN A X1  PT SELF DIRECTED TRANSFER AND WC PLACEMENT  Functional Mobility   Additional Comments PT IS WC BOUND AT BASELINE  PT ABLE TO SELF PROPEL WC W/IN ROOM/HALLWAY THIS AM    Balance   Static Sitting Fair +   Dynamic Sitting Fair   Static Standing (NOT ASSESSED)   Dynamic Standing (NOT ASSESSED)   Ambulatory (NOT ASSESSED)   Activity Tolerance   Activity Tolerance Patient limited by fatigue;Treatment limited secondary to medical complications (Comment)   Medical Staff Made Aware F/U W/ CM ROMERO   Nurse Made Aware OKAY TO SEE PER SILVIA TORRES   RUE Assessment   RUE Assessment WFL   LUE Assessment   LUE Assessment WFL   Hand Function   Gross Motor Coordination Functional   Fine Motor Coordination Functional   Cognition   Overall Cognitive Status WFL   Arousal/Participation Alert   Attention Within functional limits   Orientation Level Oriented X4   Memory Within functional limits   Following Commands Follows multistep commands without difficulty   Comments PT LIMITED 2* [place] HOWEVER ENGAGES IN APPROPRIATE CONVERSATION  PT ABLE TO SELF DIRECT WC TRANSFERS  Assessment   Prognosis Fair   Assessment PT IS AN 81 YO M ADMIT W/ C/O ABDOMINAL PAIN AND DECREASED PO INTAKE  PT CURRENTLY UNDERGOING W/U FOR LOSS OF APPETITE AND NAUSEA  PT FOUND W/ L PLEURAL EFFUSION, DYSPHAGIA, RLL MASS, HIATAL HERNIA, PANCREATIC MASS AND APPARENT METASTATIC LIVER LESIONS  PT W/ PMH SIGNIFICANT FOR RECENT ADMISSION TO B FOR TRACHEOBRONCHITIS, CKD, AFIB, CHF, COPD, DM, GERD, PVD, HTN, PACER, R HIP FX/SX AND ABESTOS EXPOSURE  PT IS NOW S/P 6/26 THORACENTESIS AND 6/28 EGD  PT IS CURRENTLY PENDING IR BIOPSY OF PANCREATIC MASS/LIVER LESIONS  [facility] ADMIT FROM [facility]  PT REPORTS BASELINE INDEPENDENCE IN DRESSING W/ PRN ASSIST REQUIRED FOR LB DRESSING WHILE FACILITY STAFF ASSISTS W/ BATHING/IADLS  PT IS PRIMARILY WC BOUND AND REPORTS BASELINE INDEPENDENCE IN WC TRANSFERS   PT ALSO W/ USE OF BSC AND SC  CURRENTLY PT REQUIRING MIN A UB ADLS, MIN/MOD A LB ADLS, SUPERVISION BED MOBILITY AND MIN A SIT>PIVOT TRANSFERS FROM EOB>WC  PT ABLE TO SELF PROPEL WC W/IN ROOM/YOUNG W/ SBA THIS AM  PT LIMITED AT THIS TIME 2* IMPAIRED BALANCE, ACTIVITY TOLERANCE, MEDICAL STATUS, PAIN, ADL IMPAIRMENTS AND HEARING IMPAIRMENT  HOWEVER, PT DOES APPEAR TO BE FUNCTIONING AT OR CLOSE TO BASELINE IN RE: ADL COMPLETION  OT ANTICIPATES RETURN TO [facility] WITH CONTINUED ASSISTANCE FOR ADL COMPLETION PENDING FURTHER MEDICAL STABILITY  NO ADD'L INPT OT NEEDS IDENTIFIED AT THIS TIME  D/C OT  Goals   Patient Goals PT WOULD LIKE TO GO HOME  Plan   OT Frequency Eval only   Recommendation   Discharge Recommendation Other (Comment)  (RETURN TO [facility] PENDING MEDICAL STABILITY )   OT - OK to Discharge (PENDING MEDICAL STABILITY  )   Barthel Index   Feeding 10   Bathing 0   Grooming Score 5   Dressing Score 5   Bladder Score 10   Bowels Score 10   Toilet Use Score 5   Transfers (Bed/Chair) Score 10   Mobility (Level Surface) Score 5   Stairs Score 0   Barthel Index Score 60   Modified [place] Scale   Modified [place] Scale 4     DOCUMENTATION COMPLETED BY MORENO CONTE MS, OTR/L

## 2017-07-06 NOTE — ASSESSMENT & PLAN NOTE
Plan:   · Hemoglobin A1c 7 9  · Blood sugars reviewed  · Continue current insulin regimen, 50% decreased dose of PM insulin due to NPO at midnight  · Continue sliding scale insulin and Accu-Cheks    · Continue to monitor PO intake

## 2017-07-06 NOTE — PLAN OF CARE
Problem: PHYSICAL THERAPY ADULT  Goal: Performs mobility at highest level of function for planned discharge setting  See evaluation for individualized goals  Treatment/Interventions: Functional transfer training, LE strengthening/ROM, Therapeutic exercise, Endurance training, Bed mobility, Gait training, Compensatory technique education  Equipment Recommended: Other (Comment) (pt owns W/C)       See flowsheet documentation for full assessment, interventions and recommendations  Prognosis: Good  Problem List: Decreased strength, Decreased endurance, Impaired balance, Decreased mobility, Impaired hearing  Assessment: Pt is an 80year old male presenting to [facility] 6/25/17 with nausea  Pt with a problem list which includes pancreatic mass, liver lesion, R lower lobe lung mass, a-fib, pleural effusion  And loss of appetite  Pt with a PMH which includes CHF, cough, h/o cirrhosis, COPD, DVT, DM and CKD  Pt with a recent admission 5/29/17-6/2/17 where he presented with cough and was diagnosed with tracheobronchitis  Pt underwent EGD 6/28/17 and is currently waiting IR biopsy  PT consulted to assess functional mobility and assist with safe d/c planning  Pt is a resident of [facility] where he utilizes a w/c for mobility  Pt able to (I) transfers and denies any h/o falls  Family provides patient transportation to appointments  Facility provides meals and assists with medication  PCA also assists with occasional donning of socks  Pt able to (I) sponge bathe  On eval, pt presenting with the following deficits: impaired balance, fatigue, poor activity tolerance and decreased overall functional mobility  Pt in supine upon arrival and agreeable to participate I PT session  Pt required CGA/Jyothi for transfer into w/c and bed mobility  Pt with initial complaints of dizziness which subsided with rest and pursed lip breathing   Pt near baseline but would benefit from continued transfer training, mobility in w/c and therex to progress functional mobility (I) and safety for return to snf with same level of care  Recommendation: Other (Comment) (return to snf with same level of care)     PT - OK to Discharge: Yes (when medically appropriate )    See flowsheet documentation for full assessment

## 2017-07-06 NOTE — PHYSICAL THERAPY NOTE
Physical Therapy Evaluation    Patient's Name: Nestor Peers    Admitting Diagnosis  Loss of appetite [R63 0]  Nausea [R11 0]  Fatigue [R53 83]  Pleural effusion, left [J90]    Problem List  Patient Active Problem List   Diagnosis    DVT (deep venous thrombosis)    Acute deep vein thrombosis (DVT) of femoral vein of right lower extremity    DM2 (diabetes mellitus, type 2)    Chronic ulcer of right foot    CKD (chronic kidney disease) stage 3, GFR 30-59 ml/min    Peripheral vascular disease    COPD (chronic obstructive pulmonary disease)    GERD (gastroesophageal reflux disease)    History of cirrhosis    Pancytopenia    Cough    Chronic diastolic congestive heart failure    History of DVT (deep vein thrombosis)    Loss of appetite    Pleural effusion    Nausea    A-fib    Right lower lobe lung mass    Liver lesion    Pancreatic mass       Past Medical History  Past Medical History:   Diagnosis Date    Atrial fibrillation     CHF (congestive heart failure)     Chronic kidney disease (CKD), stage III (moderate)     Chronic venous hypertension with ulcer     Right    COPD (chronic obstructive pulmonary disease)     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     HbA1C: 8/24/15: 6 5%    GERD (gastroesophageal reflux disease)     Hypertension     PVD (peripheral vascular disease)     Right lower lobe lung mass 6/29/2017       Past Surgical History  Past Surgical History:   Procedure Laterality Date    CARDIAC PACEMAKER PLACEMENT      ESOPHAGOGASTRODUODENOSCOPY N/A 6/28/2017    Procedure: ESOPHAGOGASTRODUODENOSCOPY (EGD); Surgeon: Alicia Rai MD;  Location: [facility];   Service: Gastroenterology    HIP FRACTURE SURGERY Right 08/2015 07/06/17 0936   Note Type   Note type Eval/Treat   Pain Assessment   Pain Assessment No/denies pain   Pain Score No Pain   Home Living   Type of Home Assisted living  [facility]   Home Layout Elevator;Multi-level   Bathroom Shower/Tub (pt reports sponge bathing )   Bathroom Toilet Raised   Bathroom Equipment Other (Comment)  (BSC over commode)   Bathroom Accessibility Accessible;Accessible via wheelchair   [facility]   Additional Comments Pt reports utilizing a W/C for "years"    Prior Function   Level of Mena Modified independent with wheelchair   Lives With Facility staff   Receives Help From Personal care attendant   ADL Assistance Independent   IADLs Needs assistance   Falls in the last 6 months 0   Vocational Retired   Comments Pt is a retired [employer] employee  He is mod(I) at [facility] with bathing and mobilizing  Pt is able to (I) transfer to and from w/c  Pt receives occasional assistance with donning of socks  Meals are provided for him  Facility assists with medication management  Restrictions/Precautions   Weight Bearing Precautions No   Other Precautions Fall Risk   General   Additional Pertinent History Pt underwent EGD 6/28/17  Awaiting IR biopsy this AM    Family/Caregiver Present No   Cognition   Overall Cognitive Status WFL   Arousal/Participation Alert   Orientation Level Oriented X4   Memory Within functional limits   Following Commands Follows multistep commands with increased time or repetition   Comments Pt is [place] and requires repetition at times  He is pleasant and cooperative   Agreeable to particiapte in PT session    RUE Assessment   RUE Assessment WFL   LUE Assessment   LUE Assessment WFL   RLE Assessment   RLE Assessment WFL   LLE Assessment   LLE Assessment WFL   Coordination   Movements are Fluid and Coordinated 1   Sensation WFL   Light Touch   RLE Light Touch Grossly intact   LLE Light Touch Grossly intact   Proprioception   RLE Proprioception Impaired   LLE Proprioception Impaired   Bed Mobility   Supine to Sit 4  Minimal assistance   Additional items (CGA)   Additional Comments Pt required CGA/CS for transfer 2* mild dizziness which subsided with rest    Transfers   Sit to Stand 4  Minimal assistance   Additional items Assist x 1; Increased time required;Verbal cues   Stand to Sit 4  Minimal assistance   Additional items Assist x 1; Increased time required;Verbal cues   Stand pivot 4  Minimal assistance   Additional items Assist x 1; Increased time required;Verbal cues   Sit pivot 4  Minimal assistance   Additional items Assist x 1; Increased time required;Verbal cues   Additional Comments Katerine recorded above for transfer level 2* CGA/CS provided for safety in new environment  Ambulation/Elevation   Gait Assistance Not tested   Wheelchair Activities   Wheelchair Cushion Standard   Pressure Relief Type Self adjusts   Level of Assistance for Pressure Relief Activities Close supervision   Wheelchair Parts Management Yes   Left Armrest Level of Assistance Close supervision   Right Armrest Level of Assistance Close supervision   Left Brakes Level of Assistance Close supervision   Right Brakes Level of Assistance Close supervision   Propulsion Yes   Propulsion Type 1 Manual   Level 1 Level tile   Method 1 Right upper extremity; Left upper extremity;Right lower extremity; Left lower extremity   Level of Assistance 1 Close supervision   Description/ Details 1 40'   Balance   Static Sitting Fair +   Dynamic Sitting Fair -   Static Standing Fair -   Dynamic Standing Fair -   Endurance Deficit   Endurance Deficit Yes   Endurance Deficit Description Pt with GRAY throughout activity in which he was educated on and demonstrated abililty to perform pursed lip breathing  Attempted to obtain SpO2 reading however unable likely 2* a-fib  Activity Tolerance   Activity Tolerance Patient tolerated treatment well   Medical Staff Made Aware Spoke with Shona Vásquez, [facility], [facility]   Nurse Made Aware appropriate to see per Kaylie Martell RN    Assessment   Prognosis Good   Problem List Decreased strength;Decreased endurance; Impaired balance;Decreased mobility; Impaired hearing Assessment Pt is an 80year old male presenting to [facility] 6/25/17 with nausea  Pt with a problem list which includes pancreatic mass, liver lesion, R lower lobe lung mass, a-fib, pleural effusion  And loss of appetite  Pt with a PMH which includes CHF, cough, h/o cirrhosis, COPD, DVT, DM and CKD  Pt with a recent admission 5/29/17-6/2/17 where he presented with cough and was diagnosed with tracheobronchitis  Pt underwent EGD 6/28/17 and is currently waiting IR biopsy  PT consulted to assess functional mobility and assist with safe d/c planning  Pt is a resident of [facility] where he utilizes a w/c for mobility  Pt able to (I) transfers and denies any h/o falls  Family provides patient transportation to appointments  Facility provides meals and assists with medication  PCA also assists with occasional donning of socks  Pt able to (I) sponge bathe  On eval, pt presenting with the following deficits: impaired balance, fatigue, poor activity tolerance and decreased overall functional mobility  Pt in supine upon arrival and agreeable to participate I PT session  Pt required CGA/Jyothi for transfer into w/c and bed mobility  Pt with initial complaints of dizziness which subsided with rest and pursed lip breathing  Pt near baseline but would benefit from continued transfer training, mobility in w/c and therex to progress functional mobility (I) and safety for return to [facility] with same level of care  Goals   Patient Goals to return home   STG Expiration Date 07/13/17   Short Term Goal #1 Pt will be mod(I) with bed mobililty  Pt will be mod(I) with sit/stand pivot transfers  Pt will particiapte in HEP  Pt will (I) mobilize in w/c >100'  Treatment Day 0   Plan   Treatment/Interventions Functional transfer training;LE strengthening/ROM; Therapeutic exercise; Endurance training;Bed mobility;Gait training; Compensatory technique education   PT Frequency 5x/wk   Recommendation   Recommendation Other (Comment)  ([facility] with same [facility] of care)   Equipment Recommended Other (Comment)  (pt owns W/C)   PT - OK to Discharge Yes  (when medically appropriate )   Barthel Index   Feeding 10   Bathing 0   Grooming Score 5   Dressing Score 5   Bladder Score 10   Bowels Score 10   Toilet Use Score 5   Transfers (Bed/Chair) Score 10   Mobility (Level Surface) Score 5   Stairs Score 0   Barthel Index Score 60       Kala Nava, PT

## 2017-07-06 NOTE — ASSESSMENT & PLAN NOTE
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

## 2017-07-06 NOTE — ASSESSMENT & PLAN NOTE
Plan:   · With worsening nausea and loss of appetite over the past month  · Continue Protonix b i d and carafate  · GI input appreciated, s/p EGD  With bx revealing chronic gastritis and hiatal hernia   · Surgery was consulted by GI regarding hiatal hernia however he is a poor surgical candidate so there will be no plan for surgical intervention at this time    · Continue diet as tolerated  · Pain control

## 2017-07-07 ENCOUNTER — APPOINTMENT (INPATIENT)
Dept: RADIOLOGY | Facility: HOSPITAL | Age: 82
DRG: 436 | End: 2017-07-07
Payer: COMMERCIAL

## 2017-07-07 PROBLEM — R33.9 URINARY RETENTION: Status: ACTIVE | Noted: 2017-07-07

## 2017-07-07 LAB
ANION GAP SERPL CALCULATED.3IONS-SCNC: 6 MMOL/L (ref 4–13)
BUN SERPL-MCNC: 32 MG/DL (ref 5–25)
CALCIUM SERPL-MCNC: 8.3 MG/DL (ref 8.3–10.1)
CHLORIDE SERPL-SCNC: 112 MMOL/L (ref 100–108)
CO2 SERPL-SCNC: 25 MMOL/L (ref 21–32)
CREAT SERPL-MCNC: 1.71 MG/DL (ref 0.6–1.3)
ERYTHROCYTE [DISTWIDTH] IN BLOOD BY AUTOMATED COUNT: 15.1 % (ref 11.6–15.1)
GFR SERPL CREATININE-BSD FRML MDRD: 38 ML/MIN/1.73SQ M
GLUCOSE SERPL-MCNC: 118 MG/DL (ref 65–140)
GLUCOSE SERPL-MCNC: 122 MG/DL (ref 65–140)
GLUCOSE SERPL-MCNC: 125 MG/DL (ref 65–140)
GLUCOSE SERPL-MCNC: 131 MG/DL (ref 65–140)
GLUCOSE SERPL-MCNC: 147 MG/DL (ref 65–140)
GLUCOSE SERPL-MCNC: 98 MG/DL (ref 65–140)
HCT VFR BLD AUTO: 31.8 % (ref 36.5–49.3)
HGB BLD-MCNC: 10 G/DL (ref 12–17)
INR PPP: 1.37 (ref 0.86–1.16)
MCH RBC QN AUTO: 29 PG (ref 26.8–34.3)
MCHC RBC AUTO-ENTMCNC: 31.4 G/DL (ref 31.4–37.4)
MCV RBC AUTO: 92 FL (ref 82–98)
PLATELET # BLD AUTO: 111 THOUSANDS/UL (ref 149–390)
PMV BLD AUTO: 10.8 FL (ref 8.9–12.7)
POTASSIUM SERPL-SCNC: 4 MMOL/L (ref 3.5–5.3)
PROTHROMBIN TIME: 16.9 SECONDS (ref 12.1–14.4)
RBC # BLD AUTO: 3.45 MILLION/UL (ref 3.88–5.62)
SODIUM SERPL-SCNC: 143 MMOL/L (ref 136–145)
WBC # BLD AUTO: 3.58 THOUSAND/UL (ref 4.31–10.16)

## 2017-07-07 PROCEDURE — 80048 BASIC METABOLIC PNL TOTAL CA: CPT | Performed by: PHYSICIAN ASSISTANT

## 2017-07-07 PROCEDURE — 99152 MOD SED SAME PHYS/QHP 5/>YRS: CPT

## 2017-07-07 PROCEDURE — 88341 IMHCHEM/IMCYTCHM EA ADD ANTB: CPT | Performed by: INTERNAL MEDICINE

## 2017-07-07 PROCEDURE — 85610 PROTHROMBIN TIME: CPT | Performed by: PHYSICIAN ASSISTANT

## 2017-07-07 PROCEDURE — 85027 COMPLETE CBC AUTOMATED: CPT | Performed by: PHYSICIAN ASSISTANT

## 2017-07-07 PROCEDURE — 0FB23ZX EXCISION OF LEFT LOBE LIVER, PERCUTANEOUS APPROACH, DIAGNOSTIC: ICD-10-PCS | Performed by: RADIOLOGY

## 2017-07-07 PROCEDURE — 47000 NEEDLE BIOPSY OF LIVER PERQ: CPT

## 2017-07-07 PROCEDURE — 88307 TISSUE EXAM BY PATHOLOGIST: CPT | Performed by: INTERNAL MEDICINE

## 2017-07-07 PROCEDURE — 82948 REAGENT STRIP/BLOOD GLUCOSE: CPT

## 2017-07-07 PROCEDURE — 76942 ECHO GUIDE FOR BIOPSY: CPT

## 2017-07-07 PROCEDURE — 88333 PATH CONSLTJ SURG CYTO XM 1: CPT | Performed by: INTERNAL MEDICINE

## 2017-07-07 PROCEDURE — 88342 IMHCHEM/IMCYTCHM 1ST ANTB: CPT | Performed by: INTERNAL MEDICINE

## 2017-07-07 RX ORDER — FUROSEMIDE 40 MG/1
40 TABLET ORAL
Status: DISCONTINUED | OUTPATIENT
Start: 2017-07-07 | End: 2017-07-08 | Stop reason: HOSPADM

## 2017-07-07 RX ORDER — MIDAZOLAM HYDROCHLORIDE 1 MG/ML
INJECTION INTRAMUSCULAR; INTRAVENOUS CODE/TRAUMA/SEDATION MEDICATION
Status: COMPLETED | OUTPATIENT
Start: 2017-07-07 | End: 2017-07-07

## 2017-07-07 RX ORDER — TAMSULOSIN HYDROCHLORIDE 0.4 MG/1
0.4 CAPSULE ORAL
Status: DISCONTINUED | OUTPATIENT
Start: 2017-07-07 | End: 2017-07-08 | Stop reason: HOSPADM

## 2017-07-07 RX ORDER — WARFARIN SODIUM 5 MG/1
5 TABLET ORAL
Status: COMPLETED | OUTPATIENT
Start: 2017-07-07 | End: 2017-07-07

## 2017-07-07 RX ORDER — FUROSEMIDE 10 MG/ML
40 INJECTION INTRAMUSCULAR; INTRAVENOUS ONCE
Status: COMPLETED | OUTPATIENT
Start: 2017-07-07 | End: 2017-07-07

## 2017-07-07 RX ADMIN — FLUTICASONE PROPIONATE AND SALMETEROL 1 PUFF: 50; 250 POWDER RESPIRATORY (INHALATION) at 08:17

## 2017-07-07 RX ADMIN — PANTOPRAZOLE SODIUM 40 MG: 40 TABLET, DELAYED RELEASE ORAL at 17:01

## 2017-07-07 RX ADMIN — SUCRALFATE 1000 MG: 1 SUSPENSION ORAL at 17:01

## 2017-07-07 RX ADMIN — ZOLPIDEM TARTRATE 5 MG: 5 TABLET, FILM COATED ORAL at 21:22

## 2017-07-07 RX ADMIN — Medication 325 MG: at 17:01

## 2017-07-07 RX ADMIN — MIRTAZAPINE 15 MG: 15 TABLET, FILM COATED ORAL at 21:23

## 2017-07-07 RX ADMIN — WARFARIN SODIUM 5 MG: 5 TABLET ORAL at 17:01

## 2017-07-07 RX ADMIN — SUCRALFATE 1000 MG: 1 SUSPENSION ORAL at 11:10

## 2017-07-07 RX ADMIN — OXYCODONE HYDROCHLORIDE 2.5 MG: 5 TABLET ORAL at 08:17

## 2017-07-07 RX ADMIN — BISACODYL 5 MG: 5 TABLET, COATED ORAL at 08:17

## 2017-07-07 RX ADMIN — OXYCODONE HYDROCHLORIDE 2.5 MG: 5 TABLET ORAL at 21:23

## 2017-07-07 RX ADMIN — ACETAMINOPHEN 650 MG: 325 TABLET, FILM COATED ORAL at 13:21

## 2017-07-07 RX ADMIN — TAMSULOSIN HYDROCHLORIDE 0.4 MG: 0.4 CAPSULE ORAL at 11:51

## 2017-07-07 RX ADMIN — MIDAZOLAM 0.5 MG: 1 INJECTION INTRAMUSCULAR; INTRAVENOUS at 15:09

## 2017-07-07 RX ADMIN — Medication 325 MG: at 08:17

## 2017-07-07 RX ADMIN — FUROSEMIDE 40 MG: 40 TABLET ORAL at 17:01

## 2017-07-07 RX ADMIN — INSULIN ASPART 6 UNITS: 100 INJECTION, SUSPENSION SUBCUTANEOUS at 17:01

## 2017-07-07 RX ADMIN — SODIUM CHLORIDE 75 ML/HR: 0.9 INJECTION, SOLUTION INTRAVENOUS at 01:14

## 2017-07-07 RX ADMIN — SUCRALFATE 1000 MG: 1 SUSPENSION ORAL at 23:19

## 2017-07-07 RX ADMIN — ACETAMINOPHEN 650 MG: 325 TABLET, FILM COATED ORAL at 21:22

## 2017-07-07 RX ADMIN — BISACODYL 5 MG: 5 TABLET, COATED ORAL at 17:00

## 2017-07-07 RX ADMIN — SOTALOL HYDROCHLORIDE 80 MG: 80 TABLET ORAL at 08:17

## 2017-07-07 RX ADMIN — FUROSEMIDE 40 MG: 10 INJECTION, SOLUTION INTRAMUSCULAR; INTRAVENOUS at 11:10

## 2017-07-07 NOTE — ASSESSMENT & PLAN NOTE
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

## 2017-07-07 NOTE — BRIEF OP NOTE (RAD/CATH)
Liver biopsy Procedure Note    PATIENT NAME: Elba Mullins  : 1928  MRN: [id number]     Pre-op Diagnosis:   1  Pleural effusion, left    2  Nausea    3  Fatigue    4  Loss of appetite    5  Hiatal hernia    6  Pancreatic mass    7  Liver lesion      Post-op Diagnosis:   1  Pleural effusion, left    2  Nausea    3  Fatigue    4  Loss of appetite    5  Hiatal hernia    6  Pancreatic mass    7  Liver lesion        Surgeon:   Nii Campbell MD  Assistants:     No qualified resident was available, Resident is only observing    Estimated Blood Loss: none  Findings: Ultrasound guided core of left hepatic nodule  with Dstat    Specimens: left hepatic nodule x 4    Complications:  None    Anesthesia: Conscious sedation and Local    Nii Campbell MD     Date: 2017  Time: 3:42 PM

## 2017-07-07 NOTE — PROGRESS NOTES
Patient arrived to IR for image guided biopsy of a liver nodule  CT was reviewed  Plan for ultrasound guided biopsy with CT as back up  The procedure and risks were discussed with the patient  All questions were answered  Informed consent was obtained

## 2017-07-07 NOTE — PROGRESS NOTES
Physical Therapy Cancellation Note  Attempted to see pt in am however he reports he is going for a biopsy today and does not want to get out of bed will cancel and continue to follow      Bailey Berumen, ANEL

## 2017-07-07 NOTE — ASSESSMENT & PLAN NOTE
Assessment: Exudative (L)   Plan:   · Status post thoracentesis for 525 cc of serosanguineous fluid  Fluid analysis negative for malignancy but lymphocyte predominance   · Pulmonary is following    · CT chest reveal RLL mass  · Suspect underlying GI malignancy, for IR biopsy today  · On room air, and stable from pulmonary standpoint

## 2017-07-07 NOTE — ASSESSMENT & PLAN NOTE
Plan:   · Rate controlled on sotalol  · INR 1 37, to IR for bx of abdominal mass today    · Resume coumadin tonight  · INR monitoring as outpatient

## 2017-07-07 NOTE — PROGRESS NOTES
Pt DTV at 2300 after third straight cath attempt  Pt unable to void and bladder scanned for >802 ml  SLIM notified and ordered another straight cath and wants to monitor and see how he does tomorrow

## 2017-07-07 NOTE — ASSESSMENT & PLAN NOTE
Plan:   · With worsening nausea and loss of appetite over the past month  · Continue Protonix b i d and carafate  · GI input appreciated, s/p EGD with bx revealing chronic gastritis and hiatal hernia    · Surgery was consulted by GI regarding hiatal hernia however he is a poor surgical candidate so there will be no plan for surgical intervention at this time, outpatient follow up    · Continue diet as tolerated  · Pain control

## 2017-07-07 NOTE — ASSESSMENT & PLAN NOTE
Plan:   · Straight cathed at least 4 times   · Will d/c IVF  · Resume lasix, give 1x dose of Lasix 40IV now   · Initiated Flomax   · If continues to retain will insert tatum, discussed with patient and RN

## 2017-07-07 NOTE — ASSESSMENT & PLAN NOTE
Plan:   · Hemoglobin A1c 7 9  · Blood sugars reviewed  · Continue current insulin regimen, continue current insulin regimen due to decreased appetite   · Continue sliding scale insulin and Accu-Cheks    · Continue to monitor PO intake

## 2017-07-07 NOTE — PROGRESS NOTES
Progress Note - Nisa Longoria 80 y o  male MRN: [id number]    Unit/Bed#: 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  · Patient wishes to pursue IR biopsy of liver lesion  · INR < 1 5 today, s/p 5mg vit K yesterday  To IR for bx  · Appreciate heme/onc input   · Outpatient follow up has been arranged         GERD (gastroesophageal reflux disease)   Assessment & Plan     Plan:   · With worsening nausea and loss of appetite over the past month  · Continue Protonix b i d and carafate  · GI input appreciated, s/p EGD with bx revealing chronic gastritis and hiatal hernia    · Surgery was consulted by GI regarding hiatal hernia however he is a poor surgical candidate so there will be no plan for surgical intervention at this time, outpatient follow up  · Continue diet as tolerated  · Pain control        Pleural effusion   Assessment & Plan     Assessment: Exudative (L)   Plan:   · Status post thoracentesis for 525 cc of serosanguineous fluid  Fluid analysis negative for malignancy but lymphocyte predominance   · Pulmonary is following  · CT chest reveal RLL mass  · Suspect underlying GI malignancy, for IR biopsy today  · On room air, and stable from pulmonary standpoint         Urinary retention   Assessment & Plan    Plan:   · Straight cathed at least 4 times   · Will d/c IVF  · Resume lasix, give 1x dose of Lasix 40IV now   · Initiated Flomax   · If continues to retain will insert tatum, discussed with patient and RN        Right lower lobe lung mass   Assessment & Plan      · Patient had thoracentesis which results were negative for malignancy however showed high percentage of lymphocytes  · Pulmonary input appreciated  · CAT scan of the A/P noted  · This is likely a malignant effusion         A-fib   Assessment & Plan     Plan:   · Rate controlled on sotalol    · INR 1 37, to IR for bx of abdominal mass today  · Resume coumadin tonight  · INR monitoring as outpatient             Loss of appetite   Assessment & Plan     Plan:   · Remeron has been initiated   · NPO now for IR biopsy today         DM2 (diabetes mellitus, type 2)   Assessment & Plan     Plan:   · Hemoglobin A1c 7 9  · Blood sugars reviewed  · Continue current insulin regimen, continue current insulin regimen due to decreased appetite   · Continue sliding scale insulin and Accu-Cheks  · Continue to monitor PO intake           VTE Pharmacologic Prophylaxis:   Pharmacologic: Warfarin (Coumadin)  Mechanical VTE Prophylaxis in Place: No    Patient Centered Rounds: I have performed bedside rounds with nursing staff today  Discussions with Specialists or Other Care Team Provider: nursing, case management     Education and Discussions with Family / Patient: patient     Time Spent for Care: 30 minutes  More than 50% of total time spent on counseling and coordination of care as described above  Current Length of Stay: 9 day(s)    Current Patient Status: Inpatient   Certification Statement: The patient will continue to require additional inpatient hospital stay due to IR biopsy today, urinary retention     Discharge Plan: pending biopsy today, likely discharge within next 24 hours     Code Status: Level 3 - DNAR and DNI      Subjective:   Patient seen and examined at bedside  disucssed with nursing  Patient feels well  Gets upset when we talk about possibility of malignancy  Eager to have biopsy  States he is having difficulty urinating, states he has the urge  No chest pain or SOB  Objective:     Vitals:   Temp (24hrs), Av 3 °F (36 8 °C), Min:98 2 °F (36 8 °C), Max:98 3 °F (36 8 °C)    HR:  [66-71] 66  Resp:  [18] 18  BP: ()/(54-62) 132/62  SpO2:  [94 %-95 %] 94 %  Body mass index is 26 11 kg/m²  Input and Output Summary (last 24 hours):        Intake/Output Summary (Last 24 hours) at 17 1144  Last data filed at 07/07/17 0153   Gross per 24 hour   Intake          1258 75 ml   Output              475 ml   Net           783 75 ml       Physical Exam:     Physical Exam   Constitutional: He is oriented to person, place, and time  He appears well-developed and well-nourished  No distress  HENT:   Head: Normocephalic and atraumatic  Mouth/Throat: No oropharyngeal exudate  Eyes: EOM are normal  No scleral icterus  Neck: Normal range of motion  Neck supple  Cardiovascular: Normal rate and regular rhythm  Murmur heard  Pulmonary/Chest: Effort normal and breath sounds normal  No respiratory distress  He has no wheezes  He has no rales  Abdominal: Soft  Bowel sounds are normal    Musculoskeletal: Normal range of motion  He exhibits edema (trace b/l LE edema )  Neurological: He is alert and oriented to person, place, and time  Skin: Skin is warm and dry  There is pallor  Psychiatric: He has a normal mood and affect  Additional Data:     Labs:      Results from last 7 days  Lab Units 07/07/17  1110   WBC Thousand/uL 3 58*   HEMOGLOBIN g/dL 10 0*   HEMATOCRIT % 31 8*   PLATELETS Thousands/uL 111*       Results from last 7 days  Lab Units 07/07/17  0607  07/02/17  0446   SODIUM mmol/L 143  < > 141   POTASSIUM mmol/L 4 0  < > 3 9   CHLORIDE mmol/L 112*  < > 109*   CO2 mmol/L 25  < > 27   BUN mg/dL 32*  < > 28*   CREATININE mg/dL 1 71*  < > 1 74*   CALCIUM mg/dL 8 3  < > 8 2*   TOTAL PROTEIN g/dL  --   --  5 6*   BILIRUBIN TOTAL mg/dL  --   --  0 43   ALK PHOS U/L  --   --  73   ALT U/L  --   --  18   AST U/L  --   --  30   GLUCOSE RANDOM mg/dL 98  < > 170*   < > = values in this interval not displayed  Results from last 7 days  Lab Units 07/07/17  0607   INR  1 37*       * I Have Reviewed All Lab Data Listed Above  * Additional Pertinent Lab Tests Reviewed:  All Labs For Current Hospital Admission Reviewed    Imaging:    Imaging Reports Reviewed Today Include: none  Imaging Personally Reviewed by Myself Includes:  none    Recent Cultures (last 7 days):           Last 24 Hours Medication List:     acetaminophen 650 mg Oral Q8H [facility]   aspirin 81 mg Oral Once per day on Mon Thu   bisacodyl 5 mg Oral BID   ferrous sulfate 325 mg Oral BID   fluticasone-salmeterol 1 puff Inhalation Q12H DEMIAN   furosemide 40 mg Oral BID (diuretic)   insulin aspart protamine-insulin aspart 6 Units Subcutaneous Before Dinner   insulin aspart protamine-insulin aspart 7 Units Subcutaneous Early Morning   insulin lispro 1-5 Units Subcutaneous HS   insulin lispro 1-6 Units Subcutaneous TID AC   mirtazapine 15 mg Oral HS   oxyCODONE 2 5 mg Oral BID   pantoprazole 40 mg Oral BID AC   sotalol 80 mg Oral Daily   sucralfate 1,000 mg Oral Q6H DEMIAN   tamsulosin 0 4 mg Oral Daily With Dinner   warfarin 5 mg Oral Once (warfarin)   zolpidem 5 mg Oral HS        Today, Patient Was Seen By: Harley Shin PA-C    ** Please Note: Dragon 360 Dictation voice to text software may have been used in the creation of this document   **

## 2017-07-08 VITALS
HEIGHT: 69 IN | WEIGHT: 176.81 LBS | RESPIRATION RATE: 18 BRPM | HEART RATE: 70 BPM | TEMPERATURE: 98.7 F | SYSTOLIC BLOOD PRESSURE: 98 MMHG | DIASTOLIC BLOOD PRESSURE: 47 MMHG | BODY MASS INDEX: 26.19 KG/M2 | OXYGEN SATURATION: 93 %

## 2017-07-08 LAB
ERYTHROCYTE [DISTWIDTH] IN BLOOD BY AUTOMATED COUNT: 15 % (ref 11.6–15.1)
GLUCOSE SERPL-MCNC: 111 MG/DL (ref 65–140)
GLUCOSE SERPL-MCNC: 113 MG/DL (ref 65–140)
HCT VFR BLD AUTO: 30.7 % (ref 36.5–49.3)
HGB BLD-MCNC: 9.5 G/DL (ref 12–17)
INR PPP: 1.28 (ref 0.86–1.16)
MCH RBC QN AUTO: 29 PG (ref 26.8–34.3)
MCHC RBC AUTO-ENTMCNC: 30.9 G/DL (ref 31.4–37.4)
MCV RBC AUTO: 94 FL (ref 82–98)
PLATELET # BLD AUTO: 115 THOUSANDS/UL (ref 149–390)
PMV BLD AUTO: 10.5 FL (ref 8.9–12.7)
PROTHROMBIN TIME: 16.1 SECONDS (ref 12.1–14.4)
RBC # BLD AUTO: 3.28 MILLION/UL (ref 3.88–5.62)
WBC # BLD AUTO: 3.28 THOUSAND/UL (ref 4.31–10.16)

## 2017-07-08 PROCEDURE — 85027 COMPLETE CBC AUTOMATED: CPT | Performed by: PHYSICIAN ASSISTANT

## 2017-07-08 PROCEDURE — 82948 REAGENT STRIP/BLOOD GLUCOSE: CPT

## 2017-07-08 PROCEDURE — 85610 PROTHROMBIN TIME: CPT | Performed by: PHYSICIAN ASSISTANT

## 2017-07-08 RX ORDER — INSULIN ASPART 100 [IU]/ML
7 INJECTION, SUSPENSION SUBCUTANEOUS
Qty: 1 ML | Refills: 0
Start: 2017-07-08

## 2017-07-08 RX ORDER — SUCRALFATE ORAL 1 G/10ML
1000 SUSPENSION ORAL EVERY 6 HOURS SCHEDULED
Qty: 420 ML | Refills: 0 | Status: SHIPPED | OUTPATIENT
Start: 2017-07-08

## 2017-07-08 RX ORDER — WARFARIN SODIUM 5 MG/1
5 TABLET ORAL
Status: DISCONTINUED | OUTPATIENT
Start: 2017-07-08 | End: 2017-07-08 | Stop reason: HOSPADM

## 2017-07-08 RX ORDER — HYDROCODONE BITARTRATE AND ACETAMINOPHEN 5; 325 MG/1; MG/1
1 TABLET ORAL EVERY 6 HOURS PRN
Qty: 20 TABLET | Refills: 0 | Status: SHIPPED | OUTPATIENT
Start: 2017-07-08 | End: 2017-07-18

## 2017-07-08 RX ORDER — TAMSULOSIN HYDROCHLORIDE 0.4 MG/1
0.4 CAPSULE ORAL
Qty: 30 CAPSULE | Refills: 0 | Status: SHIPPED | OUTPATIENT
Start: 2017-07-08

## 2017-07-08 RX ORDER — MIRTAZAPINE 15 MG/1
15 TABLET, FILM COATED ORAL
Qty: 30 TABLET | Refills: 0 | Status: SHIPPED | OUTPATIENT
Start: 2017-07-08

## 2017-07-08 RX ORDER — PANTOPRAZOLE SODIUM 40 MG/1
40 TABLET, DELAYED RELEASE ORAL 2 TIMES DAILY
Qty: 60 TABLET | Refills: 0 | Status: SHIPPED | OUTPATIENT
Start: 2017-07-08

## 2017-07-08 RX ADMIN — PANTOPRAZOLE SODIUM 40 MG: 40 TABLET, DELAYED RELEASE ORAL at 06:28

## 2017-07-08 RX ADMIN — FLUTICASONE PROPIONATE AND SALMETEROL 1 PUFF: 50; 250 POWDER RESPIRATORY (INHALATION) at 09:30

## 2017-07-08 RX ADMIN — ACETAMINOPHEN 650 MG: 325 TABLET, FILM COATED ORAL at 13:03

## 2017-07-08 RX ADMIN — SUCRALFATE 1000 MG: 1 SUSPENSION ORAL at 11:39

## 2017-07-08 RX ADMIN — OXYCODONE HYDROCHLORIDE 2.5 MG: 5 TABLET ORAL at 09:29

## 2017-07-08 RX ADMIN — ACETAMINOPHEN 650 MG: 325 TABLET, FILM COATED ORAL at 06:22

## 2017-07-08 RX ADMIN — SOTALOL HYDROCHLORIDE 80 MG: 80 TABLET ORAL at 09:29

## 2017-07-08 RX ADMIN — FUROSEMIDE 40 MG: 40 TABLET ORAL at 09:28

## 2017-07-08 RX ADMIN — SUCRALFATE 1000 MG: 1 SUSPENSION ORAL at 06:22

## 2017-07-08 RX ADMIN — Medication 325 MG: at 09:29

## 2017-07-08 RX ADMIN — INSULIN ASPART 7 UNITS: 100 INJECTION, SUSPENSION SUBCUTANEOUS at 06:22

## 2017-07-08 RX ADMIN — BISACODYL 5 MG: 5 TABLET, COATED ORAL at 09:28

## 2017-07-08 NOTE — DISCHARGE SUMMARY
Discharge Summary - [facility]    Patient Information: Maximilian Nolan 80 y o  male MRN: [id number]  Unit/Bed#: [facility] 523-32 Encounter: [id number]    Discharging Physician / Practitioner: Guerda Polanco PA-C  PCP: Jp Moses MD  Admission Date: 6/25/2017  Discharge Date: 07/08/17    Reason for Admission: poor appetite, epigastric pain     Discharge Diagnoses:     Principal Problem:    Nausea  Active Problems:    GERD (gastroesophageal reflux disease)    Pancreatic mass    Pleural effusion    DM2 (diabetes mellitus, type 2)    Loss of appetite    A-fib    Right lower lobe lung mass    Liver lesion    Urinary retention  Resolved Problems:    Acute tracheobronchitis      Consultations During Hospital Stay:  · Gastroenterology  · General surgery   · Interventional radiology  · Pulmonology   · Oncology/hematology  · PT/OT    Procedures Performed:     · 6/26-Large pleural effusion (L) developing since 5/29/17 with probably atelectasis and/or pna at LLL base  1cm nodule in RLL  · 6/26 IR thoracentesis--Approximately 525 mL of dark serosanguineous fluid was aspirated  Pleural fluid was obtained and sent for multiple laboratory tests as ordered  Neg for malignant cells but with lymphocytosis  Likely malignant in nature  · 6/26--CT chest-Multiple hypodense lesions in the liver most suspicious for metastatic disease  4 1 cm soft tissue mass in the periportal region, representing metastatic adenopathy, or possibly pancreatic neoplasm  Stable benign right lung nodules  Minimal left pleural effusion  Calcified pleural plaques consistent with prior asbestos related disease  Stable large hiatal hernia with intrathoracic stomach  · 6/28--large hiatal hernia visible, biopsies revealing chronic gastritis  · 6/29-CT abd/pelvs--Multiple liver lesions not consistent with cysts likely metastatic disease  MRI liver or biopsy recommended  Soft tissue in the rose marie hepatis region measuring 4 1 x 2 4 cm could represent adenopathy or a pancreatic head mass  There is no pancreatic tissue identified otherwise  Biopsy should be considered  Large hiatal hernia containing the entire stomach compressing the left lung base  Left pleural effusion  Stable benign right lower lobe nodule  Gallstones without surrounding inflammation  · Evidence of portal hypertension with splenomegaly and multiple vascular shunts  · 7/7 IR image guided bx/aspiration of liver met--Successful biopsy of the left liver metastasis    Significant Findings / Test Results:     · L pleural effusion, lymphocyte predominant, likely malignant in nature   · RLL lung nodule, stable  · Pancytopenia  · Mass at pancreatic head, liver mets   · Poor appetite   · Large hiatal hernia   · Chronic gastritis   · Pancytopenia     Incidental Findings:   · Left pleural effusion   · Pancreatic mass/liver mets      Test Results Pending at Discharge (will require follow up): · Pending biopsy of liver mets      Outpatient Tests Requested:  · Repeat PT INR on Monday  · Repeat CBC in week     Complications:  None     Hospital Course:     Meghan Arreola is a 80 y o  male patient with past medical history significant for GERD, type 2 diabetes mellitus who originally presented to the hospital on 6/25/2017 due to loss of appetite  Patient was recently admitted from May 29 to June 2 for acute tracheal bronchitis received a short course of steroids and was discharged home  Patient stated upon re-admission that he felt sick one over looking at food and was experiencing mild epigastric pain  Patient was incidentally found to have a large left pleural effusion and underwent IR diagnostic and therapeutic thoracentesis  Pulmonology was consulted  Pleural fluid studies revealed a left exudative lymphocyte predominant effusion that was negative for malignancy and CT of the chest revealed a stable right lower lobe lung mass   Given this, CT of the abdomen and pelvis was performed and suggested a pancreatic head mass with metastases to the liver  Pulmonology felt hospice care was appropriate  Discussion was held between the patient, son, primary team and patient wishes to pursue a biopsy of abdominal mass and to follow-up results prior to making any decisions regarding hospice or palliative care  Oncology was consulted for patient's pancytopenia and likely new metastatic disease  Biopsy was ordered and patient directed to follow up with him in the outpatient setting  An appointment has been arranged for July 25 to go over biopsy results and to devise a further plan  Gastroenterology was also consulted and they recommended EGD to patient's indigestion and persistent nausea  EGD revealed large hiatal hernia likely a cause of the patient's nausea as well as chronic gastritis  Biopsies of gastritis negative for H  pylori and likely the patient now has chronic gastritis secondary to his hernia  They recommend continuing Protonix daily and Carafate  Surgical consult for niacin fundoplication was recommended  Surgery did not feel patient was a candidate for surgical intervention at this time  He has high surgical risk and they recommend he follows up with them as an outpatient  Patient is recommended to continue Remeron for his poor appetite in addition to Protonix twice a day and Carafate  Norco to be continued for pain, as pain is likely secondary to hiatal hernia as well as abdominal mass  Patient to continue iron supplementation  Follow-up with oncology/hematology is most important to go over biopsy review results and to devise further plan  Patient is to continue diet as tolerated, likely smaller meals, and avoiding triggers for acid reflux like acidic foods, caffeine, etc  Insulin was adjusted due to poor appetite  Patient's Coumadin was on hold so he could have IR biopsy of liver mass   He is recommended to take increased dose of coumadin, 5mg for the next two nights and to have INR repeated on Monday  Condition at Discharge: stable     Discharge Day Visit / Exam:     Subjective:  Patient seen and examined at bedside  Patient still with some epigastric "discomfort" but we discussed that this is likely due to his underlying abdominal mass and hiatal hernia  He is concerned he wont have an accommodating diet at [employer]  Vitals: Blood Pressure: (!) 98/47 (07/08/17 0708)  Pulse: 70 (07/08/17 0708)  Temperature: 98 7 °F (37 1 °C) (07/08/17 0708)  Temp Source: Oral (07/08/17 0708)  Respirations: 18 (07/08/17 0708)  Height: 5' 9" (175 3 cm) (Stated ) (06/26/17 1249)  Weight - Scale: 80 2 kg (176 lb 12 9 oz) (06/26/17 1249)  SpO2: 93 % (07/08/17 0708)    Exam:   Physical Exam   Constitutional: He is oriented to person, place, and time  He appears well-developed and well-nourished  No distress  HENT:   Head: Normocephalic and atraumatic  Mouth/Throat: Oropharynx is clear and moist    Eyes: EOM are normal  No scleral icterus  Neck: Normal range of motion  Neck supple  Cardiovascular: Normal rate and regular rhythm  Murmur heard  Pulmonary/Chest: Effort normal and breath sounds normal  No respiratory distress  He has no wheezes  He has no rales  On room air    Abdominal: Soft  Bowel sounds are normal    Musculoskeletal: Normal range of motion  He exhibits no edema  Neurological: He is alert and oriented to person, place, and time  Skin: Skin is warm and dry  Psychiatric: He has a normal mood and affect  Frustrated        Discharge instructions/Information to patient and family:   See after visit summary for information provided to patient and family  Provisions for Follow-Up Care:  See after visit summary for information related to follow-up care and any pertinent home health orders        Disposition:     Short Term Rehab or Skilled Nursing at Other SNF Facility: [address] [place] [address] to [facility] SNF:   · [facility]  - Not Applicable to this Patient    Planned Readmission: none     Discharge Statement:  I spent 35 minutes discharging the patient  This time was spent on the day of discharge  I had direct contact with the patient on the day of discharge  Greater than 50% of the total time was spent examining patient, answering all patient questions, arranging and discussing plan of care with patient as well as directly providing post-discharge instructions  Additional time then spent on discharge activities  Discharge Medications:  See after visit summary for reconciled discharge medications provided to patient and family        ** Please Note: This note has been constructed using a voice recognition system **

## 2017-07-08 NOTE — DISCHARGE INSTRUCTIONS
Please follow up with Dr Radha Samuel with oncology on July 25 as scheduled to go over biopsy results  Please follow up with surgery  Follow up with pcp in 2 weeks    Please take increased dose of coumadin tonight and tomorrow, have INR checked on Monday  Have repeat CBC in 1 week  The medication, flomax was added to help with urinary retention  Percutaneous Liver Biopsy   WHAT YOU NEED TO KNOW:   A PLB is a procedure to remove a sample of tissue from your liver  The sample can be sent to a lab and tested for liver disease, cancer, or infection  After the procedure you may have pain and bruising at the biopsy site  You may also have pain in your right shoulder  These symptoms should get better in 48 to 72 hours  DISCHARGE INSTRUCTIONS:     [facility] and [facility] patients,  Contact Interventional Radiology at [phone] PATIENTS: Contact Interventional Radiology at [phone]   [place] PATIENTS: Contact Interventional Radiology at [phone] if:  · You cannot stop the bleeding from your wound even after you hold firm pressure for 10 minutes  · Blood soaks through your bandage  · You have severe pain in your abdomen  · Your abdomen is larger than usual and feels hard  · Your neck is more swollen and you have trouble swallowing  · You feel weak or dizzy  · Your heart is beating faster than usual    · You have a fever or chills  · Your pain does not get better after you take pain medicine  · Your wound is red, swollen, or draining pus  · You have nausea or are vomiting  · Your skin is itchy, swollen, or you have a rash  · You have questions or concerns about your condition or care  Medicines:   · Acetaminophen decreases pain and fever  It is available without a doctor's order  Acetaminophen can cause liver damage if not taken correctly  · Take your home medicine as directed  · Resume your normal diet    Self-care:   · Rest as directed  Do not play sports, exercise, or lift anything heavier than 5 pounds for up to 1 week  · Drink liquids as directed  Liquids will help flush the contrast liquid out of your body  Ask how much liquid to drink each day and which liquids are best for you  · Apply firm, steady pressure if bleeding occurs  A small amount of bleeding from your wound is possible  Apply pressure with a clean gauze or towel for 5 to 10 minutes  Call 911 if bleeding becomes heavy or does not stop  · Ask your healthcare provider when to take your blood thinner or antiplatelet medicine  You may need to wait 24 to 72 hours to take your medicine  This will prevent bleeding  Follow up with your healthcare provider as directed: Write down your questions so you remember to ask them during your visits  HOSPITAL COURSE BELOW FOR FACILITY:     Consultations During Hospital Stay:  · Gastroenterology  · General surgery   · Interventional radiology  · Pulmonology   · Oncology/hematology  · PT/OT    Procedures Performed:     · 6/26-Large plerual effusion (L) developing since 5/29/17 with probably atelectasis and/or pna at LLL base  1cm nodule in RLL  · 6/26 IR thoracentesis--Approximately 525 mL of dark serosanguineous fluid was aspirated  Pleural fluid was obtained and sent for multiple laboratory tests as ordered  Neg for malignant cells but with lymphocytosis  Likely malignant in nature  · 6/26--CT chest-Multiple hypodense lesions in the liver most suspicious for metastatic disease  4 1 cm soft tissue mass in the periportal region, representing metastatic adenopathy, or possibly pancreatic neoplasm  Stable benign right lung nodules  Minimal left pleural effusion  Calcified pleural plaques consistent with prior asbestos related disease  Stable large hiatal hernia with intrathoracic stomach    · 6/28--large hiatal hernia visible, biopsies revealing chronic gastritis  · 6/29-CT abd/pelvs--Multiple liver lesions not consistent with cysts likely metastatic disease  MRI liver or biopsy recommended  Soft tissue in the rose marie hepatis region measuring 4 1 x 2 4 cm could represent adenopathy or a pancreatic head mass  There is no pancreatic tissue identified otherwise  Biopsy should be considered  Large hiatal hernia containing the entire stomach compressing the left lung base  Left pleural effusion  Stable benign right lower lobe nodule  Gallstones without surrounding inflammation  · Evidence of portal hypertension with splenomegaly and multiple vascular shunts  · 7/7 IR image guided bx/aspiration of liver met--Successful biopsy of the left liver metastasis    Significant Findings / Test Results:     · L pleural effusion, lymphocyte predominant, likely malignant in nature   · RLL lung nodule, stable  · Pancytopenia  · Mass at pancreatic head, liver mets   · Poor appetite   · Large hiatal hernia   · Chronic gastritis   · Pancytopenia     Incidental Findings:   · Left pleural effusion   · Pancreatic mass/liver mets      Test Results Pending at Discharge (will require follow up): · Pending biopsy of liver mets      Outpatient Tests Requested:  · Repeat PT INR on Monday  · Repeat CBC in week     Complications:  None     Hospital Course:     Denice Demarco is a 80 y o  male patient with past medical history significant for GERD, type 2 diabetes mellitus who originally presented to the hospital on 6/25/2017 due to loss of appetite  Patient was recently admitted from May 29 to June 2 for acute tracheal bronchitis received a short course of steroids and was discharged home  Patient stated upon re-admission that he felt sick one over looking at food and was experiencing mild epigastric pain  Patient was incidentally found to have a large left pleural effusion and underwent IR diagnostic and therapeutic thoracentesis  Pulmonology was consulted   Pleural fluid studies revealed a left exudative lymphocyte predominant effusion that was negative for malignancy and CT of the chest revealed a stable right lower lobe lung mass  Given this, CT of the abdomen and pelvis was performed and suggested a pancreatic head mass with metastases to the liver  Pulmonology felt hospice care was appropriate  Discussion was held between the patient, son, primary team and patient wishes to pursue a biopsy of abdominal mass and to follow-up results prior to making any decisions regarding hospice or palliative care  Oncology was consulted for patient's pancytopenia and likely new metastatic disease  Biopsy was ordered and patient directed to follow up with him in the outpatient setting  An appointment has been arranged for July 25 to go over biopsy results and to devise a further plan  Gastroenterology was also consulted and they recommended EGD to patient's indigestion and persistent nausea  EGD revealed large hiatal hernia likely a cause of the patient's nausea as well as chronic gastritis  Biopsies of gastritis negative for H  pylori and likely the patient now has chronic gastritis secondary to his hernia  They recommend continuing Protonix daily and Carafate  Surgical consult for niacin fundoplication was recommended  Surgery did not feel patient was a candidate for surgical intervention at this time  He has high surgical risk and they recommend he follows up with them as an outpatient  Patient is recommended to continue Remeron for his poor appetite in addition to Protonix twice a day and Carafate  Norco to be continued for pain, as pain is likely secondary to hiatal hernia as well as abdominal mass  Patient to continue iron supplementation  Follow-up with oncology/hematology is most important to go over biopsy review results and to devise further plan  Patient is to continue diet as tolerated, likely smaller meals, and avoiding triggers for acid reflux like acidic foods, caffeine, etc  Insulin was adjusted due to poor appetite       Patient's Coumadin was on hold so he could have IR biopsy of liver mass  He is recommended to take increased dose of coumadin, 5mg for the next two nights and to have INR repeated on Monday

## 2017-07-08 NOTE — SOCIAL WORK
Cm informed patient medically stable for discharge  Cm contacted [facility] spoke with Deja Morrow who reported no concerns with patient returning, confirmed phone for report  and fax:[phone]  Cm contacted elis Rojas and confirmed that He[facility] Rojas would transport patient back to facility at 130p this day  Cm informed attending and nurse of transport time, location and contact number for report  No additional needs at this time

## 2017-07-25 ENCOUNTER — ALLSCRIPTS OFFICE VISIT (OUTPATIENT)
Dept: OTHER | Facility: OTHER | Age: 82
End: 2017-07-25

## 2017-08-14 ENCOUNTER — ALLSCRIPTS OFFICE VISIT (OUTPATIENT)
Dept: OTHER | Facility: OTHER | Age: 82
End: 2017-08-14

## 2017-08-14 LAB
MYCOBACTERIUM SPEC CULT: NORMAL
RHODAMINE-AURAMINE STN SPEC: NORMAL

## 2017-08-18 ENCOUNTER — ALLSCRIPTS OFFICE VISIT (OUTPATIENT)
Dept: OTHER | Facility: OTHER | Age: 82
End: 2017-08-18

## 2017-10-02 DIAGNOSIS — I73.9 PERIPHERAL VASCULAR DISEASE (HCC): ICD-10-CM

## 2018-01-11 NOTE — PROCEDURES
Procedures by Carola Joseph PA-C at 6/26/2017  12:48 PM      Author:  Carola Joseph PA-C Service:  Interventional Radiology  Author Type:  Physician Assistant    Filed:  6/26/2017 12:51 PM Date of Service:  6/26/2017 12:48 PM Status:  Attested    :  Carola Joseph PA-C (Physician Assistant)  Cosigner:  Daniel Marcos MD at 6/27/2017  9:09 AM      Procedure Orders:       1  Thoracentesis [[id number]] ordered by Carola Joseph PA-C at 06/26/17 1248                 Post-procedure Diagnoses:       1  Pleural effusion, left [J90]              Attestation signed by Daniel Marcos MD at 6/27/2017  9:09 AM      I have reviewed the notes, assessments, and/or procedures performed by Elaine, I concur with her/his documentation of Charly Santos  Thoracentesis  Date/Time: 6/26/2017 12:48 PM  Performed by: Ananth Valencia by: Delfina Ken     Patient location: Interventional Radiology  Other Assisting  Provider: Yes (comment) (Dr Daniel Marcos)    Consent:     Consent obtained:  Written    Consent given by:  Patient    Risks discussed:  Incomplete drainage, bleeding, infection, pain and pneumothorax    Alternatives discussed:  Alternative treatment, delayed treatment and observation  Universal protocol:     Procedure explained and questions answered to patient or proxy's satisfaction: yes      Relevant documents present and verified: yes      Test results available and properly labeled: yes       Imaging studies available: yes      Required blood products, implants, devices and special equipment available: yes      Site/side marked: yes      Immediately prior to procedure a time out was called: yes      Patient identity confirmed:  Verbally with patient, arm band and provided demographic data  Indications:     Procedure Purpose: diagnostic      Indications: pleural effusion      Indications comment:  Left  Anesthesia (see MAR for exact dosages):      Anesthesia method:  Local infiltration Local anesthetic:  Lidocaine 1% w/o epi  Procedure details:     Preparation: Patient was prepped and draped in usual sterile fashion      Standard thoracentesis cath kit used: Yes (5 Fr x 7 cm [place])      Patient position:  Sitting    Laterality:  Unilateral and left    Location:  Midscapular line    Puncture method:  Over-the-needle catheter    Ultrasound guidance: yes      Reason for ultrasound: Identify fluid collection and guide cathetar placement  Images stored locally on hard drive      Indwelling  catheter placed: no      Number of attempts:  1    Drainage characteristics:  Serosanguinous (dark)    Fluid removed amount:  525 mL  Post-procedure details:     Patient tolerance of procedure: Tolerated well, no immediate complications  Comments:      Pleural fluid was obtained and sent for multiple laboratory tests as ordered                    Received for:Kala Henry [facility]  Jun 27 2017  9:09AM [facility] Standard Time

## 2018-01-11 NOTE — PROGRESS NOTES
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   (250 70,443 81,785 4) (E11 52)   6  Venous insufficiency (chronic) (peripheral) (459 81) (I87 2)   7  Chronic venous hypertension w ulceration, right (459 31) (I87 311)    Plan  Chronic venous hypertension w ulceration, right    · Collagenase (Santyl) instructions given; Status:Complete;   Done: 80KOE4166   Ordered; For:Chronic venous hypertension w ulceration, right; Ordered By:Cassie Thapa;   · Wound care as instructed ; Status:Complete;   Done: 99RLW8632   Ordered; For:Chronic venous hypertension w ulceration, right; Ordered By:Cassie Thapa;   · Follow-up visit in 2 weeks Evaluation and Treatment  Follow-up  Status: Complete  Done:  82NHS4610   Ordered; For: Chronic venous hypertension w ulceration, right; Ordered By: Kristopher Turner Performed:  Due: 92ERF3052; Last Updated By: Mandy Astorga; 2/4/2016 1:32:57 PM    Wound Care Orders/Instructions    Wound Identification and Instructions   Wound #1: right heel  use: Normal Saline   Wound Care Instructions  Discussed with Patient/Caregiver  Dressing Type: Hydrogel  Wash with mild soap and water, normal saline, wound cleanser or as specified  Apply 4% Topical Lidocaine anesthetic solution PRN to wound/ulcer prior to debridement for pain control  Apply specified dressing to wound base/bed  To periwound apply: Vaseline  Secondary dressing apply: Gauze, 4x4  Secure with: Kerlix, and tape  Dressing change frequency: Daily  Offloading: Global pedi shoe  Comments/Other:   Santyl daily to wound and Vaseline to ashley wound at home, hydrogel applied at [facility] today     Wound #2: right posterior lower extremity  use: Normal Saline   Wound Care Instructions  Discussed with Patient/Caregiver  Wash with mild soap and water, normal saline, wound cleanser or as specified  Apply 4% Topical Lidocaine anesthetic solution PRN to wound/ulcer prior to debridement for pain control  Apply specified dressing to wound base/bed  Secondary dressing apply: Meplex border  Dressing change frequency: Twice per week      Wound Goals  Wound Goals:   Healing Goals:   Fair healing potential, expect slow healing progress secondary to co-morbid conditions and advanced age  Wound will be free of infection   Patient will achieve appropriate pressure redistribution for wound prevention       Chief Complaint  Follow up for RLE wounds      History of Present Illness    Wound Identification HPI   Wound #1: right heel    Wound #2: right posterior lower extremity          The patient came to Wound Care via wheelchair, walker  The patient is being seen for a follow-up with MD at the [address] [place] [address]  The patient is accompanied by his son  The patient's identification was verified  A secondary verification process was completed  Orientation: oriented to person, oriented to place and oriented to time  Blood Glucose:  146 mg/dL as reported by patient  10/22/2015: Patient was referred by [facility]  Patient is currently at [facility], but will be going home on Sat  10/24/2015  8/20/2015 patient reports he was coming out of the bathroom and fell  Patient fractured Right hip  Patient was admitted to [facility]  Right hip repair done around 8/24/2015  11/5/15: Patient arrived with dressing intact to wound  Patient has appointment scheduled for Doppler's for tomorrow  Patients son states GUSTAVO ARMENTA has a concern of a wound on patients bottom  11/19/2015: Arrived with Betadine, 4x4, Deborah intact to right heel  New wound to right posterior lower extremity Patient is unsure of when area opened   The patient will return this afternoon to be seen by Dr Sean Kline for a wound on his sacrum  11/19/2015: Patient was seen by Dr Sean Kline today  No open area to sacrum  Instructed to use a barrier cream such as NutraShield to the sacral area and keep pressure off area  Orders faxed to [facility]  12/2/15: The patient arrived with dressing intact to right foot wound; the right posterior/medial leg wound was AL - had a small soft dry flaky scab  Dr Dena White instructed the patient to do not elevate his legs to allow legs down and gravity to return blood flow  Dr Dena White discussed with the patient and his son a balloon procedure to open up any blockages in the right leg  Patient was given a script today for blood work  Patient was going to go to the lab today and have his blood work done  Dr Dena White office will call and set up an appointment for balloon procedure  1/7/2016: Arrived with Betadine and 4x4 to right heel and no dressing intact to right posterior lower extremity  1/21/2016: Arrived with Optifoam intact to right heel, no dressing intact to right medial posterior lower extremity  2/4/16: The patient arrived with dressing intact to RLE wounds  The patient states that he was confused and rolled out of bed this past Sunday - no injury sustained per patient, he did not go to ED  History of Falling: Yes = 25   Secondary Diagnosis: Yes = 15   Ambulatory Aid Crutches, Divina Bucker = 15   No = 0   Gait: Weak = 10 -- Short steps (may shuffle), stooped but able to lift head while walking, may seek support from furniture while walking, but with light touch (for reassurance)  Mental Status: Oriented to own ability = 0   Total Score: 65     > 45 = High Risk       The most recent fall occurred 1/31/16 and rolled out of bed  Number of falls in the last year were 2  The fall resulted from none  The fall was preceded by no known event  The fall resulted in none  A referral was made for none     Nutrition Assessment Screening: Food intake over the last 3 months due to the loss of appetite, digestive problems, chewing or swallowing difficulties is graded as: 2 = no decrease in food intake   Weight loss during the last 3 months: 3 = no weight loss   Mobility scored as: 1 = able to get out of chair or bed but does not go out  Psychological Stress and Acute Disease Scored as: 2 = The patient has not experienced psychological stress or acute disease in the last 3 months  Neuropsychological problems scored as: 2 = no psychological problems  Body Mass Index (BMI) scored as: 3 = BMI 23 or greater  Nutritional Assessment Screening Score: 12 - 14 points - Normal nutritional status  Provider Wound Care HPI: Patient presents with his son for follow up visit today  Patient states that he underwent a total right hip replacement and now has developed a unstageable ulceration on his right heel  Patient was recently discharged from [facility] [place] and lives with his Son at home  Patient was seen by Dr Lo Morales since last visit and awaiting renal testing prior to vascular intervention  Patient's son has been applying Santyl daily to the heel         Pain Assessment   the patient states they have pain  The pain is located in the right lower extremity  The pain radiates to the radiates to the ankle area  The patient describes the pain as burning  (on a scale of 0 to 10, the patient rates the pain at 5 )   Abuse And Domestic Violence Screen   Domestic violence screen not done today  Reason DV Screen not done: son present at visit    Depression And Suicide Screen  Suicide screen not done today  Reason suicide screen not done: son present at visit  Prefered Language is  [place]  Primary Language is  English  Readiness To Learn: Receptive  Barriers To Learning: hard of hearing     Preferred Learning: demonstration and written   Education Completed: further treatment/follow-up and treatment/procedure   Teaching Method: verbal   Person Taught: patient, family member  and son   Evaluation Of Learning: verbalized/demonstrated understanding and needs reinforcement      Review of Systems    Constitutional: feeling poorly  ENT: no complaints of earache, no loss of hearing, no nosebleeds or nasal discharge, no sore throat or hoarseness  Cardiovascular: no complaints of slow or fast heart rate, no chest pain, no palpitations, no leg claudication or lower extremity edema  Gastrointestinal: no complaints of abdominal pain, no constipation, no nausea or vomiting, no diarrhea or bloody stools  Genitourinary: no complaints of dysuria or incontinence, no hesitancy, no nocturia, no genital lesion, no inadequacy of penile erection  Musculoskeletal: joint swelling, limb pain and joint stiffness  Integumentary: skin wound and right heel unstageable ulceration  Active Problems    1  Aftercare following joint replacement (V54 81) (Z47 1)   2  Atrial fibrillation (427 31) (I48 91)   3  Chronic kidney disease, stage 3 (585 3) (N18 3)   4  Contracture, hip, right (718 45) (M24 551)   5  Decubitus ulcer of right heel, unstageable (707 07,707 25) (L89 610)   6  Esophageal reflux (530 81) (K21 9)   7  Hypertension (401 9) (I10)   8  Pain of right lower extremity (729 5) (M79 604)   9  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 (443 9,707 10)   (I73 9,L97 909)   10  Status post right hip replacement (V43 64) (Z96 641)   11  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    (250 70,443 81,785 4) (E11 52)    Past Medical History    1  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)   2  History of Gait abnormality (781 2) (R26 9)   3  History of kidney disease (V13 09) (Z87 448)   4  History of Urinary retention (788 20) (R33 9)    The active problems and past medical history were reviewed and updated today  Surgical History    1  History of Pacemaker Permanent Placement   2   History of Reported Hx Of Hip Replacement - Right Side    The surgical history was reviewed and updated today  Family History    1  Family history of malignant neoplasm (V16 9) (Z80 9)    The family history was reviewed and updated today  Social History    · Always uses seat belt   · Daily caffeine consumption, 1 serving a day   · Does not exercise (V69 0) (Z72 3)   · Former smoker (V15 82) (K59 596)   · No drug use   · Social alcohol use (F10 99)  The social history was reviewed and updated today  The social history was reviewed and is unchanged  Current Meds   1  Advair Diskus 250-50 MCG/DOSE Inhalation Aerosol Powder Breath Activated; INHALE 1   PUFF TWICE DAILY; Therapy: (Recorded:08Sep2015) to Recorded   2  Aspirin 81 MG Oral Tablet; TAKE 1 tablet Sundays and Wednesdays Recorded   3  Dulcolax 10 MG Rectal Suppository; USE AS DIRECTED; Therapy: (Recorded:08Sep2015) to Recorded   4  Flomax 0 4 MG Oral Capsule; TAKE ONE CAPSULE AT BEDTIME Recorded   5  Furosemide 40 MG Oral Tablet; TAKE 1 TABLET TWICE DAILY; Therapy: (Recorded:13Apr2015) to Recorded   6  Hydrocodone-Acetaminophen 5-325 MG Oral Tablet; TAKE 1 TABLET EVERY 6 HOURS   AS NEEDED FOR PAIN;   Therapy: (Recorded:08Sep2015) to Recorded   7  Lidocaine HCl (Local Anesth ) 4 % SOLN; Apply prn to wound(s) prior to debridement for   pain control; Therapy: (Recorded:19Nov2015) to Recorded   8  Nitroglycerin 0 4 MG SUBL; PLACE 1 TABLET UNDER THE TONGUE EVERY 5 MINUTES   FOR UP TO 3 DOSES AS NEEDED FOR CHEST PAIN  CALL 911 IF PAIN PERSISTS; Therapy: (Gerre Denver) to Recorded   9  NovoLOG Mix 70/30 (70-30) 100 UNIT/ML Subcutaneous Suspension; USE AS   DIRECTED; Therapy: (Recorded:13Apr2015) to Recorded   10  Pantoprazole Sodium 40 MG Oral Tablet Delayed Release; take one tablet by mouth    twice daily  Requested for: 15Apr2015; Last Rx:15Apr2015 Ordered   11   ProAir  (90 Base) MCG/ACT Inhalation Aerosol Solution; INHALE 2 PUFFS    EVERY 4-6 HOURS AS NEEDED; Therapy: (Recorded:79Kfy1004) to Recorded   12  Propranolol HCl - 40 MG Oral Tablet; Take 1 tablet twice daily  Requested for: 16Apr2015; Last Rx:16Apr2015 Ordered   13  Santyl 250 UNIT/GM External Ointment; APPLY TO AFFECTED AREA(S) ONCE DAILY AS    DIRECTED; Therapy: 54AQL6978 to (Abiola Welch)  Requested for: 41SXK4522; Last    Rx:07Jan2016 Ordered   15  Sotalol HCl (AF) 80 MG Oral Tablet; Take 1 tablet daily Recorded    The medication list was reviewed and updated today  Allergies    1  No Known Drug Allergies    Vitals  Vital Signs [Data Includes: Current Encounter]    Recorded: 88LYY9451 10:51AM   Temperature 96 2 F, Tympanic   Heart Rate 60   Respiration 18   Systolic 955   Diastolic 82   Height 5 ft 9 in   Weight Unobtainable Yes   Pain Scale 5     Physical Exam    Pulse Exam   Posterior tibialis: right 0 and left 0  Dorsalis pedis: right 0 and left 0  delayed 3 seconds  Lower Extremity Exam   Right Lower Extremity: Lower leg compartments are soft and without signs or symptoms for compartment syndrome  Left Lower Extremity: Lower leg compartments are soft and without signs or symptoms for compartment syndrome  Wound #1 Assessment wound #1 Location:, right heel, started on 8/2015, Care for this wound started on 10/22/2015  Wound Status: not healed  Pressure Ulcer Grade: Unstageable  Length: 1cm x Width: 3 5cm x Depth: 0 2cm   Total: 3 5sq cm   Wound Volume: 0 7cm3           Tissue type: Granulation and Slough   Color of Wound: Yellow - 95% and Pink - 5%   Exudate Amount: Moderate   Exudate Type: Serous   Odor: None   Exudate Color: Green   Wound Edges: Rolled (epibolized)   Periwound Skin Condition: Macerated, Scaly, Edema        Physician/Provider Wound #1 Exam   I agree with the nursing assessment and documentation  Wound #2 Assessment wound #2 Location:, right posterior lower extremity, started on unknown, Care for this wound started on 11/19/2015     Wound Status: not healed  Venous Ulcer: Full Thickness  Length: 0 3cm x Width: 0 3cm x Depth: 0 1cm   Total: 0 09sq cm   Wound Volume: 0 009cm3           Tissue type: Subcutaneous   Color of Wound: Red - 99% and Yellow - 1%   Exudate Amount: Scant   Exudate Type: Serous   Odor: None   Wound Edges: Intact   Periwound Skin Condition: Scaly, Edema, dry        Physician/Provider Wound #2 Exam   I agree with the nursing assessment and documentation  Geovanny Aldana 1: Wound Nursing Care Plan   Impaired Tissue Integrity related to: right heel   Goals   Patient will achieve 100% epithelialization:  Patient will demonstrate and verbalize knowledge of their disease process and management:  Wound Nursing Care Interventions:   Teach and evaluate effectiveness of offloading measures:  Teach patient and/or family about disease process and management methods:    Evaluate effectiveness of all above measures every 4 weeks with Patient Specific CQI:    Other:  plan of care initiated 10/22/2015, 12/2/15, reviewed 1/7/2016, reviewed 2/4/16      Procedure      Wound #1: right heel     Nurse Dressing Change:   Wound #1 The wound located on the right heel  Applied 4% topical Lidocaine solution to wound/ulcer prior to debridement for pain control  Wound care rendered as per Physician/Advanced Practitioner order/plan  Order sent to Home Care  Return to [address] [place] [address] 2 weeks  Comments:    Excisional Debridement Subcutaneous Tissue:   Wound was excisionally debrided to subcutaneous tissue as follows  Prior to the procedure, the patient was identified using two identifiers, the general consent was signed, the proper site of procedure was identified, and a time out was taken  Anesthesia: Local anesthesia with 4% topical lidocaine was utilized prior to the procedure for pain control     #15 surgical blade, a curette and forceps was utilized to surgically excise devitalized tissue and/or slough through epidermis, dermis and into the subcutaneous tissue  The total sq cm excised was 3 5sq cm  See wound assessment for further details  There was moderate bleeding controlled with gentle pressure  the patient tolerated the procedure well without complication  CPT Code(s)   [id number] - Excisional DebridementTo Subcutaneous Tissue; first 20 sq cm  Wound #2: right posterior lower extremity     Nurse Dressing Change:   Wound #2 The wound located on the right posterior lower extremity   Applied 4% topical Lidocaine solution to wound/ulcer prior to debridement for pain control  Wound care rendered as per Physician/Advanced Practitioner order/plan  Order sent to Home Care  Return to [address] [place] [address] 2 weeks  Comments:    Excisional Debridement Subcutaneous Tissue:   Wound was excisionally debrided to subcutaneous tissue as follows  Prior to the procedure, the patient was identified using two identifiers, the general consent was signed, the proper site of procedure was identified, and a time out was taken  Anesthesia: Local anesthesia with 4% topical lidocaine was utilized prior to the procedure for pain control  #15 surgical blade, a curette and forceps was utilized to surgically excise devitalized tissue and/or slough through epidermis, dermis and into the subcutaneous tissue  The total sq cm excised was 0 09sq cm  See wound assessment for further details  There was moderate bleeding controlled with gentle pressure  the patient tolerated the procedure well without complication  CPT Code(s)   F6099614 - Excisional DebridementTo Subcutaneous Tissue; first 20 sq cm  Provider Comments    Mediplex border to the leg wound only        Future Appointments    Date/Time Provider Specialty Site   06/30/2016 01:00 PM Cardiology, [address]   03/01/2016 02:00 PM Cardiology, Device Remote  [address]   02/18/2016 11:00 AM Helio Gage DPM [address]     Signatures   Electronically signed by : Yanet Cullen DPM; Feb 4 2016  4:51PM EST                       (Author)

## 2018-01-11 NOTE — MISCELLANEOUS
Message  Telephone call from [place] from the Vascular office  Notified Vladimir knight that the paper work for the upcoming Angio was given to the patients son  Confirmed phone number with son  Son reported that Tuesday is not going to work  Son was instructed to call the Vascular Office today to cancel and reschedule  Son was agreeable to same  Vladimir antonia reported she did not receive a call from the patients son  She is going to take the patient off the schedule for Tuesday and will reschedule when they call  Active Problems    1  Aftercare following joint replacement (V54 81) (Z47 1)   2  Atrial fibrillation (427 31) (I48 91)   3  Chronic kidney disease, stage 3 (585 3) (N18 3)   4  Chronic venous hypertension w ulceration, right (459 31) (I87 311)   5  Contracture, hip, right (718 45) (M24 551)   6  Decubitus ulcer of right heel, unstageable (707 07,707 25) (L89 610)   7  Esophageal reflux (530 81) (K21 9)   8  Hypertension (401 9) (I10)   9  Pain of right lower extremity (729 5) (M79 604)   10  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 (443 9,707 10)    (I73 9,L97 909)   11  Status post right hip replacement (V43 64) (Z96 641)   12  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    (250 70,443 81,785 4) (E11 52)   13  Venous insufficiency (chronic) (peripheral) (459 81) (I87 2)    Current Meds   1  Advair Diskus 250-50 MCG/DOSE Inhalation Aerosol Powder Breath Activated; INHALE   1 PUFF TWICE DAILY; Therapy: (Recorded:08Sep2015) to Recorded   2  Aspirin 81 MG Oral Tablet; TAKE 1 tablet Sundays and Wednesdays Recorded   3  Dulcolax 10 MG Rectal Suppository; USE AS DIRECTED; Therapy: (Recorded:08Sep2015) to Recorded   4  Flomax 0 4 MG Oral Capsule (Tamsulosin HCl); TAKE ONE CAPSULE AT BEDTIME   Recorded   5  Furosemide 40 MG Oral Tablet; TAKE 1 TABLET TWICE DAILY; Therapy: (Recorded:13Apr2015) to Recorded   6   Hydrocodone-Acetaminophen 5-325 MG Oral Tablet; TAKE 1 TABLET EVERY 6 HOURS   AS NEEDED FOR PAIN;   Therapy: (Recorded:52Vvr5991) to Recorded   7  Lidocaine HCl (Local Anesth ) 4 % SOLN; Apply prn to wound(s) prior to debridement for   pain control; Therapy: (Recorded:48Iei8139) to Recorded   8  Nitroglycerin 0 4 MG SUBL; PLACE 1 TABLET UNDER THE TONGUE EVERY 5   MINUTES FOR UP TO 3 DOSES AS NEEDED FOR CHEST PAIN  CALL 911 IF PAIN   PERSISTS; Therapy: (Dacia Koenig) to Recorded   9  NovoLOG Mix 70/30 (70-30) 100 UNIT/ML Subcutaneous Suspension; USE AS   DIRECTED; Therapy: (Recorded:02Bzr5445) to Recorded   10  Pantoprazole Sodium 40 MG Oral Tablet Delayed Release; take one tablet by mouth    twice daily  Requested for: 15Apr2015; Last Rx:15Apr2015 Ordered   11  ProAir  (90 Base) MCG/ACT Inhalation Aerosol Solution; INHALE 2 PUFFS    EVERY 4-6 HOURS AS NEEDED; Therapy: (Recorded:28Ykr2093) to Recorded   12  Propranolol HCl - 40 MG Oral Tablet; Take 1 tablet twice daily  Requested for:    16Apr2015; Last Rx:16Apr2015 Ordered   13  Santyl 250 UNIT/GM External Ointment; APPLY TO AFFECTED AREA(S) ONCE DAILY    AS DIRECTED; Therapy: 14GRY8757 to (Clark Douglas)  Requested for: 83TDO6326; Last    Rx:07Jan2016 Ordered   15  Sotalol HCl (AF) 80 MG Oral Tablet; Take 1 tablet daily Recorded    Allergies    1  No Known Drug Allergies    Signatures   Electronically signed by :  Rito Anthony RN; Feb 19 2016  8:04AM EST                       (Author)

## 2018-01-11 NOTE — MISCELLANEOUS
Message  called dr Catrachito Joaquin office to see who is managing coumadin,spoke to Tristin Srinivasan and she said patient is at [facility] and they are going to follow  Active Problems    1  Acute deep vein thrombosis (DVT) of femoral vein of right lower extremity (453 41)   (I82 411)   2  Aftercare following joint replacement (V54 81) (Z47 1)   3  Atrial fibrillation (427 31) (I48 91)   4  Chronic kidney disease, stage 3 (585 3) (N18 3)   5  Chronic venous hypertension w ulceration, right (459 31) (I87 311)   6  Contracture, hip, right (718 45) (M24 551)   7  Decubitus ulcer of right heel, unstageable (707 07,707 25) (L89 610)   8  DVT, recurrent, lower extremity, chronic (453 50) (I82 509)   9  Esophageal reflux (530 81) (K21 9)   10  Heel ulcer, right, with fat layer exposed (707 14) (L97 412)   11  Hypertension (401 9) (I10)   12  Pain of right lower extremity (729 5) (M79 604)   13  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 (443 9,707 10)    (I73 9,L97 909)   14  Right leg swelling (729 81) (M79 89)   15  Status post right hip replacement (V43 64) (Z96 641)   16  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    (250 70,443 81,785 4) (E11 52)   17  Venous insufficiency (chronic) (peripheral) (459 81) (I87 2)    Current Meds   1  Advair Diskus 250-50 MCG/DOSE Inhalation Aerosol Powder Breath Activated; INHALE   1 PUFF TWICE DAILY; Therapy: (Recorded:08Sep2015) to Recorded   2  Aspirin 81 MG TABS; TAKE 1 tablet Sundays and Wednesdays Recorded   3  Atrac-Tain 10 % External Cream;   Therapy: (Recorded:31Mar2016) to Recorded   4  Benefiber Oral Powder; Therapy: (Recorded:12May2016) to Recorded   5  Coumadin 2 MG Oral Tablet (Warfarin Sodium); Therapy: (Bradley Koroma) to Recorded   6  Dulcolax 10 MG Rectal Suppository; USE AS DIRECTED; Therapy: (Recorded:08Sep2015) to Recorded   7  Ferrous Sulfate 325 (65 Fe) MG Oral Tablet;    Therapy: (Recorded:26Apr2016) to Recorded   8  Furosemide 40 MG Oral Tablet; TAKE 1 TABLET TWICE DAILY; Therapy: (Recorded:77Pod9764) to Recorded   9  Hydrocodone-Acetaminophen 5-325 MG Oral Tablet; TAKE 1 TABLET EVERY 6 HOURS   AS NEEDED FOR PAIN;   Therapy: (Recorded:08Sep2015) to Recorded   10  Hydrocodone-Acetaminophen 5-325 MG Oral Tablet; Therapy: (Recorded:31Mar2016) to Recorded   11  Lidocaine HCl (Local Anesth ) 4 % SOLN; Apply prn to wound(s) prior to debridement for    pain control; Therapy: (Recorded:19Nov2015) to Recorded   12  Nitroglycerin 0 4 MG SUBL; PLACE 1 TABLET UNDER THE TONGUE EVERY 5    MINUTES FOR UP TO 3 DOSES AS NEEDED FOR CHEST PAIN  CALL 911 IF PAIN    PERSISTS; Therapy: (Zoran Pineda) to Recorded   13  NovoLOG Mix 70/30 (70-30) 100 UNIT/ML Subcutaneous Suspension; USE AS    DIRECTED; Therapy: (Recorded:13Apr2015) to Recorded   14  Pantoprazole Sodium 40 MG Oral Tablet Delayed Release; take one tablet by mouth    twice daily  Requested for: 15Apr2015; Last Rx:15Apr2015 Ordered   15  Polyethylene Glycol 3350 Oral Powder; Therapy: (Recorded:31Mar2016) to Recorded   16  ProAir  (90 Base) MCG/ACT Inhalation Aerosol Solution; INHALE 2 PUFFS    EVERY 4-6 HOURS AS NEEDED; Therapy: (Recorded:08Sep2015) to Recorded   17  Propranolol HCl - 40 MG Oral Tablet; Take 1 tablet twice daily  Requested for:    16Apr2015; Last Rx:16Apr2015 Ordered   18  Santyl 250 UNIT/GM External Ointment; Therapy: (Recorded:49Lap3762) to Recorded   19  Sotalol HCl (AF) 80 MG Oral Tablet; Take 1 tablet daily Recorded   20  Triad Hydrophilic Wound Dress External Paste; Therapy: (Recorded:31Mar2016) to Recorded   21  Tylenol 325 MG Oral Tablet; Therapy: (Recorded:31Mar2016) to Recorded    Allergies    1  No Known Drug Allergies    Signatures   Electronically signed by :  Azul Ron, ; May 16 2016  2:15PM EST                       (Author)

## 2018-01-11 NOTE — PROGRESS NOTES
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    · Follow-up visit in 2 weeks Evaluation and Treatment  Follow-up  Status: Hold For -  Scheduling  Requested for: 22Jan2016   Ordered; For: 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; Ordered By: Helio Gage Performed:  Due: 95TOS7590   · Collagenase (Santyl) instructions given; Status:Complete;   Done: 30CCU2712 03:12PM   Ordered; For: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; Ordered By:Cassie Thapa;   · Dermagran instructions given; Status:Complete;   Done: 61RWR2427 03:12PM   Ordered; For: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; Ordered By:Cassie Thapa;   · Wound care as instructed ; Status:Complete;   Done: 26WPW5030 03:12PM   Ordered; For: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; Ordered By:Paris Thapa; Wound Care Orders/Instructions    Wound Identification and Instructions   Wound #1: right heel  use: Normal Saline   Wound Care Instructions  Discussed with Patient/Caregiver  Dressing Type: Collagenase (Santyl)  Wash with mild soap and water, normal saline, wound cleanser or as specified  Apply 4% Topical Lidocaine anesthetic solution PRN to wound/ulcer prior to debridement for pain control  Apply specified dressing to wound base/bed     Secondary dressing apply: Gauze, 4x4  Secure with: Kerlix, and tape  Dressing change frequency: Daily  Offloading: Global pedi shoe  Comments/Other:   Santyl daily at home, hydrogel applied at [facility] today  Wound #2: right posterior lower extremity  use: Normal Saline   Wound Care Instructions  Discussed with Patient/Caregiver  Dressing Type: Estrella Been with mild soap and water, normal saline, wound cleanser or as specified  Apply 4% Topical Lidocaine anesthetic solution PRN to wound/ulcer prior to debridement for pain control  Apply specified dressing to wound base/bed  Secondary dressing apply: Meplex border  Dressing change frequency: Three times per week      Wound Goals  Wound Goals:   Healing Goals:   Fair healing potential, expect slow healing progress secondary to co-morbid conditions and advanced age  Wound will be free of infection   Patient will achieve appropriate pressure redistribution for wound prevention       Chief Complaint  Follow up for RLE wounds      History of Present Illness    Wound Identification HPI   Wound #1: right heel    Wound #2: right posterior lower extremity          The patient came to Wound Care via wheelchair, walker  The patient is being seen for a follow-up with MD at the [address] [place] [address]  The patient is accompanied by his son  The patient's identification was verified  A secondary verification process was completed  Orientation: oriented to person, oriented to place and oriented to time  Blood Glucose:  135 mg/dL as reported by patient  10/22/2015: Patient was referred by [facility]  Patient is currently at [facility], but will be going home on Sat  10/24/2015  8/20/2015 patient reports he was coming out of the bathroom and fell  Patient fractured Right hip  Patient was admitted to [facility]  Right hip repair done around 8/24/2015  11/5/15: Patient arrived with dressing intact to wound  Patient has appointment scheduled for Doppler's for tomorrow  Patients son states GUSTAVO ARMENTA has a concern of a wound on patients bottom   11/19/2015: Arrived with Betadine, 4x4, Deborah intact to right heel  New wound to right posterior lower extremity Patient is unsure of when area opened  The patient will return this afternoon to be seen by Dr Daniella Naidu for a wound on his sacrum  11/19/2015: Patient was seen by Dr Daniella Naidu today  No open area to sacrum  Instructed to use a barrier cream such as NutraShield to the sacral area and keep pressure off area  Orders faxed to [facility]  12/2/15: The patient arrived with dressing intact to right foot wound; the right posterior/medial leg wound was AL - had a small soft dry flaky scab  Dr Jeremy Valentine instructed the patient to do not elevate his legs to allow legs down and gravity to return blood flow  Dr Jeremy Valentine discussed with the patient and his son a balloon procedure to open up any blockages in the right leg  Patient was given a script today for blood work  Patient was going to go to the lab today and have his blood work done  Dr Jeremy Valentine office will call and set up an appointment for balloon procedure  1/7/2016: Arrived with Betadine and 4x4 to right heel and no dressing intact to right posterior lower extremity  1/21/2016: Arrived with Optifoam intact to right heel, no dressing intact to right medial posterior lower extremity  History of Falling: Yes = 25   Secondary Diagnosis: Yes = 15   Ambulatory Aid Crutches, Dai Axon = 15   No = 0   Gait: Weak = 10 -- Short steps (may shuffle), stooped but able to lift head while walking, may seek support from furniture while walking, but with light touch (for reassurance)  Mental Status: Oriented to own ability = 0   Total Score: 65     > 45 = High Risk       The most recent fall occurred 8/20/2015 and Denies any new falls since last visit     Nutrition Assessment Screening: Food intake over the last 3 months due to the loss of appetite, digestive problems, chewing or swallowing difficulties is graded as: 2 = no decrease in food intake   Weight loss during the last 3 months: 3 = no weight loss   Mobility scored as: 1 = able to get out of chair or bed but does not go out  Psychological Stress and Acute Disease Scored as: 2 = The patient has not experienced psychological stress or acute disease in the last 3 months  Neuropsychological problems scored as: 2 = no psychological problems  Body Mass Index (BMI) scored as: 3 = BMI 23 or greater  Nutritional Assessment Screening Score: 12 - 14 points - Normal nutritional status  Pain Assessment   the patient states they have pain  The pain is located in the right lower extremity  The pain radiates to the radiates to the ankle area  The patient describes the pain as burning  (on a scale of 0 to 10, the patient rates the pain at 4 )   Abuse And Domestic Violence Screen   Domestic violence screen not done today  Reason DV Screen not done: son present at visit    Depression And Suicide Screen  Suicide screen not done today  Reason suicide screen not done: son present at visit  Prefered Language is  [place]  Primary Language is  English  Readiness To Learn: Receptive  Barriers To Learning: hard of hearing  Preferred Learning: demonstration and written   Education Completed: further treatment/follow-up and treatment/procedure   Teaching Method: verbal   Person Taught: patient, family member  and son   Evaluation Of Learning: verbalized/demonstrated understanding and needs reinforcement         Provider Wound Care HPI: Patient presents with his son for follow up visit today  Patient states that he underwent a total right hip replacement and now has developed a unstageable ulceration on his right heel  Patient was recently discharged from [facility] and lives with his Son at home  Patient was seen by Dr Uzair Valero since last visit and awaiting renal testing prior to vascular intervention  Review of Systems    Constitutional: feeling poorly     ENT: no complaints of earache, no loss of hearing, no nosebleeds or nasal discharge, no sore throat or hoarseness  Cardiovascular: no complaints of slow or fast heart rate, no chest pain, no palpitations, no leg claudication or lower extremity edema  Gastrointestinal: no complaints of abdominal pain, no constipation, no nausea or vomiting, no diarrhea or bloody stools  Genitourinary: no complaints of dysuria or incontinence, no hesitancy, no nocturia, no genital lesion, no inadequacy of penile erection  Musculoskeletal: joint swelling, limb pain and joint stiffness  Integumentary: skin wound and right heel unstageable ulceration  ROS reviewed  Active Problems     1  Aftercare following joint replacement (V54 81) (Z47 1)   2  Atrial fibrillation (427 31) (I48 91)   3  Contracture, hip, right (718 45) (M24 551)   4  Esophageal reflux (530 81) (K21 9)   5  Pain of right lower extremity (729 5) (M79 604)   6  Status post right hip replacement (V43 64) (M80 906)    Hypertension (401 9) (I10)       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 (443 9) (I73 9)       Decubitus ulcer of right heel, unstageable (707 07) (L89 610)       Chronic kidney disease, stage 3 (585 3) (N18 3)       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 (250 70) (E11 52)          Past Medical History    1  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)   2  History of Gait abnormality (781 2) (R26 9)   3  History of kidney disease (V13 09) (Z87 448)   4  History of Urinary retention (788 20) (R33 9)    The active problems and past medical history were reviewed and updated today  Surgical History    1  History of Pacemaker Permanent Placement   2  History of Reported Hx Of Hip Replacement - Right Side    The surgical history was reviewed and updated today  Family History    1  Family history of malignant neoplasm (V16 9) (Z80 9)    The family history was reviewed and updated today         Social History    · Former smoker (B66 72) (H06 520)   · No drug use   · Social alcohol use (F10 99)  The social history was reviewed and updated today  The social history was reviewed and is unchanged  Current Meds   1  Advair Diskus 250-50 MCG/DOSE Inhalation Aerosol Powder Breath Activated; INHALE 1   PUFF TWICE DAILY; Therapy: (Recorded:08Sep2015) to Recorded   2  Aspirin 81 MG Oral Tablet; Therapy: (Recorded:13Apr2015) to Recorded   3  Dulcolax 10 MG Rectal Suppository; USE AS DIRECTED; Therapy: (Recorded:08Sep2015) to Recorded   4  Flomax 0 4 MG Oral Capsule; Therapy: [phone]) to Recorded   5  Furosemide 40 MG Oral Tablet; TAKE 1 TABLET TWICE DAILY; Therapy: (Recorded:13Apr2015) to Recorded   6  Hydrocodone-Acetaminophen 5-325 MG Oral Tablet; TAKE 1 TABLET EVERY 6 HOURS   AS NEEDED FOR PAIN;   Therapy: (Recorded:08Sep2015) to Recorded   7  Lidocaine HCl (Local Anesth ) 4 % SOLN; Apply prn to wound(s) prior to debridement for   pain control; Therapy: (Recorded:19Nov2015) to Recorded   8  Nitroglycerin 0 4 MG SUBL; Therapy: (Recorded:13Apr2015) to Recorded   9  NovoLOG Mix 70/30 (70-30) 100 UNIT/ML Subcutaneous Suspension; USE AS   DIRECTED; Therapy: (Recorded:13Apr2015) to Recorded   10  Pantoprazole Sodium 40 MG Oral Tablet Delayed Release; take one tablet by mouth    twice daily  Requested for: 15Apr2015; Last Rx:15Apr2015 Ordered   11  ProAir  (90 Base) MCG/ACT Inhalation Aerosol Solution; INHALE 2 PUFFS    EVERY 4-6 HOURS AS NEEDED; Therapy: (Recorded:08Sep2015) to Recorded   12  Propranolol HCl - 40 MG Oral Tablet; Take 1 tablet twice daily  Requested for: 16Apr2015; Last Rx:16Apr2015 Ordered   13  Santyl 250 UNIT/GM External Ointment; APPLY TO AFFECTED AREA(S) ONCE DAILY AS    DIRECTED; Therapy: 10BJD6737 to (Barron Wadsworth)  Requested for: 36SKV1993; Last    Rx:07Jan2016 Ordered   15  Sorbitol 70 % Oral Solution; Therapy: [phone]) to Recorded   15  Sotalol HCl (AF) 80 MG Oral Tablet;     Therapy: (Recorded:22Oct2015) to Recorded    The medication list was reviewed and updated today  Allergies    1  No Known Drug Allergies    Vitals  Vital Signs [Data Includes: Current Encounter]    Recorded: 21Jan2016 10:26AM   Temperature 96 7 F, Tympanic   Heart Rate 60, R Radial   Pulse Quality Regular, R Radial   Respiration 16   Respiration Quality Normal   Systolic 963, RUE, Sitting   Diastolic 64, RUE, Sitting   Height 5 ft 9 in   Pain Scale 4     Physical Exam    Pulse Exam   Posterior tibialis: right 0 and left 0  Dorsalis pedis: right 0 and left 0  delayed 3 seconds  Lower Extremity Exam   Right Lower Extremity: Lower leg compartments are soft and without signs or symptoms for compartment syndrome  Left Lower Extremity: Lower leg compartments are soft and without signs or symptoms for compartment syndrome  Wound #1 Assessment wound #1 Location:, right heel, started on 8/2015, Care for this wound started on 10/22/2015  Wound Status: not healed  Pressure Ulcer Grade: Unstageable  Length: 1 4cm x Width: 3 6cm x Depth: 0 3cm   Total: 5 04sq cm   Wound Volume: 1 512cm3           Tissue type: Granulation and Slough   Color of Wound: Red - 20% and Yellow - 80%   Exudate Amount: Minimal   Exudate Type: Serosangiunous   Odor: None   Exudate Color: Yellow and Green   Wound Edges: Detached   Periwound Skin Condition: Scaly, Edema        Physician/Provider Wound #1 Exam   I agree with the nursing assessment and documentation  Wound #2 Assessment wound #2 Location:, right posterior lower extremity, started on unknown, Care for this wound started on 11/19/2015  Wound Status: not healed  Venous Ulcer: Full Thickness     Length: 0 4cm x Width: 0 5cm x Depth: 0 1cm   Total: 0 2sq cm   Wound Volume: 0 02cm3           Tissue type: Granulation and Slough   Color of Wound: Red - 99% and Yellow - 1%   Exudate Amount: None   Odor: None   Wound Edges: Intact   Periwound Skin Condition: Scaly, Edema, dry        Physician/Provider Wound #2 Exam   I agree with the nursing assessment and documentation  Geovanny Aldana 1: Wound Nursing Care Plan   Impaired Tissue Integrity related to: right heel   Goals   Patient will achieve 100% epithelialization:  Patient will demonstrate and verbalize knowledge of their disease process and management:  Wound Nursing Care Interventions:   Teach and evaluate effectiveness of offloading measures:  Teach patient and/or family about disease process and management methods:    Evaluate effectiveness of all above measures every 4 weeks with Patient Specific CQI:    Other:  plan of care initiated 10/22/2015, 12/2/15, reviewed 1/7/2016      Procedure      Wound #1: right heel     Nurse Dressing Change:   Wound #1 The wound located on the right heel  Applied 4% topical Lidocaine solution to wound/ulcer prior to debridement for pain control  Wound care rendered as per Physician/Advanced Practitioner order/plan  Order sent to Home Care  Return to [address] [place] [address] 2 weeks  Comments:, Patient to follow up in two weeks with Dr Ifrah Bauer for right heel and with Dr Antunez for buttock wound  Excisional Debridement Subcutaneous Tissue:   Wound was excisionally debrided to subcutaneous tissue as follows  Prior to the procedure, the patient was identified using two identifiers, the general consent was signed, the proper site of procedure was identified, and a time out was taken  Anesthesia: Local anesthesia with 4% topical lidocaine was utilized prior to the procedure for pain control  #15 surgical blade, a curette and forceps was utilized to surgically excise devitalized tissue and/or slough through epidermis, dermis and into the subcutaneous tissue  The total sq cm excised was 5 04sq cm  See wound assessment for further details  There was moderate bleeding controlled with gentle pressure   the patient tolerated the procedure well without complication  CPT Code(s)   [id number] - Excisional DebridementTo Subcutaneous Tissue; first 20 sq cm  Wound #2: right posterior lower extremity     Nurse Dressing Change:   Wound #2 The wound located on the right posterior lower extremity  Applied 4% topical Lidocaine solution to wound/ulcer prior to debridement for pain control  Wound care rendered as per Physician/Advanced Practitioner order/plan  Order sent to Home Care  Return to [facility] 2 weeks  Comments:, Applied skin prep to right 3rd toe today, to help prevent a wound from opening  Removal Devitalized Tissue: Prior to the procedure, the patient was identified using two identifiers, the general consent was signed, the proper site of procedure was identified, and a time out was taken  Due to the presence of senescent cells and/or biofilm in the wound, a medical decision was made to perform a selective debridement  Anesthesia: Local anesthesia with 4% topical lidocaine was utilized prior to the procedure for pain control  #15 surgical blade and a curette was utilized to debride non-viable tissue  The non-viable tissue was removed  There was moderate bleeding controlled with gentle pressure  The total surface area that was selectively debrided for the above site was 0 2sq cm  The patient tolerated the procedure well without complication  Codes:   66526 Removal Of Devitalized Tissue First 20 sq cm or less            Future Appointments    Date/Time Provider Specialty Site   06/30/2016 01:00 PM Cardiology, [address] [facility]   03/01/2016 02:00 PM Cardiology, Device Remote  [address]   02/04/2016 10:30 AM Milton Ward DPM Wound Care [place] [address]     Signatures   Electronically signed by : Anita Coronado DPM; Jan 22 2016  3:13PM EST                       (Author)

## 2018-01-12 VITALS
TEMPERATURE: 96.9 F | SYSTOLIC BLOOD PRESSURE: 142 MMHG | HEART RATE: 60 BPM | DIASTOLIC BLOOD PRESSURE: 84 MMHG | RESPIRATION RATE: 16 BRPM | HEIGHT: 69 IN

## 2018-01-12 VITALS
RESPIRATION RATE: 18 BRPM | DIASTOLIC BLOOD PRESSURE: 74 MMHG | HEIGHT: 69 IN | HEART RATE: 60 BPM | TEMPERATURE: 96.8 F | SYSTOLIC BLOOD PRESSURE: 138 MMHG

## 2018-01-12 NOTE — MISCELLANEOUS
Physical Exam    Pulse Exam   Posterior tibialis: right 0 and left 0  Dorsalis pedis: right 0 and left 0  delayed 3 seconds  Lower Extremity Exam   Right Lower Extremity: Lower leg compartments are soft and without signs or symptoms for compartment syndrome  Left Lower Extremity: Lower leg compartments are soft and without signs or symptoms for compartment syndrome  Wound #1 Assessment wound #1 Location:, right heel, started on 8/2015, Care for this wound started on 10/22/2015  Wound Status: not healed  Pressure Ulcer Grade: Unstageable  Length: 1cm x Width: 3 5cm x Depth: 0 2cm   Total: 3 5sq cm   Wound Volume: 0 7cm3           Tissue type: Granulation and Slough   Color of Wound: Yellow - 95% and Pink - 5%   Exudate Amount: Moderate   Exudate Type: Serous   Odor: None   Exudate Color: Green   Wound Edges: Rolled (epibolized)   Periwound Skin Condition: Macerated, Scaly, Edema        Physician/Provider Wound #1 Exam   I agree with the nursing assessment and documentation  Wound #2 Assessment wound #2 Location:, right posterior lower extremity, started on unknown, Care for this wound started on 11/19/2015  Wound Status: not healed  Venous Ulcer: Full Thickness  Length: 0 3cm x Width: 0 3cm x Depth: 0 1cm   Total: 0 09sq cm   Wound Volume: 0 009cm3           Tissue type: Subcutaneous   Color of Wound: Red - 99% and Yellow - 1%   Exudate Amount: Scant   Exudate Type: Serous   Odor: None   Wound Edges: Intact   Periwound Skin Condition: Scaly, Edema, dry        Physician/Provider Wound #2 Exam   I agree with the nursing assessment and documentation  Wound Drsg  Orders/Instructions  Wound Identification Dressing Orders--Instructions:   Wound Identification and Instructions   Wound #1: right heel  use: Normal Saline   Wound Care Instructions  Discussed with Patient/Caregiver  Dressing Type: Hydrogel  Wash with mild soap and water, normal saline, wound cleanser or as specified    Apply 4% Topical Lidocaine anesthetic solution PRN to wound/ulcer prior to debridement for pain control  Apply specified dressing to wound base/bed  To periwound apply: Vaseline  Secondary dressing apply: Gauze, 4x4  Secure with: Kerlix, and tape  Dressing change frequency: Daily  Offloading: Global pedi shoe  Comments/Other:   Santyl daily to wound and Vaseline to ashley wound at home, hydrogel applied [facility] today  Wound #2: right posterior lower extremity  use: Normal Saline   Wound Care Instructions  Discussed with Patient/Caregiver  Wash with mild soap and water, normal saline, wound cleanser or as specified  Apply 4% Topical Lidocaine anesthetic solution PRN to wound/ulcer prior to debridement for pain control  Apply specified dressing to wound base/bed  Secondary dressing apply: Meplex border  Dressing change frequency: Twice per week      Wound Goals  Wound Goals:   Healing Goals:   Fair healing potential, expect slow healing progress secondary to co-morbid conditions and advanced age  Wound will be free of infection   Patient will achieve appropriate pressure redistribution for wound prevention       Future Appointments    Date/Time Provider Specialty Site   06/30/2016 01:00 PM Cardiology, 2021 Adrianne Gaspar   03/01/2016 02:00 PM Cardiology, Device Remote  [address]   02/18/2016 11:00 AM Marcell Valladares 1: Wound Nursing Care Plan   Impaired Tissue Integrity related to: right heel   Goals   Patient will achieve 100% epithelialization:  Patient will demonstrate and verbalize knowledge of their disease process and management:  Wound Nursing Care Interventions:   Teach and evaluate effectiveness of offloading measures:     Teach patient and/or family about disease process and management methods:    Evaluate effectiveness of all above measures every 4 weeks with Patient Specific CQI:    Other:  plan of care initiated 10/22/2015, 12/2/15, reviewed 1/7/2016, reviewed 2/4/16      Signatures   Electronically signed by : Madina Abraham DPM; Feb 4 2016  4:51PM EST                       (Author)

## 2018-01-12 NOTE — MISCELLANEOUS
Physical Exam    Pulse Exam   Posterior tibialis: right 0 and left 0  Dorsalis pedis: right 0 and left 0  delayed 3 seconds  Lower Extremity Exam   Right Lower Extremity: Lower leg compartments are soft and without signs or symptoms for compartment syndrome  Left Lower Extremity: Lower leg compartments are soft and without signs or symptoms for compartment syndrome  Wound #1 Assessment wound #1 Location:, right heel, started on 8/2015, Care for this wound started on 10/22/2015  Wound Status: not healed  Pressure Ulcer Grade: Unstageable  Length: 1 4cm x Width: 3 6cm x Depth: 0 3cm   Total: 5 04sq cm   Wound Volume: 1 512cm3           Tissue type: Granulation and Slough   Color of Wound: Red - 20% and Yellow - 80%   Exudate Amount: Minimal   Exudate Type: Serosangiunous   Odor: None   Exudate Color: Yellow and Green   Wound Edges: Detached   Periwound Skin Condition: Scaly, Edema        Physician/Provider Wound #1 Exam   I agree with the nursing assessment and documentation  Wound #2 Assessment wound #2 Location:, right posterior lower extremity, started on unknown, Care for this wound started on 11/19/2015  Wound Status: not healed  Venous Ulcer: Full Thickness  Length: 0 4cm x Width: 0 5cm x Depth: 0 1cm   Total: 0 2sq cm   Wound Volume: 0 02cm3           Tissue type: Granulation and Slough   Color of Wound: Red - 99% and Yellow - 1%   Exudate Amount: None   Odor: None   Wound Edges: Intact   Periwound Skin Condition: Scaly, Edema, dry        Physician/Provider Wound #2 Exam   I agree with the nursing assessment and documentation  Wound Drsg  Orders/Instructions  Wound Identification Dressing Orders--Instructions:   Wound Identification and Instructions   Wound #1: right heel  use: Normal Saline   Wound Care Instructions  Discussed with Patient/Caregiver  Dressing Type: Collagenase (Santyl)  Wash with mild soap and water, normal saline, wound cleanser or as specified    Apply 4% Topical Lidocaine anesthetic solution PRN to wound/ulcer prior to debridement for pain control  Apply specified dressing to wound base/bed  Secondary dressing apply: Gauze, 4x4  Secure with: Kerlix, and tape  Dressing change frequency: Daily  Offloading: Global pedi shoe  Comments/Other:   Santyl daily at home, hydrogel applied at [facility] today  Wound #2: right posterior lower extremity  use: Normal Saline   Wound Care Instructions  Discussed with Patient/Caregiver  Dressing Type: Prosper Buffy with mild soap and water, normal saline, wound cleanser or as specified  Apply 4% Topical Lidocaine anesthetic solution PRN to wound/ulcer prior to debridement for pain control  Apply specified dressing to wound base/bed  Secondary dressing apply: Meplex border  Dressing change frequency: Three times per week      Wound Goals  Wound Goals:   Healing Goals:   Fair healing potential, expect slow healing progress secondary to co-morbid conditions and advanced age  Wound will be free of infection   Patient will achieve appropriate pressure redistribution for wound prevention       Future Appointments    Date/Time Provider Specialty Site   06/30/2016 01:00 PM Cardiology, 2021 Adrianne Gaspar   03/01/2016 02:00 PM Cardiology, Device Remote  [address]   02/04/2016 10:30 AM Marcell Robb 1: Wound Nursing Care Plan   Impaired Tissue Integrity related to: right heel   Goals   Patient will achieve 100% epithelialization:  Patient will demonstrate and verbalize knowledge of their disease process and management:  Wound Nursing Care Interventions:   Teach and evaluate effectiveness of offloading measures:     Teach patient and/or family about disease process and management methods:    Evaluate effectiveness of all above measures every 4 weeks with Patient Specific CQI:    Other:  plan of care initiated 10/22/2015, 12/2/15, reviewed 1/7/2016      Signatures   Electronically signed by : Janice Vazquez DPM; Jan 22 2016  3:13PM EST                       (Author)

## 2018-01-12 NOTE — MISCELLANEOUS
Message  call from [facility]  she last saw pt 3/4/16  since agram last week RLE has increased swelling (she states it is chronically edemedous but has incrased) andweeping  the son did not change any of his dressings to foot/toe and they were saturated from weeping  now the skin is macerated and the wounds look worse  she called wound care and they instructed to apply algenate and notify Dr Sonny Esposito notifed of same  he states increased swelling could be reperfusion but he would like to obtain lev doppler RLE to r/o dvt and also arterial of RLE to assess stent/angioplasty sites  [place] informed of same, transf to [facility]  son currently at work, they are going to work on arranging dopplers for pt  Leesa Jack s/w pt's son and pt is having dopplers done this evening at [facility]  1        1 Amended By: Batsheva Keys; Mar 08 2016 3:39 PM EST    Active Problems   1  Aftercare following joint replacement (V54 81) (Z47 1)  2  Atrial fibrillation (427 31) (I48 91)  3  Chronic kidney disease, stage 3 (585 3) (N18 3)  4  Chronic venous hypertension w ulceration, right (459 31) (I87 311)  5  Contracture, hip, right (718 45) (M24 551)  6  Decubitus ulcer of right heel, unstageable (707 07,707 25) (L89 610)  7  Esophageal reflux (530 81) (K21 9)  8  Hypertension (401 9) (I10)  9  Pain of right lower extremity (729 5) (M79 604)  10  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 (443 9,707 10)    (I73 9,L97 909)  11  Right leg swelling (729 81) (M79 89)  12  Status post right hip replacement (V43 64) (Z96 641)  13  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    (250 70,443 81,785 4) (E11 52)  14  Venous insufficiency (chronic) (peripheral) (459 81) (I87 2)    Current Meds  1  Advair Diskus 250-50 MCG/DOSE Inhalation Aerosol Powder Breath Activated; INHALE   1 PUFF TWICE DAILY; Therapy: (Recorded:11Lrd5781) to Recorded  2   Aspirin 81 MG Oral Tablet; TAKE 1 tablet Sundays and Wednesdays Recorded  3  Dulcolax 10 MG Rectal Suppository; USE AS DIRECTED; Therapy: (Recorded:08Sep2015) to Recorded  4  Flomax 0 4 MG Oral Capsule (Tamsulosin HCl); TAKE ONE CAPSULE AT BEDTIME   Recorded  5  Furosemide 40 MG Oral Tablet; TAKE 1 TABLET TWICE DAILY; Therapy: (Recorded:13Apr2015) to Recorded  6  Hydrocodone-Acetaminophen 5-325 MG Oral Tablet; TAKE 1 TABLET EVERY 6 HOURS   AS NEEDED FOR PAIN;   Therapy: (Recorded:08Sep2015) to Recorded  7  Lidocaine HCl (Local Anesth ) 4 % SOLN; Apply prn to wound(s) prior to debridement for   pain control; Therapy: (Recorded:19Nov2015) to Recorded  8  Nitroglycerin 0 4 MG SUBL; PLACE 1 TABLET UNDER THE TONGUE EVERY 5   MINUTES FOR UP TO 3 DOSES AS NEEDED FOR CHEST PAIN  CALL 911 IF PAIN   PERSISTS; Therapy: (Tanika Meadows) to Recorded  9  NovoLOG Mix 70/30 (70-30) 100 UNIT/ML Subcutaneous Suspension; USE AS   DIRECTED; Therapy: (Recorded:13Apr2015) to Recorded  10  Pantoprazole Sodium 40 MG Oral Tablet Delayed Release; take one tablet by mouth    twice daily  Requested for: 15Apr2015; Last Rx:15Apr2015 Ordered  11  ProAir  (90 Base) MCG/ACT Inhalation Aerosol Solution; INHALE 2 PUFFS    EVERY 4-6 HOURS AS NEEDED; Therapy: (Recorded:08Sep2015) to Recorded  12  Propranolol HCl - 40 MG Oral Tablet; Take 1 tablet twice daily  Requested for:    94Cnw0696; Last Rx:16Apr2015 Ordered  13  Santyl 250 UNIT/GM External Ointment; APPLY TO AFFECTED AREA(S) ONCE DAILY    AS DIRECTED; Therapy: 17SKE2552 to (Dhruv Winchester)  Requested for: 95KVE3374; Last    Rx:07Jan2016 Ordered  15  Sotalol HCl (AF) 80 MG Oral Tablet; Take 1 tablet daily Recorded    Allergies   1   No Known Drug Allergies    Signatures   Electronically signed by : Caleb Gonzalez, ; Mar  8 2016  3:39PM EST                       (Author)

## 2018-01-14 VITALS
RESPIRATION RATE: 16 BRPM | TEMPERATURE: 96.8 F | OXYGEN SATURATION: 97 % | DIASTOLIC BLOOD PRESSURE: 60 MMHG | SYSTOLIC BLOOD PRESSURE: 90 MMHG | HEART RATE: 70 BPM

## 2018-01-15 NOTE — MISCELLANEOUS
Message  Telephone call from 1 [facility] stating that patient had A-gram and is now experiencing blisters and copious weeping from RLE  Gia Benjamin states skin on right foot is sloughing and wounds are very macerated  Further states that the patient's son failed to change the dressings since her last visit to patient on 3/4/16 - patient is unable to give Gia Benjamin a rationale as to why, Gia Benjamin is going to contact the patients son  Gia Benjamin is asking for direction for wound care secondary to copious drainage  [facility] RN telephone call to Dr Tanya Klein, verbal order obtained for calcium alginate to all open weeping areas, change daily  Also, contact [facility] to make them aware of the blisters and drainage post a-gram  RN t/c to [address] [place] [address]; Gia Benjamin verbalized understanding  She left a message for [facility] and is awaiting their return call  Active Problems    1  Aftercare following joint replacement (V54 81) (Z47 1)   2  Atrial fibrillation (427 31) (I48 91)   3  Chronic kidney disease, stage 3 (585 3) (N18 3)   4  Chronic venous hypertension w ulceration, right (459 31) (I87 311)   5  Contracture, hip, right (718 45) (M24 551)   6  Decubitus ulcer of right heel, unstageable (707 07,707 25) (L89 610)   7  Esophageal reflux (530 81) (K21 9)   8  Hypertension (401 9) (I10)   9  Pain of right lower extremity (729 5) (M79 604)   10  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 (443 9,707 10)    (I73 9,L97 909)   11  Status post right hip replacement (V43 64) (Z96 641)   12  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    (250 70,443 81,785 4) (E11 52)   13  Venous insufficiency (chronic) (peripheral) (459 81) (I87 2)    Current Meds   1  Advair Diskus 250-50 MCG/DOSE Inhalation Aerosol Powder Breath Activated; INHALE   1 PUFF TWICE DAILY; Therapy: (Recorded:08Ekx9058) to Recorded   2  Aspirin 81 MG Oral Tablet; TAKE 1 tablet Sundays and Wednesdays Recorded   3  Dulcolax 10 MG Rectal Suppository; USE AS DIRECTED; Therapy: (Recorded:08Sep2015) to Recorded   4  Flomax 0 4 MG Oral Capsule (Tamsulosin HCl); TAKE ONE CAPSULE AT BEDTIME   Recorded   5  Furosemide 40 MG Oral Tablet; TAKE 1 TABLET TWICE DAILY; Therapy: (Recorded:13Apr2015) to Recorded   6  Hydrocodone-Acetaminophen 5-325 MG Oral Tablet; TAKE 1 TABLET EVERY 6 HOURS   AS NEEDED FOR PAIN;   Therapy: (Recorded:08Sep2015) to Recorded   7  Lidocaine HCl (Local Anesth ) 4 % SOLN; Apply prn to wound(s) prior to debridement for   pain control; Therapy: (Recorded:19Nov2015) to Recorded   8  Nitroglycerin 0 4 MG SUBL; PLACE 1 TABLET UNDER THE TONGUE EVERY 5   MINUTES FOR UP TO 3 DOSES AS NEEDED FOR CHEST PAIN  CALL 911 IF PAIN   PERSISTS; Therapy: (Phyllis Pavon) to Recorded   9  NovoLOG Mix 70/30 (70-30) 100 UNIT/ML Subcutaneous Suspension; USE AS   DIRECTED; Therapy: (Recorded:13Apr2015) to Recorded   10  Pantoprazole Sodium 40 MG Oral Tablet Delayed Release; take one tablet by mouth    twice daily  Requested for: 15Apr2015; Last Rx:15Apr2015 Ordered   11  ProAir  (90 Base) MCG/ACT Inhalation Aerosol Solution; INHALE 2 PUFFS    EVERY 4-6 HOURS AS NEEDED; Therapy: (Recorded:08Sep2015) to Recorded   12  Propranolol HCl - 40 MG Oral Tablet; Take 1 tablet twice daily  Requested for:    16Apr2015; Last Rx:16Apr2015 Ordered   13  Santyl 250 UNIT/GM External Ointment; APPLY TO AFFECTED AREA(S) ONCE DAILY    AS DIRECTED; Therapy: 46AVA5547 to (Vazquez Jones)  Requested for: 98UXZ8763; Last    Rx:07Jan2016 Ordered   15  Sotalol HCl (AF) 80 MG Oral Tablet; Take 1 tablet daily Recorded    Allergies    1   No Known Drug Allergies    Signatures   Electronically signed by : Hunter Stinson RN; Mar  8 2016 12:20PM EST                       (Author)

## 2018-01-16 NOTE — MISCELLANEOUS
Message   Recorded as Task   Date: 03/23/2016 05:11 PM, Created By: Caren Galaviz   Task Name: Miscellaneous   Assigned To: Ronen Pleitez   Regarding Patient: Franci Medel, Status: In Progress   Comment:    Jose Angel Jaimes - 23 Mar 2016 5:11 PM     TASK CREATED  Labs daryl, recently discharged from [address] [place] [address] after acute right leg blood clot  renal function stable after a-gram   pleaese call patient and arrnage a follow up in 6 months with renal function panel and PTH  Esthela Watson - 24 Mar 2016 10:29 AM     TASK IN PROGRESS   VladimirRosemary - 24 Mar 2016 10:30 AM     TASK EDITED  l/m for patient to return call   Dariela Gilbertee - 30 Mar 2016 4:42 PM     TASK EDITED  l/m for patient to return call       Patient never returned phone call  Lab slips for 6 months mailed to the patient  AG      Active Problems    1  Aftercare following joint replacement (V54 81) (Z47 1)   2  Atrial fibrillation (427 31) (I48 91)   3  Chronic kidney disease, stage 3 (585 3) (N18 3)   4  Chronic venous hypertension w ulceration, right (459 31) (I87 311)   5  Contracture, hip, right (718 45) (M24 551)   6  Decubitus ulcer of right heel, unstageable (707 07,707 25) (L89 610)   7  Esophageal reflux (530 81) (K21 9)   8  Hypertension (401 9) (I10)   9  Pain of right lower extremity (729 5) (M79 604)   10  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 (443 9,707 10)    (I73 9,L97 909)   11  Right leg swelling (729 81) (M79 89)   12  Status post right hip replacement (V43 64) (Z96 641)   13  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    (250 70,443 81,785 4) (E11 52)   14  Venous insufficiency (chronic) (peripheral) (459 81) (I87 2)    Current Meds   1  Advair Diskus 250-50 MCG/DOSE Inhalation Aerosol Powder Breath Activated; INHALE   1 PUFF TWICE DAILY; Therapy: (Recorded:59Xui1330) to Recorded   2   Aspirin 81 MG Oral Tablet; TAKE 1 tablet Sundays and Wednesdays Recorded   3  Atrac-Tain 10 % External Cream;   Therapy: (Recorded:31Mar2016) to Recorded   4  Dulcolax 10 MG Rectal Suppository; USE AS DIRECTED; Therapy: (Recorded:08Sep2015) to Recorded   5  Furosemide 40 MG Oral Tablet; TAKE 1 TABLET TWICE DAILY; Therapy: (Recorded:13Apr2015) to Recorded   6  Hydrocodone-Acetaminophen 5-325 MG Oral Tablet; TAKE 1 TABLET EVERY 6 HOURS   AS NEEDED FOR PAIN;   Therapy: (Recorded:08Sep2015) to Recorded   7  Hydrocodone-Acetaminophen 5-325 MG Oral Tablet; Therapy: (Recorded:31Mar2016) to Recorded   8  Lidocaine HCl (Local Anesth ) 4 % SOLN; Apply prn to wound(s) prior to debridement for   pain control; Therapy: (Recorded:19Nov2015) to Recorded   9  Nitroglycerin 0 4 MG SUBL; PLACE 1 TABLET UNDER THE TONGUE EVERY 5   MINUTES FOR UP TO 3 DOSES AS NEEDED FOR CHEST PAIN  CALL 911 IF PAIN   PERSISTS; Therapy: (Fracisco Stovall) to Recorded   10  NovoLOG Mix 70/30 (70-30) 100 UNIT/ML Subcutaneous Suspension; USE AS    DIRECTED; Therapy: (Recorded:13Apr2015) to Recorded   11  Pantoprazole Sodium 40 MG Oral Tablet Delayed Release; take one tablet by mouth    twice daily  Requested for: 15Apr2015; Last Rx:15Apr2015 Ordered   12  Polyethylene Glycol 3350 Oral Powder; Therapy: (Recorded:31Mar2016) to Recorded   13  ProAir  (90 Base) MCG/ACT Inhalation Aerosol Solution; INHALE 2 PUFFS    EVERY 4-6 HOURS AS NEEDED; Therapy: (Recorded:08Sep2015) to Recorded   14  Propranolol HCl - 40 MG Oral Tablet; Take 1 tablet twice daily  Requested for:    16Apr2015; Last Rx:16Apr2015 Ordered   15  Sotalol HCl (AF) 80 MG Oral Tablet; Take 1 tablet daily Recorded   16  Triad Hydrophilic Wound Dress External Paste; Therapy: (Recorded:31Mar2016) to Recorded   17  Tylenol 325 MG Oral Tablet; Therapy: (Recorded:31Mar2016) to Recorded   18  Warfarin Sodium 3 MG Oral Tablet; Therapy: (Recorded:31Mar2016) to Recorded    Allergies    1   No Known Drug Allergies    Plan  Hypertension · (1) PTH N-TERMINAL (INTACT); Status:Active - Retrospective By Protocol Authorization; Requested for:98Fss1736;    · (1) RENAL FUNCTION PANEL; Status:Active - Retrospective By Protocol Authorization;   Requested for:83Mkp6546;     Signatures   Electronically signed by : MYNOR Page ; Apr 5 2016  9:09AM EST

## 2018-03-07 NOTE — PROGRESS NOTES
"  Discussion/Summary  Normal device function     PAF  on sotalol coumadin  Results/Data  Cardiac Device Remote [id number] 06:50PM Bob Golds     Test Name Result Flag Reference   MISCELLANEOUS COMMENT      CARELINK TRANSMISSION: BATTERY STATUS "OK"  AP 87 1%  0 3%  ALL AVAILABLE LEAD PARAMETERS WITHIN NORMAL LIMITS  5 AHRS NOTED, LONGEST 50 MINS  PT ON COUMADIN, ASA, & SOTALOL  AVAIL EGRAMS PRESENT AS AFIB  NORMAL DEVICE FUNCTION  NC   Cardiac Electrophysiology Report      [id number]  pdf   DEVICE TYPE Pacemaker       Cardiac Electrophysiology Report [id number] 06:50PM Bob Golds     Test Name Result Flag Reference   Cardiac Electrophysiology Report      [id number]  pdf     Signatures   Electronically signed by : June Pike, ; May 16 2017  1:40PM EST                       (Author)    Electronically signed by : MYNOR Leslie ; May 16 2017  5:54PM EST                       (Author)    "

## 2018-03-07 NOTE — PROGRESS NOTES
"  Discussion/Summary  Normal device function     A fib  on sotalol , propranolol,   Anticoagulation by ASA  considering CHADS, inadequate    However Cirrhosis and CKD limits drug choices       Results/Data  Cardiac Device In Clinic [id number] 02:10PM Sari Leyden     Test Name Result Flag Reference   MISCELLANEOUS COMMENT (Report)     DEVICE INTERROGATED IN THE [facility] OFFICE  BATTERY VOLTAGE ADEQUATE  (8 5 YRS)  AP 31%  3%  ALL LEAD PARAMETERS WITHIN NORMAL LIMITS  10 VHR EPISODES DETECTED LONGEST 5 BEATS @ 398ms  18 AHR EPISODES LONGEST 3 HOURS  PATIENT IS ON ASA ONLY DUE TO LIVER CIRROHSIS  HISTORY OF PAF  NO PROGRAMMING CHANGES MADE TO DEVICE PARAMETERS  NORMAL DEVICE FUNCTION  ---ORDOÑEZ   Cardiac Electrophysiology Report      [id number]  pdf   DEVICE TYPE Pacemaker       Cardiac Electrophysiology Report [id number] 02:10PM Sari Leyden     Test Name Result Flag Reference   Cardiac Electrophysiology Report      [id number]  pdf     Signatures   Electronically signed by : Elias Spears, ; Jul 21 2016 11:30AM EST                       (Author)    Electronically signed by : MYNOR Ny ; Jul 21 2016 12:07PM EST                       (Author)    "

## 2018-03-07 NOTE — PROGRESS NOTES
"  Discussion/Summary  Normal device function     AHR over 2 hours, average V rate 107bpm       Results/Data  Results   Cardiac Device Remote [id number] 07:15AM Amber Pro     Test Name Result Flag Reference   MISCELLANEOUS COMMENT (Report)     CARELINK TRANSMISSION: BATTERY VOLTAGE ADEQUATE (9 YRS); AP = 28%,  - 2%; (1) AHR (MARKERS ONLY) WITH DURATION >= 1 MIN ; HX OF PAF/AFL - ASA ONLY DUE TO LIVER CIRROHSIS; (1) DEVICE CLASSIFIED VHR (MARKERS ONLY) - DURATION @ 7 BEATS/AVG  MS; EF = 55% (5/2014) ; PT ALSO TAKES SOTALOL, PROPANOLOL; ALL LEAD PARAMETERS APPEAR WITHIN NORMAL LIMITS/STABLE; NO OTHER TESTING AVAILABLE DUE TO REMOTE TRANSMISSION; NORMAL DEVICE FUNCTION  eb   Cardiac Electrophysiology Report      [id number]}  pdf   DEVICE TYPE Pacemaker       Cardiac Electrophysiology Report [id number] 07:15AM Amber Pro     Test Name Result Flag Reference   Cardiac Electrophysiology Report      [id number]  pdf     Signatures   Electronically signed by : Lillie Workman, ; Mar  1 2016 11:21AM EST                       (Author)    Electronically signed by : MYNOR Daley ; Mar  1 2016  5:41PM EST                       (Author)    "

## 2018-03-07 NOTE — PROGRESS NOTES
"  Discussion/Summary  Normal device function      Results/Data  Cardiac Device Remote [id number] 05:02PM Chestine Sy     Test Name Result Flag Reference   MISCELLANEOUS COMMENT      CARELINK TRANSMISSION: BATTERY STATUS "OK"  AP 90%  0 3%  NO SIGNIFICANT HIGH RATE EPISODES  ALL AVAILABLE LEAD PARAMETERS WITHIN NORMAL LIMITS  NORMAL DEVICE FUNCTION  NC   Cardiac Electrophysiology Report      [id number]  pdf   DEVICE TYPE Pacemaker       Cardiac Electrophysiology Report [id number] 05:02PM Chestine Sy     Test Name Result Flag Reference   Cardiac Electrophysiology Report      [id number]  pdf     Signatures   Electronically signed by : Neda Jama, ; Nov 1 2016 10:15AM EST                       (Author)    Electronically signed by : MYNOR Randall ; Nov 1 2016  1:34PM EST                       (Author)    "

## 2018-03-07 NOTE — PROGRESS NOTES
"  Discussion/Summary  Normal device function      Results/Data  Cardiac Device In Clinic [id number] 01:12PM Berenice Rutledge     Test Name Result Flag Reference   MISCELLANEOUS COMMENT (Report)     DEVICE INTERROGATED IN THE [place] OFFICE: BATTERY VOLTAGE ADEQUATE  AP 62 9%  0 4%  ALL AVAILABLE LEAD PARAMETERS WITHIN NORMAL LIMITS  11 AHR NOTED, LONGEST 1 25 HRS  COUMADIN STOPPED 17 BY DR Frankey Cavalier PER FACILITY SHEET  PT ON ASA  28 VHRS NOTED, LONGEST 9 SECS  PT ON SOTALOL  AVAIL EGRAMS PRESENT AS SVT; NSVT CAN'T BE EXCLUDED-PAF NOTED  INCREASE MADE TO RV PW TO PROVIDE ADEQUATE SAFETY MARGIN  NORMAL DEVICE FUNCTION  NC   Cardiac Electrophysiology Report      [id number]____1  pdf   DEVICE TYPE Pacemaker       Cardiac Electrophysiology Report [id number] 01:12PM Berenice Rutledge     Test Name Result Flag Reference   Cardiac Electrophysiology Report      [id number]  pdf     Signatures   Electronically signed by : Von Cobb, ; Aug 14 2017  1:27PM EST                       (Author)    Electronically signed by : MYNOR Lee ; Aug 15 2017  8:47AM EST                       (Author)    "